# Patient Record
Sex: MALE | Race: BLACK OR AFRICAN AMERICAN | Employment: UNEMPLOYED | URBAN - METROPOLITAN AREA
[De-identification: names, ages, dates, MRNs, and addresses within clinical notes are randomized per-mention and may not be internally consistent; named-entity substitution may affect disease eponyms.]

---

## 2017-01-01 ENCOUNTER — OFFICE VISIT (OUTPATIENT)
Dept: NEUROLOGY | Age: 37
End: 2017-01-01

## 2017-01-01 ENCOUNTER — APPOINTMENT (OUTPATIENT)
Dept: CT IMAGING | Age: 37
DRG: 071 | End: 2017-01-01
Attending: NEUROLOGICAL SURGERY
Payer: MEDICARE

## 2017-01-01 ENCOUNTER — HOME CARE VISIT (OUTPATIENT)
Dept: HOME HEALTH SERVICES | Facility: HOME HEALTH | Age: 37
End: 2017-01-01

## 2017-01-01 ENCOUNTER — PATIENT OUTREACH (OUTPATIENT)
Dept: FAMILY MEDICINE CLINIC | Age: 37
End: 2017-01-01

## 2017-01-01 ENCOUNTER — APPOINTMENT (OUTPATIENT)
Dept: MRI IMAGING | Age: 37
DRG: 071 | End: 2017-01-01
Attending: NEUROLOGICAL SURGERY
Payer: MEDICARE

## 2017-01-01 ENCOUNTER — HOME CARE VISIT (OUTPATIENT)
Dept: SCHEDULING | Facility: HOME HEALTH | Age: 37
End: 2017-01-01
Payer: MEDICARE

## 2017-01-01 ENCOUNTER — APPOINTMENT (OUTPATIENT)
Dept: PHYSICAL THERAPY | Age: 37
End: 2017-01-01

## 2017-01-01 ENCOUNTER — APPOINTMENT (OUTPATIENT)
Dept: MRI IMAGING | Age: 37
DRG: 095 | End: 2017-01-01
Attending: EMERGENCY MEDICINE
Payer: MEDICARE

## 2017-01-01 ENCOUNTER — DOCUMENTATION ONLY (OUTPATIENT)
Dept: NEUROLOGY | Age: 37
End: 2017-01-01

## 2017-01-01 ENCOUNTER — HOSPITAL ENCOUNTER (INPATIENT)
Age: 37
LOS: 8 days | Discharge: NURSING, REHAB OR LONG-TERM CARE | DRG: 071 | End: 2017-06-23
Attending: EMERGENCY MEDICINE | Admitting: HOSPITALIST
Payer: MEDICARE

## 2017-01-01 ENCOUNTER — ANESTHESIA (OUTPATIENT)
Dept: SURGERY | Age: 37
DRG: 071 | End: 2017-01-01
Payer: MEDICARE

## 2017-01-01 ENCOUNTER — TELEPHONE (OUTPATIENT)
Dept: FAMILY MEDICINE CLINIC | Age: 37
End: 2017-01-01

## 2017-01-01 ENCOUNTER — TELEPHONE (OUTPATIENT)
Dept: NEUROLOGY | Age: 37
End: 2017-01-01

## 2017-01-01 ENCOUNTER — APPOINTMENT (OUTPATIENT)
Dept: GENERAL RADIOLOGY | Age: 37
DRG: 095 | End: 2017-01-01
Attending: EMERGENCY MEDICINE
Payer: MEDICARE

## 2017-01-01 ENCOUNTER — HOSPITAL ENCOUNTER (OUTPATIENT)
Dept: MRI IMAGING | Age: 37
Discharge: HOME | End: 2017-08-10
Attending: NEUROLOGICAL SURGERY
Payer: MEDICARE

## 2017-01-01 ENCOUNTER — HOSPITAL ENCOUNTER (OUTPATIENT)
Dept: CT IMAGING | Age: 37
Discharge: HOME | End: 2017-03-01
Attending: FAMILY MEDICINE
Payer: MEDICARE

## 2017-01-01 ENCOUNTER — APPOINTMENT (OUTPATIENT)
Dept: GENERAL RADIOLOGY | Age: 37
DRG: 071 | End: 2017-01-01
Attending: HOSPITALIST
Payer: MEDICARE

## 2017-01-01 ENCOUNTER — HOSPITAL ENCOUNTER (INPATIENT)
Age: 37
LOS: 7 days | Discharge: NURSING, REHAB OR LONG-TERM CARE | DRG: 095 | End: 2017-03-09
Attending: EMERGENCY MEDICINE | Admitting: FAMILY MEDICINE
Payer: MEDICARE

## 2017-01-01 ENCOUNTER — OFFICE VISIT (OUTPATIENT)
Dept: FAMILY MEDICINE CLINIC | Age: 37
End: 2017-01-01

## 2017-01-01 ENCOUNTER — HOME HEALTH ADMISSION (OUTPATIENT)
Dept: HOME HEALTH SERVICES | Facility: HOME HEALTH | Age: 37
End: 2017-01-01
Payer: MEDICARE

## 2017-01-01 ENCOUNTER — ANESTHESIA EVENT (OUTPATIENT)
Dept: SURGERY | Age: 37
DRG: 071 | End: 2017-01-01
Payer: MEDICARE

## 2017-01-01 ENCOUNTER — HOSPITAL ENCOUNTER (OUTPATIENT)
Dept: GENERAL RADIOLOGY | Age: 37
Discharge: HOME | End: 2017-03-02
Payer: MEDICARE

## 2017-01-01 ENCOUNTER — APPOINTMENT (OUTPATIENT)
Dept: MRI IMAGING | Age: 37
DRG: 095 | End: 2017-01-01
Attending: PSYCHIATRY & NEUROLOGY
Payer: MEDICARE

## 2017-01-01 ENCOUNTER — APPOINTMENT (OUTPATIENT)
Dept: GENERAL RADIOLOGY | Age: 37
DRG: 071 | End: 2017-01-01
Attending: EMERGENCY MEDICINE
Payer: MEDICARE

## 2017-01-01 ENCOUNTER — HOSPITAL ENCOUNTER (OUTPATIENT)
Dept: LAB | Age: 37
Discharge: HOME | DRG: 095 | End: 2017-03-02
Payer: MEDICARE

## 2017-01-01 ENCOUNTER — APPOINTMENT (OUTPATIENT)
Dept: CT IMAGING | Age: 37
DRG: 071 | End: 2017-01-01
Attending: EMERGENCY MEDICINE
Payer: MEDICARE

## 2017-01-01 ENCOUNTER — PATIENT OUTREACH (OUTPATIENT)
Dept: CASE MANAGEMENT | Age: 37
End: 2017-01-01

## 2017-01-01 ENCOUNTER — HOSPITAL ENCOUNTER (INPATIENT)
Age: 37
LOS: 10 days | Discharge: HOME HEALTH | End: 2017-03-19
Attending: INTERNAL MEDICINE | Admitting: INTERNAL MEDICINE

## 2017-01-01 ENCOUNTER — DOCUMENTATION ONLY (OUTPATIENT)
Dept: FAMILY MEDICINE CLINIC | Age: 37
End: 2017-01-01

## 2017-01-01 ENCOUNTER — APPOINTMENT (OUTPATIENT)
Dept: MRI IMAGING | Age: 37
DRG: 071 | End: 2017-01-01
Attending: EMERGENCY MEDICINE
Payer: MEDICARE

## 2017-01-01 VITALS
TEMPERATURE: 99.2 F | WEIGHT: 184.8 LBS | OXYGEN SATURATION: 98 % | BODY MASS INDEX: 23.72 KG/M2 | HEART RATE: 124 BPM | DIASTOLIC BLOOD PRESSURE: 62 MMHG | HEIGHT: 74 IN | SYSTOLIC BLOOD PRESSURE: 120 MMHG

## 2017-01-01 VITALS
DIASTOLIC BLOOD PRESSURE: 72 MMHG | OXYGEN SATURATION: 98 % | TEMPERATURE: 98.7 F | SYSTOLIC BLOOD PRESSURE: 130 MMHG | HEART RATE: 118 BPM

## 2017-01-01 VITALS
SYSTOLIC BLOOD PRESSURE: 103 MMHG | HEART RATE: 102 BPM | BODY MASS INDEX: 23.11 KG/M2 | WEIGHT: 174.4 LBS | TEMPERATURE: 98.5 F | DIASTOLIC BLOOD PRESSURE: 47 MMHG | OXYGEN SATURATION: 98 % | HEIGHT: 73 IN

## 2017-01-01 VITALS
OXYGEN SATURATION: 98 % | SYSTOLIC BLOOD PRESSURE: 108 MMHG | HEART RATE: 83 BPM | DIASTOLIC BLOOD PRESSURE: 66 MMHG | RESPIRATION RATE: 16 BRPM | HEIGHT: 74 IN | BODY MASS INDEX: 21.7 KG/M2 | WEIGHT: 169.09 LBS | TEMPERATURE: 98.1 F

## 2017-01-01 VITALS
BODY MASS INDEX: 22.77 KG/M2 | HEART RATE: 93 BPM | DIASTOLIC BLOOD PRESSURE: 63 MMHG | OXYGEN SATURATION: 97 % | SYSTOLIC BLOOD PRESSURE: 113 MMHG | HEIGHT: 74 IN | TEMPERATURE: 98 F | WEIGHT: 177.4 LBS | RESPIRATION RATE: 18 BRPM

## 2017-01-01 VITALS
DIASTOLIC BLOOD PRESSURE: 74 MMHG | HEART RATE: 105 BPM | OXYGEN SATURATION: 98 % | SYSTOLIC BLOOD PRESSURE: 124 MMHG | TEMPERATURE: 98.8 F

## 2017-01-01 VITALS — WEIGHT: 175 LBS | BODY MASS INDEX: 22.47 KG/M2

## 2017-01-01 VITALS
WEIGHT: 178.4 LBS | BODY MASS INDEX: 23.64 KG/M2 | DIASTOLIC BLOOD PRESSURE: 76 MMHG | TEMPERATURE: 98.6 F | OXYGEN SATURATION: 99 % | SYSTOLIC BLOOD PRESSURE: 110 MMHG | HEART RATE: 79 BPM | RESPIRATION RATE: 14 BRPM | HEIGHT: 73 IN

## 2017-01-01 VITALS
RESPIRATION RATE: 18 BRPM | TEMPERATURE: 98.6 F | HEIGHT: 74 IN | DIASTOLIC BLOOD PRESSURE: 79 MMHG | SYSTOLIC BLOOD PRESSURE: 132 MMHG | OXYGEN SATURATION: 98 % | HEART RATE: 110 BPM | BODY MASS INDEX: 23.18 KG/M2 | WEIGHT: 180.6 LBS

## 2017-01-01 VITALS
SYSTOLIC BLOOD PRESSURE: 120 MMHG | TEMPERATURE: 98.4 F | OXYGEN SATURATION: 99 % | DIASTOLIC BLOOD PRESSURE: 76 MMHG | HEART RATE: 103 BPM

## 2017-01-01 VITALS — DIASTOLIC BLOOD PRESSURE: 64 MMHG | OXYGEN SATURATION: 97 % | HEART RATE: 104 BPM | SYSTOLIC BLOOD PRESSURE: 112 MMHG

## 2017-01-01 VITALS
WEIGHT: 181.2 LBS | RESPIRATION RATE: 18 BRPM | BODY MASS INDEX: 23.25 KG/M2 | HEART RATE: 108 BPM | TEMPERATURE: 98.4 F | SYSTOLIC BLOOD PRESSURE: 108 MMHG | DIASTOLIC BLOOD PRESSURE: 67 MMHG | OXYGEN SATURATION: 94 % | HEIGHT: 74 IN

## 2017-01-01 VITALS — SYSTOLIC BLOOD PRESSURE: 110 MMHG | OXYGEN SATURATION: 96 % | HEART RATE: 94 BPM | DIASTOLIC BLOOD PRESSURE: 60 MMHG

## 2017-01-01 VITALS
TEMPERATURE: 99.3 F | DIASTOLIC BLOOD PRESSURE: 77 MMHG | HEART RATE: 116 BPM | OXYGEN SATURATION: 97 % | SYSTOLIC BLOOD PRESSURE: 113 MMHG | HEIGHT: 73 IN | BODY MASS INDEX: 23.19 KG/M2 | WEIGHT: 175 LBS | RESPIRATION RATE: 18 BRPM

## 2017-01-01 VITALS — DIASTOLIC BLOOD PRESSURE: 60 MMHG | SYSTOLIC BLOOD PRESSURE: 104 MMHG | HEART RATE: 100 BPM | OXYGEN SATURATION: 96 %

## 2017-01-01 VITALS
WEIGHT: 180 LBS | DIASTOLIC BLOOD PRESSURE: 68 MMHG | SYSTOLIC BLOOD PRESSURE: 120 MMHG | BODY MASS INDEX: 23.86 KG/M2 | OXYGEN SATURATION: 98 % | HEIGHT: 73 IN | HEART RATE: 62 BPM | TEMPERATURE: 98.5 F

## 2017-01-01 VITALS
OXYGEN SATURATION: 97 % | TEMPERATURE: 98 F | DIASTOLIC BLOOD PRESSURE: 82 MMHG | RESPIRATION RATE: 14 BRPM | HEIGHT: 74 IN | BODY MASS INDEX: 22.35 KG/M2 | WEIGHT: 174.16 LBS | HEART RATE: 78 BPM | SYSTOLIC BLOOD PRESSURE: 126 MMHG

## 2017-01-01 VITALS — OXYGEN SATURATION: 99 % | HEART RATE: 87 BPM | DIASTOLIC BLOOD PRESSURE: 60 MMHG | SYSTOLIC BLOOD PRESSURE: 108 MMHG

## 2017-01-01 VITALS
SYSTOLIC BLOOD PRESSURE: 134 MMHG | BODY MASS INDEX: 22.84 KG/M2 | WEIGHT: 178 LBS | HEIGHT: 74 IN | OXYGEN SATURATION: 98 % | HEART RATE: 112 BPM | DIASTOLIC BLOOD PRESSURE: 62 MMHG | RESPIRATION RATE: 14 BRPM | TEMPERATURE: 99.2 F

## 2017-01-01 VITALS
BODY MASS INDEX: 23.44 KG/M2 | RESPIRATION RATE: 14 BRPM | WEIGHT: 182.6 LBS | TEMPERATURE: 98.7 F | HEART RATE: 112 BPM | SYSTOLIC BLOOD PRESSURE: 102 MMHG | DIASTOLIC BLOOD PRESSURE: 68 MMHG | HEIGHT: 74 IN | OXYGEN SATURATION: 98 %

## 2017-01-01 VITALS
OXYGEN SATURATION: 100 % | HEIGHT: 73 IN | RESPIRATION RATE: 18 BRPM | DIASTOLIC BLOOD PRESSURE: 73 MMHG | HEART RATE: 107 BPM | TEMPERATURE: 100.4 F | BODY MASS INDEX: 23.19 KG/M2 | WEIGHT: 175 LBS | SYSTOLIC BLOOD PRESSURE: 116 MMHG

## 2017-01-01 VITALS
DIASTOLIC BLOOD PRESSURE: 78 MMHG | HEART RATE: 69 BPM | TEMPERATURE: 98.1 F | SYSTOLIC BLOOD PRESSURE: 118 MMHG | OXYGEN SATURATION: 93 %

## 2017-01-01 VITALS
RESPIRATION RATE: 14 BRPM | SYSTOLIC BLOOD PRESSURE: 108 MMHG | OXYGEN SATURATION: 96 % | BODY MASS INDEX: 22.97 KG/M2 | HEIGHT: 74 IN | WEIGHT: 179 LBS | HEART RATE: 103 BPM | TEMPERATURE: 98.6 F | DIASTOLIC BLOOD PRESSURE: 64 MMHG

## 2017-01-01 VITALS
TEMPERATURE: 97.9 F | DIASTOLIC BLOOD PRESSURE: 76 MMHG | OXYGEN SATURATION: 95 % | SYSTOLIC BLOOD PRESSURE: 122 MMHG | HEART RATE: 99 BPM

## 2017-01-01 VITALS — SYSTOLIC BLOOD PRESSURE: 100 MMHG | DIASTOLIC BLOOD PRESSURE: 66 MMHG

## 2017-01-01 VITALS — DIASTOLIC BLOOD PRESSURE: 56 MMHG | OXYGEN SATURATION: 98 % | HEART RATE: 99 BPM | SYSTOLIC BLOOD PRESSURE: 110 MMHG

## 2017-01-01 VITALS — OXYGEN SATURATION: 98 % | HEART RATE: 107 BPM | SYSTOLIC BLOOD PRESSURE: 128 MMHG | DIASTOLIC BLOOD PRESSURE: 59 MMHG

## 2017-01-01 DIAGNOSIS — D86.89 NEUROSARCOIDOSIS: ICD-10-CM

## 2017-01-01 DIAGNOSIS — H53.2 DIPLOPIA: ICD-10-CM

## 2017-01-01 DIAGNOSIS — D86.89 NEUROSARCOIDOSIS IN ADULT: ICD-10-CM

## 2017-01-01 DIAGNOSIS — D86.89 NEUROSARCOIDOSIS: Primary | ICD-10-CM

## 2017-01-01 DIAGNOSIS — M24.542 CONTRACTURE, LEFT HAND: ICD-10-CM

## 2017-01-01 DIAGNOSIS — R50.9 FEVER, UNSPECIFIED FEVER CAUSE: Primary | ICD-10-CM

## 2017-01-01 DIAGNOSIS — G81.94 LEFT HEMIPARESIS (HCC): Primary | ICD-10-CM

## 2017-01-01 DIAGNOSIS — G81.94 LEFT HEMIPARESIS (HCC): ICD-10-CM

## 2017-01-01 DIAGNOSIS — G81.10 SPASTIC HEMIPLEGIA (HCC): Primary | ICD-10-CM

## 2017-01-01 DIAGNOSIS — Z79.899 HIGH RISK MEDICATION USE: ICD-10-CM

## 2017-01-01 DIAGNOSIS — M25.572 ACUTE LEFT ANKLE PAIN: ICD-10-CM

## 2017-01-01 DIAGNOSIS — M62.838 MUSCLE SPASTICITY: ICD-10-CM

## 2017-01-01 DIAGNOSIS — G93.89 BRAIN MASS: ICD-10-CM

## 2017-01-01 DIAGNOSIS — H53.2 DIPLOPIA: Primary | ICD-10-CM

## 2017-01-01 DIAGNOSIS — K59.01 SLOW TRANSIT CONSTIPATION: ICD-10-CM

## 2017-01-01 DIAGNOSIS — R51.9 INTRACTABLE HEADACHE, UNSPECIFIED CHRONICITY PATTERN, UNSPECIFIED HEADACHE TYPE: ICD-10-CM

## 2017-01-01 DIAGNOSIS — H53.2 DOUBLE VISION: ICD-10-CM

## 2017-01-01 DIAGNOSIS — B37.0 THRUSH: Primary | ICD-10-CM

## 2017-01-01 DIAGNOSIS — G03.9 MENINGITIS: Primary | ICD-10-CM

## 2017-01-01 DIAGNOSIS — R32 ENURESIS: ICD-10-CM

## 2017-01-01 DIAGNOSIS — R05.9 COUGH: ICD-10-CM

## 2017-01-01 LAB
ACID FAST STN SPEC: NEGATIVE
ACID FAST STN SPEC: NORMAL
ALB CSF/SERPL: 6 {RATIO} (ref 0–8)
ALBUMIN CSF-MCNC: 20 MG/DL (ref 11–48)
ALBUMIN SERPL BCP-MCNC: 2.3 G/DL (ref 3.4–5)
ALBUMIN SERPL BCP-MCNC: 2.3 G/DL (ref 3.4–5)
ALBUMIN SERPL BCP-MCNC: 2.4 G/DL (ref 3.4–5)
ALBUMIN SERPL BCP-MCNC: 2.6 G/DL (ref 3.4–5)
ALBUMIN SERPL BCP-MCNC: 2.7 G/DL (ref 3.4–5)
ALBUMIN SERPL BCP-MCNC: 2.7 G/DL (ref 3.4–5)
ALBUMIN SERPL BCP-MCNC: 3 G/DL (ref 3.4–5)
ALBUMIN SERPL BCP-MCNC: 3.3 G/DL (ref 3.4–5)
ALBUMIN SERPL-MCNC: 3.3 G/DL (ref 3.5–5.5)
ALBUMIN/GLOB SERPL: 0.6 {RATIO} (ref 0.8–1.7)
ALBUMIN/GLOB SERPL: 0.6 {RATIO} (ref 0.8–1.7)
ALBUMIN/GLOB SERPL: 0.7 {RATIO} (ref 0.8–1.7)
ALBUMIN/GLOB SERPL: 0.7 {RATIO} (ref 0.8–1.7)
ALBUMIN/GLOB SERPL: 0.8 {RATIO} (ref 0.8–1.7)
ALP SERPL-CCNC: 64 U/L (ref 45–117)
ALP SERPL-CCNC: 66 U/L (ref 45–117)
ALP SERPL-CCNC: 66 U/L (ref 45–117)
ALP SERPL-CCNC: 76 U/L (ref 45–117)
ALP SERPL-CCNC: 78 U/L (ref 45–117)
ALP SERPL-CCNC: 85 U/L (ref 45–117)
ALP SERPL-CCNC: 88 U/L (ref 45–117)
ALP SERPL-CCNC: 92 U/L (ref 45–117)
ALT SERPL-CCNC: 13 U/L (ref 16–61)
ALT SERPL-CCNC: 15 U/L (ref 16–61)
ALT SERPL-CCNC: 18 U/L (ref 16–61)
ALT SERPL-CCNC: 20 U/L (ref 16–61)
ALT SERPL-CCNC: 22 U/L (ref 16–61)
ALT SERPL-CCNC: 23 U/L (ref 16–61)
ALT SERPL-CCNC: 28 U/L (ref 16–61)
ALT SERPL-CCNC: 31 U/L (ref 16–61)
ANA SER QL: NEGATIVE
ANGIO CONVERTING ENZ, CSF: 1 U/L (ref 0–2.5)
ANION GAP BLD CALC-SCNC: 10 MMOL/L (ref 3–18)
ANION GAP BLD CALC-SCNC: 11 MMOL/L (ref 3–18)
ANION GAP BLD CALC-SCNC: 5 MMOL/L (ref 3–18)
ANION GAP BLD CALC-SCNC: 7 MMOL/L (ref 3–18)
ANION GAP BLD CALC-SCNC: 9 MMOL/L (ref 3–18)
APPEARANCE UR: ABNORMAL
APPEARANCE UR: CLEAR
AST SERPL W P-5'-P-CCNC: 11 U/L (ref 15–37)
AST SERPL W P-5'-P-CCNC: 16 U/L (ref 15–37)
AST SERPL W P-5'-P-CCNC: 20 U/L (ref 15–37)
AST SERPL W P-5'-P-CCNC: 21 U/L (ref 15–37)
AST SERPL W P-5'-P-CCNC: 21 U/L (ref 15–37)
AST SERPL W P-5'-P-CCNC: 25 U/L (ref 15–37)
AST SERPL W P-5'-P-CCNC: 7 U/L (ref 15–37)
AST SERPL W P-5'-P-CCNC: 8 U/L (ref 15–37)
B BURGDOR IGG PATRN CSF IB-IMP: NEGATIVE
B BURGDOR IGM PATRN CSF IB-IMP: NEGATIVE
B BURGDOR18KD IGG CSF QL IB: ABNORMAL
B BURGDOR23KD IGG CSF QL IB: ABNORMAL
B BURGDOR23KD IGM CSF QL IB: ABNORMAL
B BURGDOR28KD IGG CSF QL IB: ABNORMAL
B BURGDOR30KD IGG CSF QL IB: ABNORMAL
B BURGDOR39KD IGG CSF QL IB: ABNORMAL
B BURGDOR39KD IGM CSF QL IB: ABNORMAL
B BURGDOR41KD IGG CSF QL IB: ABNORMAL
B BURGDOR41KD IGM CSF QL IB: ABNORMAL
B BURGDOR45KD IGG CSF QL IB: ABNORMAL
B BURGDOR58KD IGG CSF QL IB: PRESENT
B BURGDOR66KD IGG CSF QL IB: PRESENT
B BURGDOR93KD IGG CSF QL IB: ABNORMAL
BACTERIA SPEC CULT: NORMAL
BACTERIA URNS QL MICRO: NEGATIVE /HPF
BACTERIA URNS QL MICRO: NEGATIVE /HPF
BASOPHILS # BLD AUTO: 0 K/UL (ref 0–0.06)
BASOPHILS # BLD AUTO: 0 X10E3/UL (ref 0–0.2)
BASOPHILS # BLD: 0 % (ref 0–2)
BASOPHILS NFR BLD AUTO: 0 %
BDY SITE: NORMAL
BILIRUB SERPL-MCNC: 0.1 MG/DL (ref 0.2–1)
BILIRUB SERPL-MCNC: 0.1 MG/DL (ref 0.2–1)
BILIRUB SERPL-MCNC: 0.2 MG/DL (ref 0.2–1)
BILIRUB SERPL-MCNC: 0.6 MG/DL (ref 0.2–1)
BILIRUB SERPL-MCNC: 0.6 MG/DL (ref 0.2–1)
BILIRUB SERPL-MCNC: 0.9 MG/DL (ref 0.2–1)
BILIRUB UR QL STRIP: NORMAL
BILIRUB UR QL: NEGATIVE
BILIRUB UR QL: NEGATIVE
BUN SERPL-MCNC: 10 MG/DL (ref 7–18)
BUN SERPL-MCNC: 10 MG/DL (ref 7–18)
BUN SERPL-MCNC: 11 MG/DL (ref 7–18)
BUN SERPL-MCNC: 12 MG/DL (ref 7–18)
BUN SERPL-MCNC: 16 MG/DL (ref 7–18)
BUN SERPL-MCNC: 17 MG/DL (ref 7–18)
BUN SERPL-MCNC: 8 MG/DL (ref 7–18)
BUN SERPL-MCNC: 9 MG/DL (ref 7–18)
BUN/CREAT SERPL: 10 (ref 12–20)
BUN/CREAT SERPL: 11 (ref 12–20)
BUN/CREAT SERPL: 14 (ref 12–20)
BUN/CREAT SERPL: 14 (ref 12–20)
BUN/CREAT SERPL: 18 (ref 12–20)
BUN/CREAT SERPL: 19 (ref 12–20)
BUN/CREAT SERPL: 24 (ref 12–20)
BUN/CREAT SERPL: 27 (ref 12–20)
BUN/CREAT SERPL: 9 (ref 12–20)
CALCIUM SERPL-MCNC: 7.2 MG/DL (ref 8.5–10.1)
CALCIUM SERPL-MCNC: 7.5 MG/DL (ref 8.5–10.1)
CALCIUM SERPL-MCNC: 7.6 MG/DL (ref 8.5–10.1)
CALCIUM SERPL-MCNC: 7.7 MG/DL (ref 8.5–10.1)
CALCIUM SERPL-MCNC: 7.9 MG/DL (ref 8.5–10.1)
CALCIUM SERPL-MCNC: 7.9 MG/DL (ref 8.5–10.1)
CALCIUM SERPL-MCNC: 8.1 MG/DL (ref 8.5–10.1)
CALCIUM SERPL-MCNC: 8.5 MG/DL (ref 8.5–10.1)
CALCIUM SERPL-MCNC: 8.6 MG/DL (ref 8.5–10.1)
CHARACTER SMN: ABNORMAL
CHARACTER SMN: CLEAR
CHLORIDE SERPL-SCNC: 103 MMOL/L (ref 100–108)
CHLORIDE SERPL-SCNC: 106 MMOL/L (ref 100–108)
CHLORIDE SERPL-SCNC: 107 MMOL/L (ref 100–108)
CHLORIDE SERPL-SCNC: 107 MMOL/L (ref 100–108)
CHLORIDE SERPL-SCNC: 109 MMOL/L (ref 100–108)
CHLORIDE SERPL-SCNC: 110 MMOL/L (ref 100–108)
CHLORIDE SERPL-SCNC: 110 MMOL/L (ref 100–108)
CHLORIDE SERPL-SCNC: 115 MMOL/L (ref 100–108)
CHLORIDE SERPL-SCNC: 98 MMOL/L (ref 100–108)
CK MB CFR SERPL CALC: 0.1 % (ref 0–4)
CK MB SERPL-MCNC: 1.3 NG/ML (ref 5–25)
CK SERPL-CCNC: 896 U/L (ref 39–308)
CO2 SERPL-SCNC: 22 MMOL/L (ref 21–32)
CO2 SERPL-SCNC: 23 MMOL/L (ref 21–32)
CO2 SERPL-SCNC: 24 MMOL/L (ref 21–32)
CO2 SERPL-SCNC: 26 MMOL/L (ref 21–32)
CO2 SERPL-SCNC: 27 MMOL/L (ref 21–32)
CO2 SERPL-SCNC: 29 MMOL/L (ref 21–32)
COLOR SPUN CSF: COLORLESS
COLOR SPUN CSF: COLORLESS
COLOR UR: YELLOW
COLOR UR: YELLOW
CREAT SERPL-MCNC: 0.59 MG/DL (ref 0.6–1.3)
CREAT SERPL-MCNC: 0.64 MG/DL (ref 0.6–1.3)
CREAT SERPL-MCNC: 0.65 MG/DL (ref 0.6–1.3)
CREAT SERPL-MCNC: 0.65 MG/DL (ref 0.6–1.3)
CREAT SERPL-MCNC: 0.67 MG/DL (ref 0.6–1.3)
CREAT SERPL-MCNC: 0.7 MG/DL (ref 0.6–1.3)
CREAT SERPL-MCNC: 0.72 MG/DL (ref 0.6–1.3)
CREAT SERPL-MCNC: 0.88 MG/DL (ref 0.6–1.3)
CREAT SERPL-MCNC: 1 MG/DL (ref 0.6–1.3)
CRYPTOC AG CSF QL LA: NEGATIVE
DATE LAST DOSE: NORMAL
DIFFERENTIAL METHOD BLD: ABNORMAL
EOSINOPHIL # BLD AUTO: 0 X10E3/UL (ref 0–0.4)
EOSINOPHIL # BLD: 0 K/UL (ref 0–0.4)
EOSINOPHIL # BLD: 0.1 K/UL (ref 0–0.4)
EOSINOPHIL NFR BLD AUTO: 0 %
EOSINOPHIL NFR BLD: 0 % (ref 0–5)
EOSINOPHIL NFR BLD: 1 % (ref 0–5)
EPITH CASTS URNS QL MICRO: ABNORMAL /LPF (ref 0–5)
EPITH CASTS URNS QL MICRO: NEGATIVE /LPF (ref 0–5)
ERYTHROCYTE [DISTWIDTH] IN BLOOD BY AUTOMATED COUNT: 15.5 % (ref 11.6–14.5)
ERYTHROCYTE [DISTWIDTH] IN BLOOD BY AUTOMATED COUNT: 15.5 % (ref 11.6–14.5)
ERYTHROCYTE [DISTWIDTH] IN BLOOD BY AUTOMATED COUNT: 15.6 % (ref 11.6–14.5)
ERYTHROCYTE [DISTWIDTH] IN BLOOD BY AUTOMATED COUNT: 15.6 % (ref 11.6–14.5)
ERYTHROCYTE [DISTWIDTH] IN BLOOD BY AUTOMATED COUNT: 15.8 % (ref 11.6–14.5)
ERYTHROCYTE [DISTWIDTH] IN BLOOD BY AUTOMATED COUNT: 17 % (ref 11.6–14.5)
ERYTHROCYTE [DISTWIDTH] IN BLOOD BY AUTOMATED COUNT: 17 % (ref 11.6–14.5)
ERYTHROCYTE [DISTWIDTH] IN BLOOD BY AUTOMATED COUNT: 17.1 % (ref 11.6–14.5)
ERYTHROCYTE [DISTWIDTH] IN BLOOD BY AUTOMATED COUNT: 17.2 % (ref 11.6–14.5)
ERYTHROCYTE [DISTWIDTH] IN BLOOD BY AUTOMATED COUNT: 17.5 % (ref 11.6–14.5)
ERYTHROCYTE [DISTWIDTH] IN BLOOD BY AUTOMATED COUNT: 17.5 % (ref 12.3–15.4)
ERYTHROCYTE [SEDIMENTATION RATE] IN BLOOD: 80 MM/HR (ref 0–15)
EST. AVERAGE GLUCOSE BLD GHB EST-MCNC: 97 MG/DL
FLUAV AG NPH QL IA: NEGATIVE
FLUBV AG NOSE QL IA: NEGATIVE
FOLATE SERPL-MCNC: 16.1 NG/ML (ref 3.1–17.5)
FOLATE SERPL-MCNC: 17.8 NG/ML (ref 3.1–17.5)
FUNGUS SMEAR,FNGSMR: NORMAL
GLOBULIN SER CALC-MCNC: 2.9 G/DL (ref 2–4)
GLOBULIN SER CALC-MCNC: 3 G/DL (ref 2–4)
GLOBULIN SER CALC-MCNC: 3.2 G/DL (ref 2–4)
GLOBULIN SER CALC-MCNC: 3.6 G/DL (ref 2–4)
GLOBULIN SER CALC-MCNC: 3.7 G/DL (ref 2–4)
GLOBULIN SER CALC-MCNC: 4 G/DL (ref 2–4)
GLOBULIN SER CALC-MCNC: 4.2 G/DL (ref 2–4)
GLOBULIN SER CALC-MCNC: 4.8 G/DL (ref 2–4)
GLUCOSE BLD STRIP.AUTO-MCNC: 97 MG/DL (ref 70–110)
GLUCOSE BLD STRIP.AUTO-MCNC: 98 MG/DL (ref 70–110)
GLUCOSE CSF-MCNC: 46 MG/DL (ref 40–70)
GLUCOSE CSF-MCNC: 53 MG/DL (ref 40–70)
GLUCOSE SERPL-MCNC: 101 MG/DL (ref 74–99)
GLUCOSE SERPL-MCNC: 105 MG/DL (ref 74–99)
GLUCOSE SERPL-MCNC: 109 MG/DL (ref 74–99)
GLUCOSE SERPL-MCNC: 115 MG/DL (ref 74–99)
GLUCOSE SERPL-MCNC: 78 MG/DL (ref 74–99)
GLUCOSE SERPL-MCNC: 87 MG/DL (ref 74–99)
GLUCOSE SERPL-MCNC: 87 MG/DL (ref 74–99)
GLUCOSE SERPL-MCNC: 90 MG/DL (ref 74–99)
GLUCOSE SERPL-MCNC: 91 MG/DL (ref 74–99)
GLUCOSE SERPL-MCNC: 98 MG/DL (ref 74–99)
GLUCOSE SERPL-MCNC: 99 MG/DL (ref 74–99)
GLUCOSE UR STRIP.AUTO-MCNC: NEGATIVE MG/DL
GLUCOSE UR STRIP.AUTO-MCNC: NEGATIVE MG/DL
GLUCOSE UR-MCNC: NEGATIVE MG/DL
GP B STREP AG FLD QL: NEGATIVE
GRAM STN SPEC: NORMAL
HAEM INFLU B AG FLD QL: NEGATIVE
HBA1C MFR BLD: 5 % (ref 4.2–5.6)
HCT VFR BLD AUTO: 31.3 % (ref 36–48)
HCT VFR BLD AUTO: 31.9 % (ref 36–48)
HCT VFR BLD AUTO: 32.3 % (ref 36–48)
HCT VFR BLD AUTO: 33 % (ref 36–48)
HCT VFR BLD AUTO: 33.2 % (ref 36–48)
HCT VFR BLD AUTO: 33.7 % (ref 36–48)
HCT VFR BLD AUTO: 33.7 % (ref 37.5–51)
HCT VFR BLD AUTO: 34 % (ref 36–48)
HCT VFR BLD AUTO: 34.6 % (ref 36–48)
HCT VFR BLD AUTO: 34.6 % (ref 36–48)
HCT VFR BLD AUTO: 36 % (ref 36–48)
HGB BLD-MCNC: 10.2 G/DL (ref 13–16)
HGB BLD-MCNC: 10.2 G/DL (ref 13–16)
HGB BLD-MCNC: 10.5 G/DL (ref 13–16)
HGB BLD-MCNC: 10.5 G/DL (ref 13–16)
HGB BLD-MCNC: 10.6 G/DL (ref 12.6–17.7)
HGB BLD-MCNC: 10.7 G/DL (ref 13–16)
HGB BLD-MCNC: 10.9 G/DL (ref 13–16)
HGB BLD-MCNC: 10.9 G/DL (ref 13–16)
HGB BLD-MCNC: 11.1 G/DL (ref 13–16)
HGB BLD-MCNC: 11.1 G/DL (ref 13–16)
HGB BLD-MCNC: 11.5 G/DL (ref 13–16)
HGB UR QL STRIP: ABNORMAL
HGB UR QL STRIP: ABNORMAL
HIV 1 & 2 AB SER-IMP: NONREACTIVE
HIV12 RESULT COMMENT, HHIVC: NORMAL
HSV1 DNA SPEC QL NAA+PROBE: NEGATIVE
HSV2 DNA SPEC QL NAA+PROBE: NEGATIVE
IGG CSF-MCNC: 3.7 MG/DL (ref 0–8.6)
IGG SERPL-MCNC: 1020 MG/DL (ref 700–1600)
IGG SYNTH RATE SER+CSF CALC-MRATE: 0.9 MG/DAY
IGG/ALB CLEAR SER+CSF-RTO: 0.6 (ref 0–0.7)
IGG/ALB CSF: 0.19 {RATIO} (ref 0–0.25)
IMM GRANULOCYTES # BLD: 0 X10E3/UL (ref 0–0.1)
IMM GRANULOCYTES NFR BLD: 0 %
INR PPP: 1.1 (ref 0.8–1.2)
INR PPP: 1.2 (ref 0.8–1.2)
INR PPP: 1.4 (ref 0.8–1.2)
INR PPP: 1.5 (ref 0.8–1.2)
INTERPRETATION:, 510752: NORMAL
KETONES P FAST UR STRIP-MCNC: NORMAL MG/DL
KETONES UR QL STRIP.AUTO: NEGATIVE MG/DL
KETONES UR QL STRIP.AUTO: NEGATIVE MG/DL
LACTATE BLD-SCNC: 0.5 MMOL/L (ref 0.4–2)
LEUKOCYTE ESTERASE UR QL STRIP.AUTO: NEGATIVE
LEUKOCYTE ESTERASE UR QL STRIP.AUTO: NEGATIVE
LYMPHOCYTES # BLD AUTO: 1.5 X10E3/UL (ref 0.7–3.1)
LYMPHOCYTES # BLD AUTO: 10 % (ref 21–52)
LYMPHOCYTES # BLD AUTO: 10 % (ref 21–52)
LYMPHOCYTES # BLD AUTO: 11 % (ref 21–52)
LYMPHOCYTES # BLD AUTO: 17 % (ref 21–52)
LYMPHOCYTES # BLD AUTO: 5 % (ref 21–52)
LYMPHOCYTES # BLD AUTO: 7 % (ref 21–52)
LYMPHOCYTES # BLD AUTO: 8 % (ref 21–52)
LYMPHOCYTES # BLD AUTO: 8 % (ref 21–52)
LYMPHOCYTES # BLD AUTO: 9 % (ref 21–52)
LYMPHOCYTES # BLD: 0.5 K/UL (ref 0.9–3.6)
LYMPHOCYTES # BLD: 0.8 K/UL (ref 0.9–3.6)
LYMPHOCYTES # BLD: 0.9 K/UL (ref 0.9–3.6)
LYMPHOCYTES # BLD: 1 K/UL (ref 0.9–3.6)
LYMPHOCYTES # BLD: 1.1 K/UL (ref 0.9–3.6)
LYMPHOCYTES # BLD: 1.2 K/UL (ref 0.9–3.6)
LYMPHOCYTES # BLD: 2.1 K/UL (ref 0.9–3.6)
LYMPHOCYTES NFR BLD AUTO: 11 %
LYMPHOCYTES NFR CSF MANUAL: 5 %
MAGNESIUM SERPL-MCNC: 2 MG/DL (ref 1.6–2.6)
MBP CSF-MCNC: 6.4 NG/ML (ref 0–1.2)
MCH RBC QN AUTO: 26.2 PG (ref 26.6–33)
MCH RBC QN AUTO: 26.3 PG (ref 24–34)
MCH RBC QN AUTO: 26.4 PG (ref 24–34)
MCH RBC QN AUTO: 26.6 PG (ref 24–34)
MCH RBC QN AUTO: 26.7 PG (ref 24–34)
MCH RBC QN AUTO: 26.8 PG (ref 24–34)
MCH RBC QN AUTO: 27.1 PG (ref 24–34)
MCH RBC QN AUTO: 27.3 PG (ref 24–34)
MCH RBC QN AUTO: 27.4 PG (ref 24–34)
MCH RBC QN AUTO: 27.6 PG (ref 24–34)
MCH RBC QN AUTO: 27.7 PG (ref 24–34)
MCHC RBC AUTO-ENTMCNC: 31.5 G/DL (ref 31.5–35.7)
MCHC RBC AUTO-ENTMCNC: 31.8 G/DL (ref 31–37)
MCHC RBC AUTO-ENTMCNC: 31.9 G/DL (ref 31–37)
MCHC RBC AUTO-ENTMCNC: 32 G/DL (ref 31–37)
MCHC RBC AUTO-ENTMCNC: 32.1 G/DL (ref 31–37)
MCHC RBC AUTO-ENTMCNC: 32.2 G/DL (ref 31–37)
MCHC RBC AUTO-ENTMCNC: 32.3 G/DL (ref 31–37)
MCHC RBC AUTO-ENTMCNC: 32.5 G/DL (ref 31–37)
MCHC RBC AUTO-ENTMCNC: 32.6 G/DL (ref 31–37)
MCV RBC AUTO: 82.1 FL (ref 74–97)
MCV RBC AUTO: 82.4 FL (ref 74–97)
MCV RBC AUTO: 82.6 FL (ref 74–97)
MCV RBC AUTO: 82.6 FL (ref 74–97)
MCV RBC AUTO: 83 FL (ref 79–97)
MCV RBC AUTO: 83.6 FL (ref 74–97)
MCV RBC AUTO: 84.8 FL (ref 74–97)
MCV RBC AUTO: 85 FL (ref 74–97)
MCV RBC AUTO: 85.1 FL (ref 74–97)
MCV RBC AUTO: 85.3 FL (ref 74–97)
MCV RBC AUTO: 85.8 FL (ref 74–97)
MONOCYTES # BLD AUTO: 1.3 X10E3/UL (ref 0.1–0.9)
MONOCYTES # BLD: 0.1 K/UL (ref 0.05–1.2)
MONOCYTES # BLD: 0.4 K/UL (ref 0.05–1.2)
MONOCYTES # BLD: 0.4 K/UL (ref 0.05–1.2)
MONOCYTES # BLD: 0.5 K/UL (ref 0.05–1.2)
MONOCYTES # BLD: 0.6 K/UL (ref 0.05–1.2)
MONOCYTES # BLD: 0.7 K/UL (ref 0.05–1.2)
MONOCYTES # BLD: 0.8 K/UL (ref 0.05–1.2)
MONOCYTES # BLD: 1 K/UL (ref 0.05–1.2)
MONOCYTES # BLD: 1.1 K/UL (ref 0.05–1.2)
MONOCYTES NFR BLD AUTO: 1 % (ref 3–10)
MONOCYTES NFR BLD AUTO: 3 % (ref 3–10)
MONOCYTES NFR BLD AUTO: 4 % (ref 3–10)
MONOCYTES NFR BLD AUTO: 5 % (ref 3–10)
MONOCYTES NFR BLD AUTO: 6 % (ref 3–10)
MONOCYTES NFR BLD AUTO: 7 % (ref 3–10)
MONOCYTES NFR BLD AUTO: 7 % (ref 3–10)
MONOCYTES NFR BLD AUTO: 8 % (ref 3–10)
MONOCYTES NFR BLD AUTO: 9 %
MONOCYTES NFR BLD AUTO: 9 % (ref 3–10)
MONOCYTES NFR CSF MANUAL: 12 %
MUCOUS THREADS URNS QL MICRO: ABNORMAL /LPF
MYCOBACTERIUM SPEC QL CULT: NEGATIVE
MYCOBACTERIUM SPEC QL CULT: NORMAL
N MEN AG SPEC QL: NEGATIVE
N MEN SG A+W135 AG FLD QL: NEGATIVE
N MEN SG B+E COLI K1 AG SPEC QL LA: NEGATIVE
NEUTROPHILS # BLD AUTO: 11.5 X10E3/UL (ref 1.4–7)
NEUTROPHILS NFR BLD AUTO: 80 %
NEUTS SEG # BLD: 10.3 K/UL (ref 1.8–8)
NEUTS SEG # BLD: 11.3 K/UL (ref 1.8–8)
NEUTS SEG # BLD: 8.3 K/UL (ref 1.8–8)
NEUTS SEG # BLD: 8.5 K/UL (ref 1.8–8)
NEUTS SEG # BLD: 8.7 K/UL (ref 1.8–8)
NEUTS SEG # BLD: 8.9 K/UL (ref 1.8–8)
NEUTS SEG # BLD: 9.2 K/UL (ref 1.8–8)
NEUTS SEG # BLD: 9.4 K/UL (ref 1.8–8)
NEUTS SEG # BLD: 9.8 K/UL (ref 1.8–8)
NEUTS SEG NFR BLD AUTO: 73 % (ref 40–73)
NEUTS SEG NFR BLD AUTO: 82 % (ref 40–73)
NEUTS SEG NFR BLD AUTO: 84 % (ref 40–73)
NEUTS SEG NFR BLD AUTO: 84 % (ref 40–73)
NEUTS SEG NFR BLD AUTO: 85 % (ref 40–73)
NEUTS SEG NFR BLD AUTO: 85 % (ref 40–73)
NEUTS SEG NFR BLD AUTO: 87 % (ref 40–73)
NEUTS SEG NFR BLD AUTO: 90 % (ref 40–73)
NEUTS SEG NFR BLD AUTO: 94 % (ref 40–73)
NEUTS SEG NFR CSF MANUAL: 83 % (ref 0–6)
NITRITE UR QL STRIP.AUTO: NEGATIVE
NITRITE UR QL STRIP.AUTO: NEGATIVE
OLIGOCLONAL BANDS.IT SER+CSF QL: ABNORMAL
OLIGOCLONAL BANDS.IT SER+CSF QL: NORMAL
OLIGOCLONAL BANDS.IT SER+CSF QL: NORMAL
PH UR STRIP: 5.5 [PH] (ref 4.6–8)
PH UR STRIP: 6.5 [PH] (ref 5–8)
PH UR STRIP: 6.5 [PH] (ref 5–8)
PLATELET # BLD AUTO: 239 K/UL (ref 135–420)
PLATELET # BLD AUTO: 249 K/UL (ref 135–420)
PLATELET # BLD AUTO: 251 K/UL (ref 135–420)
PLATELET # BLD AUTO: 254 K/UL (ref 135–420)
PLATELET # BLD AUTO: 261 K/UL (ref 135–420)
PLATELET # BLD AUTO: 277 X10E3/UL (ref 150–379)
PLATELET # BLD AUTO: 296 K/UL (ref 135–420)
PLATELET # BLD AUTO: 329 K/UL (ref 135–420)
PLATELET # BLD AUTO: 340 K/UL (ref 135–420)
PLATELET # BLD AUTO: 343 K/UL (ref 135–420)
PLATELET # BLD AUTO: 372 K/UL (ref 135–420)
PMV BLD AUTO: 10.1 FL (ref 9.2–11.8)
PMV BLD AUTO: 10.1 FL (ref 9.2–11.8)
PMV BLD AUTO: 10.3 FL (ref 9.2–11.8)
PMV BLD AUTO: 10.3 FL (ref 9.2–11.8)
PMV BLD AUTO: 10.7 FL (ref 9.2–11.8)
PMV BLD AUTO: 10.7 FL (ref 9.2–11.8)
PMV BLD AUTO: 11 FL (ref 9.2–11.8)
PMV BLD AUTO: 8.9 FL (ref 9.2–11.8)
PMV BLD AUTO: 9.7 FL (ref 9.2–11.8)
PMV BLD AUTO: 9.7 FL (ref 9.2–11.8)
POTASSIUM SERPL-SCNC: 3.5 MMOL/L (ref 3.5–5.5)
POTASSIUM SERPL-SCNC: 3.7 MMOL/L (ref 3.5–5.5)
POTASSIUM SERPL-SCNC: 3.8 MMOL/L (ref 3.5–5.5)
POTASSIUM SERPL-SCNC: 4.1 MMOL/L (ref 3.5–5.5)
POTASSIUM SERPL-SCNC: 4.2 MMOL/L (ref 3.5–5.5)
POTASSIUM SERPL-SCNC: 4.3 MMOL/L (ref 3.5–5.5)
POTASSIUM SERPL-SCNC: 4.3 MMOL/L (ref 3.5–5.5)
POTASSIUM SERPL-SCNC: 4.5 MMOL/L (ref 3.5–5.5)
POTASSIUM SERPL-SCNC: 4.5 MMOL/L (ref 3.5–5.5)
PROT CSF-MCNC: 103 MG/DL (ref 15–45)
PROT CSF-MCNC: 46 MG/DL (ref 15–45)
PROT SERPL-MCNC: 5.3 G/DL (ref 6.4–8.2)
PROT SERPL-MCNC: 5.3 G/DL (ref 6.4–8.2)
PROT SERPL-MCNC: 5.5 G/DL (ref 6.4–8.2)
PROT SERPL-MCNC: 6.3 G/DL (ref 6.4–8.2)
PROT SERPL-MCNC: 6.3 G/DL (ref 6.4–8.2)
PROT SERPL-MCNC: 6.9 G/DL (ref 6.4–8.2)
PROT SERPL-MCNC: 7.3 G/DL (ref 6.4–8.2)
PROT SERPL-MCNC: 7.8 G/DL (ref 6.4–8.2)
PROT UR QL STRIP: NORMAL MG/DL
PROT UR STRIP-MCNC: NEGATIVE MG/DL
PROT UR STRIP-MCNC: NEGATIVE MG/DL
PROTHROMBIN TIME: 13.7 SEC (ref 11.5–15.2)
PROTHROMBIN TIME: 14.3 SEC (ref 11.5–15.2)
PROTHROMBIN TIME: 14.8 SEC (ref 11.5–15.2)
PROTHROMBIN TIME: 14.8 SEC (ref 11.5–15.2)
PROTHROMBIN TIME: 15 SEC (ref 11.5–15.2)
PROTHROMBIN TIME: 15.2 SEC (ref 11.5–15.2)
PROTHROMBIN TIME: 15.2 SEC (ref 11.5–15.2)
PROTHROMBIN TIME: 16.7 SEC (ref 11.5–15.2)
PROTHROMBIN TIME: 17.1 SEC (ref 11.5–15.2)
QUICKVUE INFLUENZA TEST: NEGATIVE
RBC # BLD AUTO: 3.68 M/UL (ref 4.7–5.5)
RBC # BLD AUTO: 3.72 M/UL (ref 4.7–5.5)
RBC # BLD AUTO: 3.81 M/UL (ref 4.7–5.5)
RBC # BLD AUTO: 3.87 M/UL (ref 4.7–5.5)
RBC # BLD AUTO: 4.02 M/UL (ref 4.7–5.5)
RBC # BLD AUTO: 4.05 X10E6/UL (ref 4.14–5.8)
RBC # BLD AUTO: 4.07 M/UL (ref 4.7–5.5)
RBC # BLD AUTO: 4.09 M/UL (ref 4.7–5.5)
RBC # BLD AUTO: 4.14 M/UL (ref 4.7–5.5)
RBC # BLD AUTO: 4.14 M/UL (ref 4.7–5.5)
RBC # BLD AUTO: 4.36 M/UL (ref 4.7–5.5)
RBC # CSF: 2 /CU MM
RBC # CSF: 8 /CU MM
RBC #/AREA URNS HPF: ABNORMAL /HPF (ref 0–5)
RBC #/AREA URNS HPF: NORMAL /HPF (ref 0–5)
REAGIN AB CSF QL: NON REACTIVE
REAGIN AB CSF QL: NON REACTIVE
REF LAB TEST METHOD: NORMAL
REPORTED DOSE,DOSE: NORMAL UNITS
REPORTED DOSE/TIME,TMG: NORMAL
RPR SER QL: NONREACTIVE
RPR SER QL: NONREACTIVE
S PNEUM AG SPEC QL: NEGATIVE
SEE BELOW:, 164879: NORMAL
SERVICE CMNT-IMP: NORMAL
SODIUM SERPL-SCNC: 136 MMOL/L (ref 136–145)
SODIUM SERPL-SCNC: 137 MMOL/L (ref 136–145)
SODIUM SERPL-SCNC: 138 MMOL/L (ref 136–145)
SODIUM SERPL-SCNC: 140 MMOL/L (ref 136–145)
SODIUM SERPL-SCNC: 141 MMOL/L (ref 136–145)
SODIUM SERPL-SCNC: 141 MMOL/L (ref 136–145)
SODIUM SERPL-SCNC: 142 MMOL/L (ref 136–145)
SODIUM SERPL-SCNC: 143 MMOL/L (ref 136–145)
SODIUM SERPL-SCNC: 143 MMOL/L (ref 136–145)
SODIUM SERPL-SCNC: 145 MMOL/L (ref 136–145)
SODIUM SERPL-SCNC: 148 MMOL/L (ref 136–145)
SP GR UR REFRACTOMETRY: 1.01 (ref 1–1.03)
SP GR UR REFRACTOMETRY: <1.005 (ref 1–1.03)
SP GR UR STRIP: 1.03 (ref 1–1.03)
SPECIMEN PREPARATION: NORMAL
SPECIMEN PREPARATION: NORMAL
SPECIMEN SOURCE: NORMAL
T4 FREE SERPL-MCNC: 0.8 NG/DL (ref 0.7–1.5)
TPMT RBC-CCNC: 22.3 UNITS/ML RBC
TROPONIN I SERPL-MCNC: <0.02 NG/ML (ref 0–0.04)
TSH SERPL DL<=0.05 MIU/L-ACNC: 0.28 UIU/ML (ref 0.36–3.74)
TSH SERPL DL<=0.05 MIU/L-ACNC: 0.33 UIU/ML (ref 0.36–3.74)
TUBE # CSF: 1
TUBE # CSF: 3
TUBE # CSF: 3
UA UROBILINOGEN AMB POC: NORMAL (ref 0.2–1)
URINALYSIS CLARITY POC: CLEAR
URINALYSIS COLOR POC: YELLOW
URINE BLOOD POC: NORMAL
URINE LEUKOCYTES POC: NEGATIVE
URINE NITRITES POC: NEGATIVE
UROBILINOGEN UR QL STRIP.AUTO: 0.2 EU/DL (ref 0.2–1)
UROBILINOGEN UR QL STRIP.AUTO: 0.2 EU/DL (ref 0.2–1)
VALID INTERNAL CONTROL?: YES
VANCOMYCIN TROUGH SERPL-MCNC: 17.7 UG/ML (ref 10–20)
VIRUS SPEC CULT: NORMAL
VIT B12 SERPL-MCNC: 229 PG/ML (ref 211–911)
VIT B12 SERPL-MCNC: 232 PG/ML (ref 211–911)
WBC # BLD AUTO: 10 K/UL (ref 4.6–13.2)
WBC # BLD AUTO: 10.3 K/UL (ref 4.6–13.2)
WBC # BLD AUTO: 10.8 K/UL (ref 4.6–13.2)
WBC # BLD AUTO: 11.5 K/UL (ref 4.6–13.2)
WBC # BLD AUTO: 12.4 K/UL (ref 4.6–13.2)
WBC # BLD AUTO: 12.5 K/UL (ref 4.6–13.2)
WBC # BLD AUTO: 12.6 K/UL (ref 4.6–13.2)
WBC # BLD AUTO: 12.9 K/UL (ref 4.6–13.2)
WBC # BLD AUTO: 14.4 X10E3/UL (ref 3.4–10.8)
WBC # BLD AUTO: 9.6 K/UL (ref 4.6–13.2)
WBC # BLD AUTO: 9.9 K/UL (ref 4.6–13.2)
WBC # CSF: 0 /CU MM
WBC # CSF: 678 /CU MM
WBC URNS QL MICRO: ABNORMAL /HPF (ref 0–4)
WBC URNS QL MICRO: NEGATIVE /HPF (ref 0–4)

## 2017-01-01 PROCEDURE — 3331090001 HH PPS REVENUE CREDIT

## 2017-01-01 PROCEDURE — 86403 PARTICLE AGGLUT ANTBDY SCRN: CPT | Performed by: EMERGENCY MEDICINE

## 2017-01-01 PROCEDURE — 85610 PROTHROMBIN TIME: CPT | Performed by: HOSPITALIST

## 2017-01-01 PROCEDURE — 85025 COMPLETE CBC W/AUTO DIFF WBC: CPT | Performed by: PSYCHIATRY & NEUROLOGY

## 2017-01-01 PROCEDURE — 77030020263 HC SOL INJ SOD CL0.9% LFCR 1000ML

## 2017-01-01 PROCEDURE — 51798 US URINE CAPACITY MEASURE: CPT

## 2017-01-01 PROCEDURE — 36415 COLL VENOUS BLD VENIPUNCTURE: CPT | Performed by: HOSPITALIST

## 2017-01-01 PROCEDURE — 74011250636 HC RX REV CODE- 250/636: Performed by: NEUROLOGICAL SURGERY

## 2017-01-01 PROCEDURE — 70553 MRI BRAIN STEM W/O & W/DYE: CPT

## 2017-01-01 PROCEDURE — 96361 HYDRATE IV INFUSION ADD-ON: CPT

## 2017-01-01 PROCEDURE — 74011000258 HC RX REV CODE- 258: Performed by: EMERGENCY MEDICINE

## 2017-01-01 PROCEDURE — 74011250636 HC RX REV CODE- 250/636: Performed by: PSYCHIATRY & NEUROLOGY

## 2017-01-01 PROCEDURE — 80053 COMPREHEN METABOLIC PANEL: CPT | Performed by: HOSPITALIST

## 2017-01-01 PROCEDURE — 77030033263 HC DRSG MEPILEX 16-48IN BORD MOLN -B

## 2017-01-01 PROCEDURE — 74011000258 HC RX REV CODE- 258: Performed by: INTERNAL MEDICINE

## 2017-01-01 PROCEDURE — A9585 GADOBUTROL INJECTION: HCPCS | Performed by: HOSPITALIST

## 2017-01-01 PROCEDURE — 74011250637 HC RX REV CODE- 250/637: Performed by: INTERNAL MEDICINE

## 2017-01-01 PROCEDURE — 74011250636 HC RX REV CODE- 250/636: Performed by: FAMILY MEDICINE

## 2017-01-01 PROCEDURE — 3331090002 HH PPS REVENUE DEBIT

## 2017-01-01 PROCEDURE — 05JY3ZZ INSPECTION OF UPPER VEIN, PERCUTANEOUS APPROACH: ICD-10-PCS | Performed by: NEUROLOGICAL SURGERY

## 2017-01-01 PROCEDURE — 75810000133 HC LUMBAR PUNCTURE

## 2017-01-01 PROCEDURE — 89050 BODY FLUID CELL COUNT: CPT | Performed by: HOSPITALIST

## 2017-01-01 PROCEDURE — 74011250636 HC RX REV CODE- 250/636: Performed by: HOSPITALIST

## 2017-01-01 PROCEDURE — C1729 CATH, DRAINAGE: HCPCS | Performed by: NEUROLOGICAL SURGERY

## 2017-01-01 PROCEDURE — 009U3ZX DRAINAGE OF SPINAL CANAL, PERCUTANEOUS APPROACH, DIAGNOSTIC: ICD-10-PCS | Performed by: INTERNAL MEDICINE

## 2017-01-01 PROCEDURE — 86617 LYME DISEASE ANTIBODY: CPT | Performed by: EMERGENCY MEDICINE

## 2017-01-01 PROCEDURE — 74011000255 HC RX REV CODE- 255: Performed by: HOSPITALIST

## 2017-01-01 PROCEDURE — 400013 HH SOC

## 2017-01-01 PROCEDURE — 70450 CT HEAD/BRAIN W/O DYE: CPT

## 2017-01-01 PROCEDURE — G0157 HHC PT ASSISTANT EA 15: HCPCS

## 2017-01-01 PROCEDURE — A9585 GADOBUTROL INJECTION: HCPCS | Performed by: EMERGENCY MEDICINE

## 2017-01-01 PROCEDURE — 74011636637 HC RX REV CODE- 636/637: Performed by: EMERGENCY MEDICINE

## 2017-01-01 PROCEDURE — 77030029372 HC ADH SKN CLSR PRINEO J&J -C: Performed by: NEUROLOGICAL SURGERY

## 2017-01-01 PROCEDURE — 77030003666 HC NDL SPINAL BD -A

## 2017-01-01 PROCEDURE — 77030027138 HC INCENT SPIROMETER -A

## 2017-01-01 PROCEDURE — 85610 PROTHROMBIN TIME: CPT | Performed by: NEUROLOGICAL SURGERY

## 2017-01-01 PROCEDURE — 74011250636 HC RX REV CODE- 250/636

## 2017-01-01 PROCEDURE — 74011250636 HC RX REV CODE- 250/636: Performed by: EMERGENCY MEDICINE

## 2017-01-01 PROCEDURE — 82962 GLUCOSE BLOOD TEST: CPT

## 2017-01-01 PROCEDURE — 87327 CRYPTOCOCCUS NEOFORM AG IA: CPT | Performed by: EMERGENCY MEDICINE

## 2017-01-01 PROCEDURE — 36415 COLL VENOUS BLD VENIPUNCTURE: CPT | Performed by: PSYCHIATRY & NEUROLOGY

## 2017-01-01 PROCEDURE — 77010033678 HC OXYGEN DAILY

## 2017-01-01 PROCEDURE — 88108 CYTOPATH CONCENTRATE TECH: CPT | Performed by: HOSPITALIST

## 2017-01-01 PROCEDURE — 74011250636 HC RX REV CODE- 250/636: Performed by: INTERNAL MEDICINE

## 2017-01-01 PROCEDURE — 84157 ASSAY OF PROTEIN OTHER: CPT | Performed by: HOSPITALIST

## 2017-01-01 PROCEDURE — 94760 N-INVAS EAR/PLS OXIMETRY 1: CPT

## 2017-01-01 PROCEDURE — 74011250637 HC RX REV CODE- 250/637: Performed by: HOSPITALIST

## 2017-01-01 PROCEDURE — 77030010545

## 2017-01-01 PROCEDURE — 77030003666 HC NDL SPINAL BD -A: Performed by: NEUROLOGICAL SURGERY

## 2017-01-01 PROCEDURE — 65270000029 HC RM PRIVATE

## 2017-01-01 PROCEDURE — 87116 MYCOBACTERIA CULTURE: CPT | Performed by: INTERNAL MEDICINE

## 2017-01-01 PROCEDURE — C9113 INJ PANTOPRAZOLE SODIUM, VIA: HCPCS | Performed by: FAMILY MEDICINE

## 2017-01-01 PROCEDURE — 84157 ASSAY OF PROTEIN OTHER: CPT | Performed by: EMERGENCY MEDICINE

## 2017-01-01 PROCEDURE — 87102 FUNGUS ISOLATION CULTURE: CPT | Performed by: HOSPITALIST

## 2017-01-01 PROCEDURE — 77030014647 HC SEAL FBRN TISSL BAXT -D: Performed by: NEUROLOGICAL SURGERY

## 2017-01-01 PROCEDURE — 82746 ASSAY OF FOLIC ACID SERUM: CPT | Performed by: EMERGENCY MEDICINE

## 2017-01-01 PROCEDURE — 81001 URINALYSIS AUTO W/SCOPE: CPT | Performed by: EMERGENCY MEDICINE

## 2017-01-01 PROCEDURE — C1751 CATH, INF, PER/CENT/MIDLINE: HCPCS

## 2017-01-01 PROCEDURE — 65610000006 HC RM INTENSIVE CARE

## 2017-01-01 PROCEDURE — 74011250636 HC RX REV CODE- 250/636: Performed by: ANESTHESIOLOGY

## 2017-01-01 PROCEDURE — 85025 COMPLETE CBC W/AUTO DIFF WBC: CPT | Performed by: NEUROLOGICAL SURGERY

## 2017-01-01 PROCEDURE — 86592 SYPHILIS TEST NON-TREP QUAL: CPT | Performed by: EMERGENCY MEDICINE

## 2017-01-01 PROCEDURE — 76010000171 HC OR TIME 2 TO 2.5 HR INTENSV-TIER 1: Performed by: NEUROLOGICAL SURGERY

## 2017-01-01 PROCEDURE — G0151 HHCP-SERV OF PT,EA 15 MIN: HCPCS

## 2017-01-01 PROCEDURE — 65660000000 HC RM CCU STEPDOWN

## 2017-01-01 PROCEDURE — 74230 X-RAY XM SWLNG FUNCJ C+: CPT

## 2017-01-01 PROCEDURE — 80053 COMPREHEN METABOLIC PANEL: CPT | Performed by: EMERGENCY MEDICINE

## 2017-01-01 PROCEDURE — 74011000250 HC RX REV CODE- 250: Performed by: FAMILY MEDICINE

## 2017-01-01 PROCEDURE — 97535 SELF CARE MNGMENT TRAINING: CPT

## 2017-01-01 PROCEDURE — 77030034849

## 2017-01-01 PROCEDURE — G0152 HHCP-SERV OF OT,EA 15 MIN: HCPCS

## 2017-01-01 PROCEDURE — 87070 CULTURE OTHR SPECIMN AEROBIC: CPT | Performed by: HOSPITALIST

## 2017-01-01 PROCEDURE — 86592 SYPHILIS TEST NON-TREP QUAL: CPT | Performed by: HOSPITALIST

## 2017-01-01 PROCEDURE — 74011636637 HC RX REV CODE- 636/637: Performed by: FAMILY MEDICINE

## 2017-01-01 PROCEDURE — 73610 X-RAY EXAM OF ANKLE: CPT

## 2017-01-01 PROCEDURE — 74011250637 HC RX REV CODE- 250/637: Performed by: FAMILY MEDICINE

## 2017-01-01 PROCEDURE — 77030011943

## 2017-01-01 PROCEDURE — 85025 COMPLETE CBC W/AUTO DIFF WBC: CPT | Performed by: EMERGENCY MEDICINE

## 2017-01-01 PROCEDURE — 96365 THER/PROPH/DIAG IV INF INIT: CPT

## 2017-01-01 PROCEDURE — 88331 PATH CONSLTJ SURG 1 BLK 1SPC: CPT | Performed by: NEUROLOGICAL SURGERY

## 2017-01-01 PROCEDURE — 36415 COLL VENOUS BLD VENIPUNCTURE: CPT | Performed by: NEUROLOGICAL SURGERY

## 2017-01-01 PROCEDURE — 77030011256 HC DRSG MEPILEX <16IN NO BORD MOLN -A

## 2017-01-01 PROCEDURE — 76937 US GUIDE VASCULAR ACCESS: CPT | Performed by: INTERNAL MEDICINE

## 2017-01-01 PROCEDURE — 77030034850

## 2017-01-01 PROCEDURE — 86703 HIV-1/HIV-2 1 RESULT ANTBDY: CPT | Performed by: FAMILY MEDICINE

## 2017-01-01 PROCEDURE — 02HV33Z INSERTION OF INFUSION DEVICE INTO SUPERIOR VENA CAVA, PERCUTANEOUS APPROACH: ICD-10-PCS | Performed by: INTERNAL MEDICINE

## 2017-01-01 PROCEDURE — 74011250637 HC RX REV CODE- 250/637: Performed by: EMERGENCY MEDICINE

## 2017-01-01 PROCEDURE — A9585 GADOBUTROL INJECTION: HCPCS | Performed by: INTERNAL MEDICINE

## 2017-01-01 PROCEDURE — 70546 MR ANGIOGRAPH HEAD W/O&W/DYE: CPT

## 2017-01-01 PROCEDURE — C1894 INTRO/SHEATH, NON-LASER: HCPCS

## 2017-01-01 PROCEDURE — 8E09XBG COMPUTER ASSISTED PROCEDURE OF HEAD AND NECK REGION, WITH COMPUTERIZED TOMOGRAPHY: ICD-10-PCS | Performed by: NEUROLOGICAL SURGERY

## 2017-01-01 PROCEDURE — 74011000250 HC RX REV CODE- 250

## 2017-01-01 PROCEDURE — 87102 FUNGUS ISOLATION CULTURE: CPT | Performed by: NEUROLOGICAL SURGERY

## 2017-01-01 PROCEDURE — 87804 INFLUENZA ASSAY W/OPTIC: CPT | Performed by: EMERGENCY MEDICINE

## 2017-01-01 PROCEDURE — 77030018719 HC DRSG PTCH ANTIMIC J&J -A

## 2017-01-01 PROCEDURE — 76210000016 HC OR PH I REC 1 TO 1.5 HR: Performed by: NEUROLOGICAL SURGERY

## 2017-01-01 PROCEDURE — 77030018846 HC SOL IRR STRL H20 ICUM -A: Performed by: NEUROLOGICAL SURGERY

## 2017-01-01 PROCEDURE — 84443 ASSAY THYROID STIM HORMONE: CPT | Performed by: EMERGENCY MEDICINE

## 2017-01-01 PROCEDURE — 77030020847 HC STATLOK BARD -A

## 2017-01-01 PROCEDURE — 77030030163 HC BN WAX J&J -A: Performed by: NEUROLOGICAL SURGERY

## 2017-01-01 PROCEDURE — 36415 COLL VENOUS BLD VENIPUNCTURE: CPT | Performed by: INTERNAL MEDICINE

## 2017-01-01 PROCEDURE — 77030014143 HC TY PUNC LUMBR BD -A

## 2017-01-01 PROCEDURE — 009U3ZX DRAINAGE OF SPINAL CANAL, PERCUTANEOUS APPROACH, DIAGNOSTIC: ICD-10-PCS | Performed by: RADIOLOGY

## 2017-01-01 PROCEDURE — 87070 CULTURE OTHR SPECIMN AEROBIC: CPT | Performed by: EMERGENCY MEDICINE

## 2017-01-01 PROCEDURE — 84439 ASSAY OF FREE THYROXINE: CPT | Performed by: EMERGENCY MEDICINE

## 2017-01-01 PROCEDURE — 82550 ASSAY OF CK (CPK): CPT | Performed by: EMERGENCY MEDICINE

## 2017-01-01 PROCEDURE — 85025 COMPLETE CBC W/AUTO DIFF WBC: CPT | Performed by: HOSPITALIST

## 2017-01-01 PROCEDURE — 84443 ASSAY THYROID STIM HORMONE: CPT | Performed by: HOSPITALIST

## 2017-01-01 PROCEDURE — 96375 TX/PRO/DX INJ NEW DRUG ADDON: CPT

## 2017-01-01 PROCEDURE — 36569 INSJ PICC 5 YR+ W/O IMAGING: CPT | Performed by: INTERNAL MEDICINE

## 2017-01-01 PROCEDURE — 74011250636 HC RX REV CODE- 250/636: Performed by: NURSE PRACTITIONER

## 2017-01-01 PROCEDURE — 88307 TISSUE EXAM BY PATHOLOGIST: CPT | Performed by: NEUROLOGICAL SURGERY

## 2017-01-01 PROCEDURE — 99285 EMERGENCY DEPT VISIT HI MDM: CPT

## 2017-01-01 PROCEDURE — 77030032490 HC SLV COMPR SCD KNE COVD -B

## 2017-01-01 PROCEDURE — 85652 RBC SED RATE AUTOMATED: CPT | Performed by: EMERGENCY MEDICINE

## 2017-01-01 PROCEDURE — A9585 GADOBUTROL INJECTION: HCPCS | Performed by: NEUROLOGICAL SURGERY

## 2017-01-01 PROCEDURE — 72080 X-RAY EXAM THORACOLMB 2/> VW: CPT

## 2017-01-01 PROCEDURE — 76060000035 HC ANESTHESIA 2 TO 2.5 HR: Performed by: NEUROLOGICAL SURGERY

## 2017-01-01 PROCEDURE — 82945 GLUCOSE OTHER FLUID: CPT | Performed by: EMERGENCY MEDICINE

## 2017-01-01 PROCEDURE — 82607 VITAMIN B-12: CPT | Performed by: EMERGENCY MEDICINE

## 2017-01-01 PROCEDURE — 80048 BASIC METABOLIC PNL TOTAL CA: CPT | Performed by: INTERNAL MEDICINE

## 2017-01-01 PROCEDURE — 97530 THERAPEUTIC ACTIVITIES: CPT

## 2017-01-01 PROCEDURE — 86038 ANTINUCLEAR ANTIBODIES: CPT | Performed by: HOSPITALIST

## 2017-01-01 PROCEDURE — 82945 GLUCOSE OTHER FLUID: CPT | Performed by: HOSPITALIST

## 2017-01-01 PROCEDURE — 77030018786 HC NDL GD F/USND BARD -B

## 2017-01-01 PROCEDURE — 83916 OLIGOCLONAL BANDS: CPT | Performed by: HOSPITALIST

## 2017-01-01 PROCEDURE — 77030010547 HC BG URIN W/UMETER -A

## 2017-01-01 PROCEDURE — 83036 HEMOGLOBIN GLYCOSYLATED A1C: CPT | Performed by: EMERGENCY MEDICINE

## 2017-01-01 PROCEDURE — 73564 X-RAY EXAM KNEE 4 OR MORE: CPT

## 2017-01-01 PROCEDURE — 97162 PT EVAL MOD COMPLEX 30 MIN: CPT

## 2017-01-01 PROCEDURE — 77030018836 HC SOL IRR NACL ICUM -A: Performed by: NEUROLOGICAL SURGERY

## 2017-01-01 PROCEDURE — 77030019551 HC KT TRAJ BIOP GD MEDT -G: Performed by: NEUROLOGICAL SURGERY

## 2017-01-01 PROCEDURE — 77003 FLUOROGUIDE FOR SPINE INJECT: CPT

## 2017-01-01 PROCEDURE — 77030013473 HC CRD BPLR AESC -A: Performed by: NEUROLOGICAL SURGERY

## 2017-01-01 PROCEDURE — 92526 ORAL FUNCTION THERAPY: CPT

## 2017-01-01 PROCEDURE — 97166 OT EVAL MOD COMPLEX 45 MIN: CPT

## 2017-01-01 PROCEDURE — 97116 GAIT TRAINING THERAPY: CPT

## 2017-01-01 PROCEDURE — 99284 EMERGENCY DEPT VISIT MOD MDM: CPT

## 2017-01-01 PROCEDURE — 83916 OLIGOCLONAL BANDS: CPT | Performed by: EMERGENCY MEDICINE

## 2017-01-01 PROCEDURE — 77030032490 HC SLV COMPR SCD KNE COVD -B: Performed by: NEUROLOGICAL SURGERY

## 2017-01-01 PROCEDURE — 77030003669 XR SPINAL PUNC LUMB DX

## 2017-01-01 PROCEDURE — 89050 BODY FLUID CELL COUNT: CPT | Performed by: EMERGENCY MEDICINE

## 2017-01-01 PROCEDURE — 77030002946 HC SUT NRLN J&J -B: Performed by: NEUROLOGICAL SURGERY

## 2017-01-01 PROCEDURE — 77030014007 HC SPNG HEMSTAT J&J -B: Performed by: NEUROLOGICAL SURGERY

## 2017-01-01 PROCEDURE — 77030018390 HC SPNG HEMSTAT2 J&J -B: Performed by: NEUROLOGICAL SURGERY

## 2017-01-01 PROCEDURE — 86592 SYPHILIS TEST NON-TREP QUAL: CPT | Performed by: FAMILY MEDICINE

## 2017-01-01 PROCEDURE — 82542 COL CHROMOTOGRAPHY QUAL/QUAN: CPT | Performed by: PSYCHIATRY & NEUROLOGY

## 2017-01-01 PROCEDURE — 87116 MYCOBACTERIA CULTURE: CPT | Performed by: NEUROLOGICAL SURGERY

## 2017-01-01 PROCEDURE — 74011000250 HC RX REV CODE- 250: Performed by: EMERGENCY MEDICINE

## 2017-01-01 PROCEDURE — 87102 FUNGUS ISOLATION CULTURE: CPT | Performed by: EMERGENCY MEDICINE

## 2017-01-01 PROCEDURE — 83605 ASSAY OF LACTIC ACID: CPT

## 2017-01-01 PROCEDURE — 93306 TTE W/DOPPLER COMPLETE: CPT

## 2017-01-01 PROCEDURE — 99001 SPECIMEN HANDLING PT-LAB: CPT | Performed by: FAMILY MEDICINE

## 2017-01-01 PROCEDURE — 77030012602 HC SPNG PTTY NEUR J&J -B: Performed by: NEUROLOGICAL SURGERY

## 2017-01-01 PROCEDURE — 82164 ANGIOTENSIN I ENZYME TEST: CPT | Performed by: HOSPITALIST

## 2017-01-01 PROCEDURE — 87252 VIRUS INOCULATION TISSUE: CPT | Performed by: HOSPITALIST

## 2017-01-01 PROCEDURE — 71010 XR CHEST PORT: CPT

## 2017-01-01 PROCEDURE — 80053 COMPREHEN METABOLIC PANEL: CPT | Performed by: PSYCHIATRY & NEUROLOGY

## 2017-01-01 PROCEDURE — 82042 OTHER SOURCE ALBUMIN QUAN EA: CPT | Performed by: HOSPITALIST

## 2017-01-01 PROCEDURE — 82607 VITAMIN B-12: CPT | Performed by: FAMILY MEDICINE

## 2017-01-01 PROCEDURE — 74011000250 HC RX REV CODE- 250: Performed by: NEUROLOGICAL SURGERY

## 2017-01-01 PROCEDURE — 77030013079 HC BLNKT BAIR HGGR 3M -A: Performed by: NURSE ANESTHETIST, CERTIFIED REGISTERED

## 2017-01-01 PROCEDURE — 77030030722 HC PIN SKULL MAYFLD INLC -B: Performed by: NEUROLOGICAL SURGERY

## 2017-01-01 PROCEDURE — 87040 BLOOD CULTURE FOR BACTERIA: CPT | Performed by: EMERGENCY MEDICINE

## 2017-01-01 PROCEDURE — 74011000258 HC RX REV CODE- 258

## 2017-01-01 PROCEDURE — 74011000250 HC RX REV CODE- 250: Performed by: INTERNAL MEDICINE

## 2017-01-01 PROCEDURE — 96360 HYDRATION IV INFUSION INIT: CPT

## 2017-01-01 PROCEDURE — 74011000250 HC RX REV CODE- 250: Performed by: HOSPITALIST

## 2017-01-01 PROCEDURE — 87529 HSV DNA AMP PROBE: CPT | Performed by: INTERNAL MEDICINE

## 2017-01-01 PROCEDURE — 80048 BASIC METABOLIC PNL TOTAL CA: CPT | Performed by: NEUROLOGICAL SURGERY

## 2017-01-01 PROCEDURE — 85027 COMPLETE CBC AUTOMATED: CPT | Performed by: INTERNAL MEDICINE

## 2017-01-01 PROCEDURE — 00B93ZX EXCISION OF THALAMUS, PERCUTANEOUS APPROACH, DIAGNOSTIC: ICD-10-PCS | Performed by: NEUROLOGICAL SURGERY

## 2017-01-01 PROCEDURE — 74011000272 HC RX REV CODE- 272: Performed by: NEUROLOGICAL SURGERY

## 2017-01-01 PROCEDURE — 87086 URINE CULTURE/COLONY COUNT: CPT | Performed by: PSYCHIATRY & NEUROLOGY

## 2017-01-01 PROCEDURE — 77030020245 HC SOL INJ 5% D/0.9%NACL

## 2017-01-01 PROCEDURE — 77030003028 HC SUT VCRL J&J -A: Performed by: NEUROLOGICAL SURGERY

## 2017-01-01 PROCEDURE — 81001 URINALYSIS AUTO W/SCOPE: CPT | Performed by: PSYCHIATRY & NEUROLOGY

## 2017-01-01 PROCEDURE — 83735 ASSAY OF MAGNESIUM: CPT | Performed by: EMERGENCY MEDICINE

## 2017-01-01 PROCEDURE — 80202 ASSAY OF VANCOMYCIN: CPT | Performed by: INTERNAL MEDICINE

## 2017-01-01 PROCEDURE — 77030019552 HC NDL BIOP PASS MEDT -F: Performed by: NEUROLOGICAL SURGERY

## 2017-01-01 PROCEDURE — 77030028270 HC SRGFL HEMSTAT MTRX J&J -C: Performed by: NEUROLOGICAL SURGERY

## 2017-01-01 PROCEDURE — 92611 MOTION FLUOROSCOPY/SWALLOW: CPT

## 2017-01-01 PROCEDURE — 83873 ASSAY OF CSF PROTEIN: CPT | Performed by: EMERGENCY MEDICINE

## 2017-01-01 PROCEDURE — 77030008683 HC TU ET CUF COVD -A: Performed by: NURSE ANESTHETIST, CERTIFIED REGISTERED

## 2017-01-01 PROCEDURE — L4396 STATIC OR DYNAMI AFO PRE CST: HCPCS

## 2017-01-01 PROCEDURE — 80048 BASIC METABOLIC PNL TOTAL CA: CPT | Performed by: HOSPITALIST

## 2017-01-01 PROCEDURE — 92610 EVALUATE SWALLOWING FUNCTION: CPT

## 2017-01-01 RX ORDER — ADHESIVE BANDAGE
30 BANDAGE TOPICAL DAILY PRN
Status: DISCONTINUED | OUTPATIENT
Start: 2017-01-01 | End: 2017-01-01 | Stop reason: HOSPADM

## 2017-01-01 RX ORDER — ACETAMINOPHEN AND CODEINE PHOSPHATE 300; 30 MG/1; MG/1
1 TABLET ORAL
Qty: 20 TAB | Refills: 0 | Status: SHIPPED | OUTPATIENT
Start: 2017-01-01 | End: 2017-01-01

## 2017-01-01 RX ORDER — POLYETHYLENE GLYCOL 3350 17 G/17G
17 POWDER, FOR SOLUTION ORAL DAILY
Qty: 30 EACH | Refills: 1 | Status: SHIPPED | OUTPATIENT
Start: 2017-01-01

## 2017-01-01 RX ORDER — CEFAZOLIN SODIUM 2 G/50ML
2 SOLUTION INTRAVENOUS ONCE
Status: COMPLETED | OUTPATIENT
Start: 2017-01-01 | End: 2017-01-01

## 2017-01-01 RX ORDER — HYDROMORPHONE HYDROCHLORIDE 2 MG/ML
0.2 INJECTION, SOLUTION INTRAMUSCULAR; INTRAVENOUS; SUBCUTANEOUS AS NEEDED
Status: DISCONTINUED | OUTPATIENT
Start: 2017-01-01 | End: 2017-01-01 | Stop reason: HOSPADM

## 2017-01-01 RX ORDER — LIDOCAINE HYDROCHLORIDE AND EPINEPHRINE 10; 10 MG/ML; UG/ML
INJECTION, SOLUTION INFILTRATION; PERINEURAL AS NEEDED
Status: DISCONTINUED | OUTPATIENT
Start: 2017-01-01 | End: 2017-01-01 | Stop reason: HOSPADM

## 2017-01-01 RX ORDER — SODIUM CHLORIDE 9 MG/ML
50 INJECTION, SOLUTION INTRAVENOUS CONTINUOUS
Status: DISCONTINUED | OUTPATIENT
Start: 2017-01-01 | End: 2017-01-01 | Stop reason: HOSPADM

## 2017-01-01 RX ORDER — DEXAMETHASONE 2 MG/1
4 TABLET ORAL 4 TIMES DAILY
Status: DISCONTINUED | OUTPATIENT
Start: 2017-01-01 | End: 2017-01-01

## 2017-01-01 RX ORDER — DIPHENHYDRAMINE HYDROCHLORIDE 50 MG/ML
12.5 INJECTION, SOLUTION INTRAMUSCULAR; INTRAVENOUS
Status: DISCONTINUED | OUTPATIENT
Start: 2017-01-01 | End: 2017-01-01 | Stop reason: HOSPADM

## 2017-01-01 RX ORDER — AZATHIOPRINE 50 MG/1
50 TABLET ORAL DAILY
Qty: 30 TAB | Refills: 0 | Status: SHIPPED | OUTPATIENT
Start: 2017-01-01 | End: 2017-01-01

## 2017-01-01 RX ORDER — LIDOCAINE HYDROCHLORIDE 10 MG/ML
1-5 INJECTION INFILTRATION; PERINEURAL
Status: DISCONTINUED | OUTPATIENT
Start: 2017-01-01 | End: 2017-01-01

## 2017-01-01 RX ORDER — ACETAMINOPHEN AND CODEINE PHOSPHATE 300; 30 MG/1; MG/1
1 TABLET ORAL
COMMUNITY

## 2017-01-01 RX ORDER — DEXAMETHASONE 4 MG/1
TABLET ORAL
Qty: 8 TAB | Refills: 0 | Status: SHIPPED | OUTPATIENT
Start: 2017-01-01 | End: 2017-01-01

## 2017-01-01 RX ORDER — SODIUM CHLORIDE 0.9 % (FLUSH) 0.9 %
5-10 SYRINGE (ML) INJECTION AS NEEDED
Status: DISCONTINUED | OUTPATIENT
Start: 2017-01-01 | End: 2017-01-01 | Stop reason: HOSPADM

## 2017-01-01 RX ORDER — SODIUM CHLORIDE 0.9 % (FLUSH) 0.9 %
10-30 SYRINGE (ML) INJECTION AS NEEDED
Status: DISCONTINUED | OUTPATIENT
Start: 2017-01-01 | End: 2017-01-01 | Stop reason: HOSPADM

## 2017-01-01 RX ORDER — ACETAMINOPHEN 500 MG
TABLET ORAL
COMMUNITY

## 2017-01-01 RX ORDER — HYDROMORPHONE HYDROCHLORIDE 1 MG/ML
INJECTION, SOLUTION INTRAMUSCULAR; INTRAVENOUS; SUBCUTANEOUS AS NEEDED
Status: DISCONTINUED | OUTPATIENT
Start: 2017-01-01 | End: 2017-01-01 | Stop reason: HOSPADM

## 2017-01-01 RX ORDER — VANCOMYCIN HYDROCHLORIDE 1 G/20ML
INJECTION, POWDER, LYOPHILIZED, FOR SOLUTION INTRAVENOUS AS NEEDED
Status: DISCONTINUED | OUTPATIENT
Start: 2017-01-01 | End: 2017-01-01 | Stop reason: HOSPADM

## 2017-01-01 RX ORDER — TAMSULOSIN HYDROCHLORIDE 0.4 MG/1
0.4 CAPSULE ORAL DAILY
Status: DISCONTINUED | OUTPATIENT
Start: 2017-01-01 | End: 2017-01-01 | Stop reason: HOSPADM

## 2017-01-01 RX ORDER — DEXAMETHASONE SODIUM PHOSPHATE 4 MG/ML
INJECTION, SOLUTION INTRA-ARTICULAR; INTRALESIONAL; INTRAMUSCULAR; INTRAVENOUS; SOFT TISSUE AS NEEDED
Status: DISCONTINUED | OUTPATIENT
Start: 2017-01-01 | End: 2017-01-01 | Stop reason: HOSPADM

## 2017-01-01 RX ORDER — ACETAMINOPHEN AND CODEINE PHOSPHATE 300; 30 MG/1; MG/1
1 TABLET ORAL
Qty: 60 TAB | Refills: 0 | Status: SHIPPED | OUTPATIENT
Start: 2017-01-01 | End: 2017-01-01 | Stop reason: ALTCHOICE

## 2017-01-01 RX ORDER — ONDANSETRON 2 MG/ML
4 INJECTION INTRAMUSCULAR; INTRAVENOUS ONCE
Status: DISCONTINUED | OUTPATIENT
Start: 2017-01-01 | End: 2017-01-01 | Stop reason: HOSPADM

## 2017-01-01 RX ORDER — AZATHIOPRINE 50 MG/1
50 TABLET ORAL DAILY
Qty: 30 TAB | Refills: 0 | Status: SHIPPED
Start: 2017-01-01 | End: 2017-01-01

## 2017-01-01 RX ORDER — CEFTRIAXONE 1 G/1
1 INJECTION, POWDER, FOR SOLUTION INTRAMUSCULAR; INTRAVENOUS ONCE
Qty: 1 VIAL | Refills: 0
Start: 2017-01-01 | End: 2017-01-01

## 2017-01-01 RX ORDER — PROPOFOL 10 MG/ML
INJECTION, EMULSION INTRAVENOUS AS NEEDED
Status: DISCONTINUED | OUTPATIENT
Start: 2017-01-01 | End: 2017-01-01 | Stop reason: HOSPADM

## 2017-01-01 RX ORDER — SODIUM CHLORIDE 0.9 % (FLUSH) 0.9 %
10 SYRINGE (ML) INJECTION EVERY 24 HOURS
Status: DISCONTINUED | OUTPATIENT
Start: 2017-01-01 | End: 2017-01-01 | Stop reason: HOSPADM

## 2017-01-01 RX ORDER — DEXAMETHASONE SODIUM PHOSPHATE 4 MG/ML
4 INJECTION, SOLUTION INTRA-ARTICULAR; INTRALESIONAL; INTRAMUSCULAR; INTRAVENOUS; SOFT TISSUE EVERY 6 HOURS
Status: DISCONTINUED | OUTPATIENT
Start: 2017-01-01 | End: 2017-01-01

## 2017-01-01 RX ORDER — PREDNISONE 20 MG/1
60 TABLET ORAL
Status: DISCONTINUED | OUTPATIENT
Start: 2017-01-01 | End: 2017-01-01

## 2017-01-01 RX ORDER — BUPROPION HYDROCHLORIDE 300 MG/1
300 TABLET ORAL
Qty: 60 TAB | Refills: 0 | Status: SHIPPED | OUTPATIENT
Start: 2017-01-01

## 2017-01-01 RX ORDER — NICARDIPINE HYDROCHLORIDE 0.1 MG/ML
5-15 INJECTION INTRAVENOUS
Status: DISCONTINUED | OUTPATIENT
Start: 2017-01-01 | End: 2017-01-01 | Stop reason: CLARIF

## 2017-01-01 RX ORDER — AZATHIOPRINE 50 MG/1
50 TABLET ORAL DAILY
Status: DISCONTINUED | OUTPATIENT
Start: 2017-01-01 | End: 2017-01-01 | Stop reason: HOSPADM

## 2017-01-01 RX ORDER — BACITRACIN 500 [USP'U]/G
OINTMENT TOPICAL AS NEEDED
Status: DISCONTINUED | OUTPATIENT
Start: 2017-01-01 | End: 2017-01-01 | Stop reason: HOSPADM

## 2017-01-01 RX ORDER — BACITRACIN 500 UNIT/G
1 PACKET (EA) TOPICAL AS NEEDED
Qty: 1 PACKET | Refills: 0 | Status: SHIPPED
Start: 2017-01-01 | End: 2017-01-01 | Stop reason: ALTCHOICE

## 2017-01-01 RX ORDER — DEXAMETHASONE 4 MG/1
TABLET ORAL
Qty: 35 TAB | Refills: 0 | Status: SHIPPED | OUTPATIENT
Start: 2017-01-01 | End: 2017-01-01

## 2017-01-01 RX ORDER — NICARDIPINE HYDROCHLORIDE 0.1 MG/ML
5-15 INJECTION INTRAVENOUS
Status: DISCONTINUED | OUTPATIENT
Start: 2017-01-01 | End: 2017-01-01

## 2017-01-01 RX ORDER — ACETAMINOPHEN AND CODEINE PHOSPHATE 300; 30 MG/1; MG/1
1 TABLET ORAL
Status: DISCONTINUED | OUTPATIENT
Start: 2017-01-01 | End: 2017-01-01 | Stop reason: HOSPADM

## 2017-01-01 RX ORDER — DEXAMETHASONE SODIUM PHOSPHATE 4 MG/ML
6 INJECTION, SOLUTION INTRA-ARTICULAR; INTRALESIONAL; INTRAMUSCULAR; INTRAVENOUS; SOFT TISSUE EVERY 6 HOURS
Status: DISCONTINUED | OUTPATIENT
Start: 2017-01-01 | End: 2017-01-01 | Stop reason: HOSPADM

## 2017-01-01 RX ORDER — SODIUM CHLORIDE 0.9 % (FLUSH) 0.9 %
10 SYRINGE (ML) INJECTION AS NEEDED
Status: DISCONTINUED | OUTPATIENT
Start: 2017-01-01 | End: 2017-01-01 | Stop reason: HOSPADM

## 2017-01-01 RX ORDER — ACETAMINOPHEN 500 MG
1000 TABLET ORAL
Status: COMPLETED | OUTPATIENT
Start: 2017-01-01 | End: 2017-01-01

## 2017-01-01 RX ORDER — FENTANYL CITRATE 50 UG/ML
INJECTION, SOLUTION INTRAMUSCULAR; INTRAVENOUS AS NEEDED
Status: DISCONTINUED | OUTPATIENT
Start: 2017-01-01 | End: 2017-01-01 | Stop reason: HOSPADM

## 2017-01-01 RX ORDER — OXYCODONE AND ACETAMINOPHEN 5; 325 MG/1; MG/1
1 TABLET ORAL
Status: DISCONTINUED | OUTPATIENT
Start: 2017-01-01 | End: 2017-01-01 | Stop reason: HOSPADM

## 2017-01-01 RX ORDER — SODIUM CHLORIDE 0.9 % (FLUSH) 0.9 %
10-40 SYRINGE (ML) INJECTION EVERY 8 HOURS
Status: DISCONTINUED | OUTPATIENT
Start: 2017-01-01 | End: 2017-01-01 | Stop reason: HOSPADM

## 2017-01-01 RX ORDER — HYDROMORPHONE HYDROCHLORIDE 2 MG/ML
0.5 INJECTION, SOLUTION INTRAMUSCULAR; INTRAVENOUS; SUBCUTANEOUS
Status: DISCONTINUED | OUTPATIENT
Start: 2017-01-01 | End: 2017-01-01 | Stop reason: HOSPADM

## 2017-01-01 RX ORDER — NYSTATIN 100000 [USP'U]/ML
1 SUSPENSION ORAL 4 TIMES DAILY
Qty: 140 ML | Refills: 0 | Status: SHIPPED | OUTPATIENT
Start: 2017-01-01

## 2017-01-01 RX ORDER — NYSTATIN 100000 [USP'U]/ML
500000 SUSPENSION ORAL 4 TIMES DAILY
Status: DISCONTINUED | OUTPATIENT
Start: 2017-01-01 | End: 2017-01-01 | Stop reason: HOSPADM

## 2017-01-01 RX ORDER — CEFAZOLIN SODIUM 2 G/50ML
2 SOLUTION INTRAVENOUS EVERY 8 HOURS
Status: DISPENSED | OUTPATIENT
Start: 2017-01-01 | End: 2017-01-01

## 2017-01-01 RX ORDER — GLYCOPYRROLATE 0.2 MG/ML
INJECTION INTRAMUSCULAR; INTRAVENOUS AS NEEDED
Status: DISCONTINUED | OUTPATIENT
Start: 2017-01-01 | End: 2017-01-01 | Stop reason: HOSPADM

## 2017-01-01 RX ORDER — LIDOCAINE HYDROCHLORIDE 20 MG/ML
INJECTION, SOLUTION EPIDURAL; INFILTRATION; INTRACAUDAL; PERINEURAL AS NEEDED
Status: DISCONTINUED | OUTPATIENT
Start: 2017-01-01 | End: 2017-01-01 | Stop reason: HOSPADM

## 2017-01-01 RX ORDER — HYDROMORPHONE HYDROCHLORIDE 1 MG/ML
1 INJECTION, SOLUTION INTRAMUSCULAR; INTRAVENOUS; SUBCUTANEOUS
Status: DISCONTINUED | OUTPATIENT
Start: 2017-01-01 | End: 2017-01-01

## 2017-01-01 RX ORDER — FACIAL-BODY WIPES
10 EACH TOPICAL DAILY PRN
Status: DISCONTINUED | OUTPATIENT
Start: 2017-01-01 | End: 2017-01-01 | Stop reason: HOSPADM

## 2017-01-01 RX ORDER — ROCURONIUM BROMIDE 10 MG/ML
INJECTION, SOLUTION INTRAVENOUS AS NEEDED
Status: DISCONTINUED | OUTPATIENT
Start: 2017-01-01 | End: 2017-01-01 | Stop reason: HOSPADM

## 2017-01-01 RX ORDER — NEOSTIGMINE METHYLSULFATE 5 MG/5 ML
SYRINGE (ML) INTRAVENOUS AS NEEDED
Status: DISCONTINUED | OUTPATIENT
Start: 2017-01-01 | End: 2017-01-01 | Stop reason: HOSPADM

## 2017-01-01 RX ORDER — ACETAMINOPHEN 325 MG/1
650 TABLET ORAL
Status: DISCONTINUED | OUTPATIENT
Start: 2017-01-01 | End: 2017-01-01 | Stop reason: HOSPADM

## 2017-01-01 RX ORDER — ONDANSETRON 2 MG/ML
4 INJECTION INTRAMUSCULAR; INTRAVENOUS
Status: DISCONTINUED | OUTPATIENT
Start: 2017-01-01 | End: 2017-01-01 | Stop reason: HOSPADM

## 2017-01-01 RX ORDER — LIDOCAINE HYDROCHLORIDE 10 MG/ML
1-5 INJECTION, SOLUTION EPIDURAL; INFILTRATION; INTRACAUDAL; PERINEURAL
Status: COMPLETED | OUTPATIENT
Start: 2017-01-01 | End: 2017-01-01

## 2017-01-01 RX ORDER — DEXAMETHASONE SODIUM PHOSPHATE 4 MG/ML
4 INJECTION, SOLUTION INTRA-ARTICULAR; INTRALESIONAL; INTRAMUSCULAR; INTRAVENOUS; SOFT TISSUE EVERY 6 HOURS
Status: DISCONTINUED | OUTPATIENT
Start: 2017-01-01 | End: 2017-01-01 | Stop reason: CLARIF

## 2017-01-01 RX ORDER — AMOXICILLIN 250 MG
2 CAPSULE ORAL
Status: COMPLETED | OUTPATIENT
Start: 2017-01-01 | End: 2017-01-01

## 2017-01-01 RX ORDER — CYANOCOBALAMIN 1000 UG/ML
1000 INJECTION, SOLUTION INTRAMUSCULAR; SUBCUTANEOUS EVERY OTHER DAY
Status: DISCONTINUED | OUTPATIENT
Start: 2017-01-01 | End: 2017-01-01 | Stop reason: HOSPADM

## 2017-01-01 RX ORDER — PANTOPRAZOLE SODIUM 40 MG/1
40 TABLET, DELAYED RELEASE ORAL DAILY
Status: DISCONTINUED | OUTPATIENT
Start: 2017-01-01 | End: 2017-01-01 | Stop reason: HOSPADM

## 2017-01-01 RX ORDER — DEXAMETHASONE 4 MG/1
4 TABLET ORAL EVERY 12 HOURS
Status: DISCONTINUED | OUTPATIENT
Start: 2017-01-01 | End: 2017-01-01 | Stop reason: HOSPADM

## 2017-01-01 RX ORDER — PREDNISONE 10 MG/1
10 TABLET ORAL
Qty: 100 TAB | Refills: 0 | Status: SHIPPED | OUTPATIENT
Start: 2017-01-01 | End: 2017-01-01

## 2017-01-01 RX ORDER — BUPROPION HYDROCHLORIDE 300 MG/1
300 TABLET ORAL
Status: DISCONTINUED | OUTPATIENT
Start: 2017-01-01 | End: 2017-01-01 | Stop reason: HOSPADM

## 2017-01-01 RX ORDER — FAMOTIDINE 10 MG/ML
20 INJECTION INTRAVENOUS
Status: COMPLETED | OUTPATIENT
Start: 2017-01-01 | End: 2017-01-01

## 2017-01-01 RX ORDER — PREDNISONE 20 MG/1
60 TABLET ORAL
Qty: 21 TAB | Refills: 0 | Status: SHIPPED | OUTPATIENT
Start: 2017-01-01 | End: 2017-01-01

## 2017-01-01 RX ORDER — SODIUM CHLORIDE 9 MG/ML
100 INJECTION, SOLUTION INTRAVENOUS CONTINUOUS
Status: DISCONTINUED | OUTPATIENT
Start: 2017-01-01 | End: 2017-01-01

## 2017-01-01 RX ORDER — PREDNISONE 20 MG/1
60 TABLET ORAL
Status: COMPLETED | OUTPATIENT
Start: 2017-01-01 | End: 2017-01-01

## 2017-01-01 RX ORDER — AZATHIOPRINE 50 MG/1
50 TABLET ORAL DAILY
Qty: 60 TAB | Refills: 0 | Status: SHIPPED | OUTPATIENT
Start: 2017-01-01 | End: 2017-01-01 | Stop reason: SDUPTHER

## 2017-01-01 RX ORDER — ESMOLOL HYDROCHLORIDE 10 MG/ML
INJECTION INTRAVENOUS AS NEEDED
Status: DISCONTINUED | OUTPATIENT
Start: 2017-01-01 | End: 2017-01-01 | Stop reason: HOSPADM

## 2017-01-01 RX ORDER — PANTOPRAZOLE SODIUM 40 MG/1
40 TABLET, DELAYED RELEASE ORAL
Status: COMPLETED | OUTPATIENT
Start: 2017-01-01 | End: 2017-01-01

## 2017-01-01 RX ORDER — BACITRACIN 500 UNIT/G
1 PACKET (EA) TOPICAL AS NEEDED
Status: DISCONTINUED | OUTPATIENT
Start: 2017-01-01 | End: 2017-01-01 | Stop reason: HOSPADM

## 2017-01-01 RX ORDER — LIDOCAINE HYDROCHLORIDE 10 MG/ML
1-5 INJECTION INFILTRATION; PERINEURAL
Status: COMPLETED | OUTPATIENT
Start: 2017-01-01 | End: 2017-01-01

## 2017-01-01 RX ORDER — POLYETHYLENE GLYCOL 3350 17 G/17G
17 POWDER, FOR SOLUTION ORAL DAILY
Status: DISCONTINUED | OUTPATIENT
Start: 2017-01-01 | End: 2017-01-01 | Stop reason: HOSPADM

## 2017-01-01 RX ORDER — DEXTROSE MONOHYDRATE 25 G/50ML
25-50 INJECTION, SOLUTION INTRAVENOUS AS NEEDED
Status: DISCONTINUED | OUTPATIENT
Start: 2017-01-01 | End: 2017-01-01 | Stop reason: HOSPADM

## 2017-01-01 RX ORDER — TAMSULOSIN HYDROCHLORIDE 0.4 MG/1
0.4 CAPSULE ORAL DAILY
Qty: 60 CAP | Refills: 0 | Status: SHIPPED | OUTPATIENT
Start: 2017-01-01 | End: 2017-01-01

## 2017-01-01 RX ORDER — DEXAMETHASONE 4 MG/1
4 TABLET ORAL EVERY 6 HOURS
Status: DISCONTINUED | OUTPATIENT
Start: 2017-01-01 | End: 2017-01-01 | Stop reason: HOSPADM

## 2017-01-01 RX ORDER — ENOXAPARIN SODIUM 100 MG/ML
40 INJECTION SUBCUTANEOUS EVERY 24 HOURS
Status: DISCONTINUED | OUTPATIENT
Start: 2017-01-01 | End: 2017-01-01 | Stop reason: HOSPADM

## 2017-01-01 RX ORDER — MAGNESIUM SULFATE 100 %
4 CRYSTALS MISCELLANEOUS AS NEEDED
Status: DISCONTINUED | OUTPATIENT
Start: 2017-01-01 | End: 2017-01-01 | Stop reason: HOSPADM

## 2017-01-01 RX ORDER — BUPROPION HYDROCHLORIDE 150 MG/1
300 TABLET ORAL
Status: DISCONTINUED | OUTPATIENT
Start: 2017-01-01 | End: 2017-01-01 | Stop reason: HOSPADM

## 2017-01-01 RX ORDER — CEFAZOLIN SODIUM 2 G/50ML
2 SOLUTION INTRAVENOUS
Status: COMPLETED | OUTPATIENT
Start: 2017-01-01 | End: 2017-01-01

## 2017-01-01 RX ORDER — DEXAMETHASONE 4 MG/1
4 TABLET ORAL EVERY 8 HOURS
Status: COMPLETED | OUTPATIENT
Start: 2017-01-01 | End: 2017-01-01

## 2017-01-01 RX ORDER — OMEPRAZOLE 10 MG/1
10 CAPSULE, DELAYED RELEASE ORAL DAILY
COMMUNITY

## 2017-01-01 RX ORDER — SODIUM CHLORIDE, SODIUM LACTATE, POTASSIUM CHLORIDE, CALCIUM CHLORIDE 600; 310; 30; 20 MG/100ML; MG/100ML; MG/100ML; MG/100ML
100 INJECTION, SOLUTION INTRAVENOUS CONTINUOUS
Status: DISCONTINUED | OUTPATIENT
Start: 2017-01-01 | End: 2017-01-01 | Stop reason: HOSPADM

## 2017-01-01 RX ORDER — AZATHIOPRINE 50 MG/1
50 TABLET ORAL
Status: COMPLETED | OUTPATIENT
Start: 2017-01-01 | End: 2017-01-01

## 2017-01-01 RX ORDER — OXYCODONE AND ACETAMINOPHEN 5; 325 MG/1; MG/1
1 TABLET ORAL
Qty: 20 TAB | Refills: 0 | Status: SHIPPED | OUTPATIENT
Start: 2017-01-01

## 2017-01-01 RX ORDER — PHYTONADIONE 5 MG/1
10 TABLET ORAL
Status: COMPLETED | OUTPATIENT
Start: 2017-01-01 | End: 2017-01-01

## 2017-01-01 RX ORDER — PREDNISONE 10 MG/1
10 TABLET ORAL
Status: DISCONTINUED | OUTPATIENT
Start: 2017-01-01 | End: 2017-01-01 | Stop reason: HOSPADM

## 2017-01-01 RX ORDER — DEXAMETHASONE 4 MG/1
TABLET ORAL
Qty: 60 TAB | Refills: 0 | Status: SHIPPED
Start: 2017-01-01

## 2017-01-01 RX ORDER — AZATHIOPRINE 50 MG/1
50 TABLET ORAL DAILY
Qty: 30 TAB | Refills: 0 | Status: CANCELLED | OUTPATIENT
Start: 2017-01-01

## 2017-01-01 RX ORDER — KETOROLAC TROMETHAMINE 30 MG/ML
60 INJECTION, SOLUTION INTRAMUSCULAR; INTRAVENOUS ONCE
Qty: 1 VIAL | Refills: 0
Start: 2017-01-01 | End: 2017-01-01

## 2017-01-01 RX ORDER — SODIUM CHLORIDE 0.9 % (FLUSH) 0.9 %
20 SYRINGE (ML) INJECTION EVERY 24 HOURS
Status: DISCONTINUED | OUTPATIENT
Start: 2017-01-01 | End: 2017-01-01 | Stop reason: HOSPADM

## 2017-01-01 RX ORDER — AZATHIOPRINE 50 MG/1
100 TABLET ORAL DAILY
Qty: 60 TAB | Refills: 0 | Status: SHIPPED
Start: 2017-01-01

## 2017-01-01 RX ORDER — MIDAZOLAM HYDROCHLORIDE 1 MG/ML
INJECTION, SOLUTION INTRAMUSCULAR; INTRAVENOUS AS NEEDED
Status: DISCONTINUED | OUTPATIENT
Start: 2017-01-01 | End: 2017-01-01 | Stop reason: HOSPADM

## 2017-01-01 RX ORDER — SODIUM CHLORIDE 0.9 % (FLUSH) 0.9 %
5-10 SYRINGE (ML) INJECTION EVERY 8 HOURS
Status: DISCONTINUED | OUTPATIENT
Start: 2017-01-01 | End: 2017-01-01

## 2017-01-01 RX ORDER — DIPHENHYDRAMINE HYDROCHLORIDE 50 MG/ML
25 INJECTION, SOLUTION INTRAMUSCULAR; INTRAVENOUS
Status: COMPLETED | OUTPATIENT
Start: 2017-01-01 | End: 2017-01-01

## 2017-01-01 RX ORDER — OXYCODONE AND ACETAMINOPHEN 5; 325 MG/1; MG/1
1 TABLET ORAL AS NEEDED
Status: DISCONTINUED | OUTPATIENT
Start: 2017-01-01 | End: 2017-01-01 | Stop reason: HOSPADM

## 2017-01-01 RX ORDER — ONDANSETRON 2 MG/ML
INJECTION INTRAMUSCULAR; INTRAVENOUS AS NEEDED
Status: DISCONTINUED | OUTPATIENT
Start: 2017-01-01 | End: 2017-01-01 | Stop reason: HOSPADM

## 2017-01-01 RX ORDER — INSULIN LISPRO 100 [IU]/ML
INJECTION, SOLUTION INTRAVENOUS; SUBCUTANEOUS ONCE
Status: DISCONTINUED | OUTPATIENT
Start: 2017-01-01 | End: 2017-01-01 | Stop reason: HOSPADM

## 2017-01-01 RX ADMIN — NYSTATIN 500000 UNITS: 100000 SUSPENSION ORAL at 13:56

## 2017-01-01 RX ADMIN — NYSTATIN 500000 UNITS: 100000 SUSPENSION ORAL at 08:28

## 2017-01-01 RX ADMIN — SODIUM CHLORIDE 50 ML/HR: 900 INJECTION, SOLUTION INTRAVENOUS at 23:41

## 2017-01-01 RX ADMIN — DEXAMETHASONE SODIUM PHOSPHATE 4 MG: 4 INJECTION, SOLUTION INTRAMUSCULAR; INTRAVENOUS at 23:45

## 2017-01-01 RX ADMIN — CEFTRIAXONE 2 G: 2 INJECTION, POWDER, FOR SOLUTION INTRAMUSCULAR; INTRAVENOUS at 09:47

## 2017-01-01 RX ADMIN — SODIUM CHLORIDE 150 ML/HR: 900 INJECTION, SOLUTION INTRAVENOUS at 01:59

## 2017-01-01 RX ADMIN — DEXAMETHASONE 4 MG: 4 TABLET ORAL at 06:07

## 2017-01-01 RX ADMIN — PREDNISONE 60 MG: 20 TABLET ORAL at 21:05

## 2017-01-01 RX ADMIN — DEXAMETHASONE 4 MG: 4 TABLET ORAL at 05:18

## 2017-01-01 RX ADMIN — TAMSULOSIN HYDROCHLORIDE 0.4 MG: 0.4 CAPSULE ORAL at 08:52

## 2017-01-01 RX ADMIN — SODIUM CHLORIDE 40 MG: 9 INJECTION INTRAMUSCULAR; INTRAVENOUS; SUBCUTANEOUS at 10:31

## 2017-01-01 RX ADMIN — SODIUM CHLORIDE 50 ML/HR: 900 INJECTION, SOLUTION INTRAVENOUS at 18:40

## 2017-01-01 RX ADMIN — NYSTATIN 500000 UNITS: 100000 SUSPENSION ORAL at 09:42

## 2017-01-01 RX ADMIN — CEFTRIAXONE 2 G: 2 INJECTION, POWDER, FOR SOLUTION INTRAMUSCULAR; INTRAVENOUS at 10:00

## 2017-01-01 RX ADMIN — NYSTATIN 500000 UNITS: 100000 SUSPENSION ORAL at 22:19

## 2017-01-01 RX ADMIN — Medication 10 ML: at 08:11

## 2017-01-01 RX ADMIN — TAMSULOSIN HYDROCHLORIDE 0.4 MG: 0.4 CAPSULE ORAL at 09:38

## 2017-01-01 RX ADMIN — ESMOLOL HYDROCHLORIDE 40 MG: 10 INJECTION INTRAVENOUS at 13:10

## 2017-01-01 RX ADMIN — ACETAMINOPHEN 1000 MG: 500 TABLET ORAL at 20:39

## 2017-01-01 RX ADMIN — TAMSULOSIN HYDROCHLORIDE 0.4 MG: 0.4 CAPSULE ORAL at 08:55

## 2017-01-01 RX ADMIN — HYDROMORPHONE HYDROCHLORIDE 0.5 MG: 1 INJECTION, SOLUTION INTRAMUSCULAR; INTRAVENOUS; SUBCUTANEOUS at 13:27

## 2017-01-01 RX ADMIN — ENOXAPARIN SODIUM 40 MG: 40 INJECTION SUBCUTANEOUS at 02:36

## 2017-01-01 RX ADMIN — CEFTRIAXONE 2 G: 2 INJECTION, POWDER, FOR SOLUTION INTRAMUSCULAR; INTRAVENOUS at 10:18

## 2017-01-01 RX ADMIN — SODIUM CHLORIDE 2250 MG: 900 INJECTION, SOLUTION INTRAVENOUS at 22:45

## 2017-01-01 RX ADMIN — DEXAMETHASONE SODIUM PHOSPHATE 4 MG: 4 INJECTION, SOLUTION INTRAMUSCULAR; INTRAVENOUS at 12:13

## 2017-01-01 RX ADMIN — GADOBUTROL 7.5 ML: 604.72 INJECTION INTRAVENOUS at 19:19

## 2017-01-01 RX ADMIN — BUPROPION HYDROCHLORIDE 300 MG: 300 TABLET, EXTENDED RELEASE ORAL at 07:00

## 2017-01-01 RX ADMIN — CEFTRIAXONE 2 G: 2 INJECTION, POWDER, FOR SOLUTION INTRAMUSCULAR; INTRAVENOUS at 21:56

## 2017-01-01 RX ADMIN — SODIUM CHLORIDE 1000 MG: 900 INJECTION, SOLUTION INTRAVENOUS at 02:36

## 2017-01-01 RX ADMIN — BUPROPION HYDROCHLORIDE 300 MG: 300 TABLET, EXTENDED RELEASE ORAL at 06:01

## 2017-01-01 RX ADMIN — NYSTATIN 500000 UNITS: 100000 SUSPENSION ORAL at 12:12

## 2017-01-01 RX ADMIN — AZATHIOPRINE 50 MG: 50 TABLET ORAL at 09:41

## 2017-01-01 RX ADMIN — BUPROPION HYDROCHLORIDE 300 MG: 300 TABLET, EXTENDED RELEASE ORAL at 06:11

## 2017-01-01 RX ADMIN — DEXAMETHASONE SODIUM PHOSPHATE 4 MG: 4 INJECTION, SOLUTION INTRAMUSCULAR; INTRAVENOUS at 23:44

## 2017-01-01 RX ADMIN — PREDNISONE 60 MG: 20 TABLET ORAL at 10:26

## 2017-01-01 RX ADMIN — CEFTRIAXONE 2 G: 2 INJECTION, POWDER, FOR SOLUTION INTRAMUSCULAR; INTRAVENOUS at 09:34

## 2017-01-01 RX ADMIN — SODIUM CHLORIDE 40 MG: 9 INJECTION INTRAMUSCULAR; INTRAVENOUS; SUBCUTANEOUS at 08:35

## 2017-01-01 RX ADMIN — NYSTATIN 500000 UNITS: 100000 SUSPENSION ORAL at 17:32

## 2017-01-01 RX ADMIN — Medication 10 ML: at 17:49

## 2017-01-01 RX ADMIN — BUPROPION HYDROCHLORIDE 300 MG: 300 TABLET, EXTENDED RELEASE ORAL at 08:56

## 2017-01-01 RX ADMIN — HYDROMORPHONE HYDROCHLORIDE 0.5 MG: 1 INJECTION, SOLUTION INTRAMUSCULAR; INTRAVENOUS; SUBCUTANEOUS at 13:33

## 2017-01-01 RX ADMIN — DEXAMETHASONE SODIUM PHOSPHATE 6 MG: 4 INJECTION, SOLUTION INTRAMUSCULAR; INTRAVENOUS at 08:19

## 2017-01-01 RX ADMIN — TAMSULOSIN HYDROCHLORIDE 0.4 MG: 0.4 CAPSULE ORAL at 09:34

## 2017-01-01 RX ADMIN — LIDOCAINE HYDROCHLORIDE 100 MG: 20 INJECTION, SOLUTION EPIDURAL; INFILTRATION; INTRACAUDAL; PERINEURAL at 12:58

## 2017-01-01 RX ADMIN — DEXAMETHASONE SODIUM PHOSPHATE 6 MG: 4 INJECTION, SOLUTION INTRAMUSCULAR; INTRAVENOUS at 18:44

## 2017-01-01 RX ADMIN — SODIUM CHLORIDE 150 ML/HR: 900 INJECTION, SOLUTION INTRAVENOUS at 08:33

## 2017-01-01 RX ADMIN — DEXAMETHASONE SODIUM PHOSPHATE 4 MG: 4 INJECTION, SOLUTION INTRAMUSCULAR; INTRAVENOUS at 00:30

## 2017-01-01 RX ADMIN — SODIUM CHLORIDE 40 MG: 9 INJECTION INTRAMUSCULAR; INTRAVENOUS; SUBCUTANEOUS at 09:34

## 2017-01-01 RX ADMIN — SODIUM CHLORIDE 50 ML/HR: 900 INJECTION, SOLUTION INTRAVENOUS at 17:21

## 2017-01-01 RX ADMIN — BUPROPION HYDROCHLORIDE 300 MG: 300 TABLET, EXTENDED RELEASE ORAL at 08:28

## 2017-01-01 RX ADMIN — GADOBUTROL 7.5 ML: 604.72 INJECTION INTRAVENOUS at 19:15

## 2017-01-01 RX ADMIN — CEFAZOLIN SODIUM 2 G: 2 SOLUTION INTRAVENOUS at 21:04

## 2017-01-01 RX ADMIN — DEXAMETHASONE SODIUM PHOSPHATE 6 MG: 4 INJECTION, SOLUTION INTRAMUSCULAR; INTRAVENOUS at 02:00

## 2017-01-01 RX ADMIN — SODIUM CHLORIDE 50 ML/HR: 900 INJECTION, SOLUTION INTRAVENOUS at 06:22

## 2017-01-01 RX ADMIN — AZATHIOPRINE 50 MG: 50 TABLET ORAL at 09:05

## 2017-01-01 RX ADMIN — NYSTATIN 500000 UNITS: 100000 SUSPENSION ORAL at 10:20

## 2017-01-01 RX ADMIN — AZATHIOPRINE 50 MG: 50 TABLET ORAL at 10:23

## 2017-01-01 RX ADMIN — NYSTATIN 500000 UNITS: 100000 SUSPENSION ORAL at 13:00

## 2017-01-01 RX ADMIN — BUPROPION HYDROCHLORIDE 300 MG: 300 TABLET, EXTENDED RELEASE ORAL at 10:26

## 2017-01-01 RX ADMIN — ENOXAPARIN SODIUM 40 MG: 40 INJECTION SUBCUTANEOUS at 01:29

## 2017-01-01 RX ADMIN — DEXAMETHASONE 4 MG: 4 TABLET ORAL at 14:00

## 2017-01-01 RX ADMIN — TAMSULOSIN HYDROCHLORIDE 0.4 MG: 0.4 CAPSULE ORAL at 09:00

## 2017-01-01 RX ADMIN — Medication 10 ML: at 13:46

## 2017-01-01 RX ADMIN — DEXAMETHASONE 4 MG: 4 TABLET ORAL at 06:00

## 2017-01-01 RX ADMIN — CEFTRIAXONE 2 G: 2 INJECTION, POWDER, FOR SOLUTION INTRAMUSCULAR; INTRAVENOUS at 20:32

## 2017-01-01 RX ADMIN — Medication 10 ML: at 17:43

## 2017-01-01 RX ADMIN — NYSTATIN 500000 UNITS: 100000 SUSPENSION ORAL at 21:57

## 2017-01-01 RX ADMIN — DEXAMETHASONE SODIUM PHOSPHATE 6 MG: 4 INJECTION, SOLUTION INTRAMUSCULAR; INTRAVENOUS at 13:34

## 2017-01-01 RX ADMIN — DEXAMETHASONE 4 MG: 4 TABLET ORAL at 01:29

## 2017-01-01 RX ADMIN — DEXAMETHASONE SODIUM PHOSPHATE 4 MG: 4 INJECTION, SOLUTION INTRAMUSCULAR; INTRAVENOUS at 17:13

## 2017-01-01 RX ADMIN — SODIUM CHLORIDE 150 ML/HR: 900 INJECTION, SOLUTION INTRAVENOUS at 02:39

## 2017-01-01 RX ADMIN — DEXAMETHASONE SODIUM PHOSPHATE 4 MG: 4 INJECTION, SOLUTION INTRAMUSCULAR; INTRAVENOUS at 18:11

## 2017-01-01 RX ADMIN — AZATHIOPRINE 50 MG: 50 TABLET ORAL at 11:52

## 2017-01-01 RX ADMIN — Medication 10 ML: at 17:04

## 2017-01-01 RX ADMIN — DEXAMETHASONE 4 MG: 4 TABLET ORAL at 21:37

## 2017-01-01 RX ADMIN — AZATHIOPRINE 50 MG: 50 TABLET ORAL at 09:00

## 2017-01-01 RX ADMIN — GADOBUTROL 7.5 ML: 604.72 INJECTION INTRAVENOUS at 15:39

## 2017-01-01 RX ADMIN — DEXAMETHASONE SODIUM PHOSPHATE 6 MG: 4 INJECTION, SOLUTION INTRAMUSCULAR; INTRAVENOUS at 22:46

## 2017-01-01 RX ADMIN — DEXAMETHASONE SODIUM PHOSPHATE 4 MG: 4 INJECTION, SOLUTION INTRAMUSCULAR; INTRAVENOUS at 17:04

## 2017-01-01 RX ADMIN — SODIUM CHLORIDE 150 ML/HR: 900 INJECTION, SOLUTION INTRAVENOUS at 05:48

## 2017-01-01 RX ADMIN — BUPROPION HYDROCHLORIDE 300 MG: 300 TABLET, EXTENDED RELEASE ORAL at 06:34

## 2017-01-01 RX ADMIN — ENOXAPARIN SODIUM 40 MG: 40 INJECTION SUBCUTANEOUS at 02:09

## 2017-01-01 RX ADMIN — DEXAMETHASONE SODIUM PHOSPHATE 4 MG: 4 INJECTION, SOLUTION INTRAMUSCULAR; INTRAVENOUS at 23:15

## 2017-01-01 RX ADMIN — DEXAMETHASONE 4 MG: 4 TABLET ORAL at 17:49

## 2017-01-01 RX ADMIN — DEXAMETHASONE 4 MG: 4 TABLET ORAL at 22:05

## 2017-01-01 RX ADMIN — ROCURONIUM BROMIDE 10 MG: 10 INJECTION, SOLUTION INTRAVENOUS at 13:43

## 2017-01-01 RX ADMIN — HYDROMORPHONE HYDROCHLORIDE 1 MG: 1 INJECTION, SOLUTION INTRAMUSCULAR; INTRAVENOUS; SUBCUTANEOUS at 19:17

## 2017-01-01 RX ADMIN — DEXAMETHASONE SODIUM PHOSPHATE 6 MG: 4 INJECTION, SOLUTION INTRAMUSCULAR; INTRAVENOUS at 01:31

## 2017-01-01 RX ADMIN — DEXAMETHASONE 4 MG: 4 TABLET ORAL at 06:38

## 2017-01-01 RX ADMIN — DEXAMETHASONE SODIUM PHOSPHATE 4 MG: 4 INJECTION, SOLUTION INTRAMUSCULAR; INTRAVENOUS at 06:22

## 2017-01-01 RX ADMIN — DEXAMETHASONE 4 MG: 4 TABLET ORAL at 06:34

## 2017-01-01 RX ADMIN — DEXAMETHASONE 4 MG: 4 TABLET ORAL at 22:13

## 2017-01-01 RX ADMIN — SODIUM CHLORIDE 1500 MG: 900 INJECTION, SOLUTION INTRAVENOUS at 02:46

## 2017-01-01 RX ADMIN — DEXAMETHASONE SODIUM PHOSPHATE 4 MG: 4 INJECTION, SOLUTION INTRAMUSCULAR; INTRAVENOUS at 17:31

## 2017-01-01 RX ADMIN — TAMSULOSIN HYDROCHLORIDE 0.4 MG: 0.4 CAPSULE ORAL at 08:21

## 2017-01-01 RX ADMIN — AZATHIOPRINE 50 MG: 50 TABLET ORAL at 11:12

## 2017-01-01 RX ADMIN — GADOBUTROL 2.5 ML: 604.72 INJECTION INTRAVENOUS at 21:12

## 2017-01-01 RX ADMIN — POLYETHYLENE GLYCOL 3350 17 G: 17 POWDER, FOR SOLUTION ORAL at 09:05

## 2017-01-01 RX ADMIN — DEXAMETHASONE 4 MG: 4 TABLET ORAL at 22:19

## 2017-01-01 RX ADMIN — DEXAMETHASONE 4 MG: 4 TABLET ORAL at 13:26

## 2017-01-01 RX ADMIN — CEFTRIAXONE 2 G: 2 INJECTION, POWDER, FOR SOLUTION INTRAMUSCULAR; INTRAVENOUS at 21:37

## 2017-01-01 RX ADMIN — BUPROPION HYDROCHLORIDE 300 MG: 150 TABLET, FILM COATED, EXTENDED RELEASE ORAL at 06:22

## 2017-01-01 RX ADMIN — DEXAMETHASONE 4 MG: 4 TABLET ORAL at 15:21

## 2017-01-01 RX ADMIN — CEFTRIAXONE 2 G: 2 INJECTION, POWDER, FOR SOLUTION INTRAMUSCULAR; INTRAVENOUS at 21:12

## 2017-01-01 RX ADMIN — GLYCOPYRROLATE 0.4 MG: 0.2 INJECTION INTRAMUSCULAR; INTRAVENOUS at 14:39

## 2017-01-01 RX ADMIN — FENTANYL CITRATE 50 MCG: 50 INJECTION, SOLUTION INTRAMUSCULAR; INTRAVENOUS at 13:07

## 2017-01-01 RX ADMIN — BUPROPION HYDROCHLORIDE 300 MG: 300 TABLET, EXTENDED RELEASE ORAL at 06:15

## 2017-01-01 RX ADMIN — CEFTRIAXONE 2 G: 2 INJECTION, POWDER, FOR SOLUTION INTRAMUSCULAR; INTRAVENOUS at 22:47

## 2017-01-01 RX ADMIN — CEFTRIAXONE 2 G: 2 INJECTION, POWDER, FOR SOLUTION INTRAMUSCULAR; INTRAVENOUS at 21:36

## 2017-01-01 RX ADMIN — DEXAMETHASONE SODIUM PHOSPHATE 6 MG: 4 INJECTION, SOLUTION INTRAMUSCULAR; INTRAVENOUS at 03:10

## 2017-01-01 RX ADMIN — DEXAMETHASONE SODIUM PHOSPHATE 4 MG: 4 INJECTION, SOLUTION INTRAMUSCULAR; INTRAVENOUS at 11:30

## 2017-01-01 RX ADMIN — SODIUM CHLORIDE 50 ML/HR: 900 INJECTION, SOLUTION INTRAVENOUS at 14:06

## 2017-01-01 RX ADMIN — CEFTRIAXONE 2 G: 2 INJECTION, POWDER, FOR SOLUTION INTRAMUSCULAR; INTRAVENOUS at 08:53

## 2017-01-01 RX ADMIN — PANTOPRAZOLE SODIUM 40 MG: 40 TABLET, DELAYED RELEASE ORAL at 10:31

## 2017-01-01 RX ADMIN — ONDANSETRON 4 MG: 2 INJECTION INTRAMUSCULAR; INTRAVENOUS at 14:35

## 2017-01-01 RX ADMIN — CEFTRIAXONE 2 G: 2 INJECTION, POWDER, FOR SOLUTION INTRAMUSCULAR; INTRAVENOUS at 11:10

## 2017-01-01 RX ADMIN — CEFTRIAXONE 2 G: 2 INJECTION, POWDER, FOR SOLUTION INTRAMUSCULAR; INTRAVENOUS at 22:13

## 2017-01-01 RX ADMIN — CEFTRIAXONE 2 G: 2 INJECTION, POWDER, FOR SOLUTION INTRAMUSCULAR; INTRAVENOUS at 10:23

## 2017-01-01 RX ADMIN — DEXAMETHASONE SODIUM PHOSPHATE 4 MG: 4 INJECTION, SOLUTION INTRAMUSCULAR; INTRAVENOUS at 01:37

## 2017-01-01 RX ADMIN — POLYETHYLENE GLYCOL 3350 17 G: 17 POWDER, FOR SOLUTION ORAL at 10:20

## 2017-01-01 RX ADMIN — BARIUM SULFATE 30 ML: 0.81 POWDER, FOR SUSPENSION ORAL at 11:08

## 2017-01-01 RX ADMIN — ACYCLOVIR SODIUM 798 MG: 50 INJECTION, SOLUTION INTRAVENOUS at 21:06

## 2017-01-01 RX ADMIN — CYANOCOBALAMIN 1000 MCG: 1000 INJECTION, SOLUTION INTRAMUSCULAR at 16:28

## 2017-01-01 RX ADMIN — ACETAMINOPHEN AND CODEINE PHOSPHATE 1 TABLET: 300; 30 TABLET ORAL at 09:45

## 2017-01-01 RX ADMIN — SODIUM CHLORIDE 150 ML/HR: 900 INJECTION, SOLUTION INTRAVENOUS at 02:27

## 2017-01-01 RX ADMIN — DEXAMETHASONE SODIUM PHOSPHATE 4 MG: 4 INJECTION, SOLUTION INTRAMUSCULAR; INTRAVENOUS at 11:07

## 2017-01-01 RX ADMIN — CEFTRIAXONE 2 G: 2 INJECTION, POWDER, FOR SOLUTION INTRAMUSCULAR; INTRAVENOUS at 22:03

## 2017-01-01 RX ADMIN — AZATHIOPRINE 50 MG: 50 TABLET ORAL at 08:57

## 2017-01-01 RX ADMIN — DEXAMETHASONE SODIUM PHOSPHATE 4 MG: 4 INJECTION, SOLUTION INTRAMUSCULAR; INTRAVENOUS at 18:44

## 2017-01-01 RX ADMIN — DEXAMETHASONE SODIUM PHOSPHATE 6 MG: 4 INJECTION, SOLUTION INTRAMUSCULAR; INTRAVENOUS at 19:26

## 2017-01-01 RX ADMIN — NYSTATIN 500000 UNITS: 100000 SUSPENSION ORAL at 18:44

## 2017-01-01 RX ADMIN — TAMSULOSIN HYDROCHLORIDE 0.4 MG: 0.4 CAPSULE ORAL at 10:22

## 2017-01-01 RX ADMIN — DEXAMETHASONE 4 MG: 4 TABLET ORAL at 22:18

## 2017-01-01 RX ADMIN — CEFAZOLIN SODIUM 2 G: 2 SOLUTION INTRAVENOUS at 11:21

## 2017-01-01 RX ADMIN — DEXAMETHASONE 4 MG: 4 TABLET ORAL at 13:09

## 2017-01-01 RX ADMIN — TAMSULOSIN HYDROCHLORIDE 0.4 MG: 0.4 CAPSULE ORAL at 10:04

## 2017-01-01 RX ADMIN — DEXAMETHASONE SODIUM PHOSPHATE 6 MG: 4 INJECTION, SOLUTION INTRAMUSCULAR; INTRAVENOUS at 08:28

## 2017-01-01 RX ADMIN — NYSTATIN 500000 UNITS: 100000 SUSPENSION ORAL at 21:35

## 2017-01-01 RX ADMIN — CEFTRIAXONE 2 G: 2 INJECTION, POWDER, FOR SOLUTION INTRAMUSCULAR; INTRAVENOUS at 15:21

## 2017-01-01 RX ADMIN — DEXAMETHASONE SODIUM PHOSPHATE 4 MG: 4 INJECTION, SOLUTION INTRAMUSCULAR; INTRAVENOUS at 06:15

## 2017-01-01 RX ADMIN — SODIUM CHLORIDE 1500 MG: 900 INJECTION, SOLUTION INTRAVENOUS at 18:03

## 2017-01-01 RX ADMIN — FAMOTIDINE 20 MG: 10 INJECTION, SOLUTION INTRAVENOUS at 23:05

## 2017-01-01 RX ADMIN — ENOXAPARIN SODIUM 40 MG: 40 INJECTION SUBCUTANEOUS at 01:37

## 2017-01-01 RX ADMIN — CEFTRIAXONE 2 G: 2 INJECTION, POWDER, FOR SOLUTION INTRAMUSCULAR; INTRAVENOUS at 09:07

## 2017-01-01 RX ADMIN — DEXAMETHASONE SODIUM PHOSPHATE 4 MG: 4 INJECTION, SOLUTION INTRAMUSCULAR; INTRAVENOUS at 06:06

## 2017-01-01 RX ADMIN — SODIUM CHLORIDE 50 ML/HR: 900 INJECTION, SOLUTION INTRAVENOUS at 21:13

## 2017-01-01 RX ADMIN — TAMSULOSIN HYDROCHLORIDE 0.4 MG: 0.4 CAPSULE ORAL at 10:18

## 2017-01-01 RX ADMIN — TAMSULOSIN HYDROCHLORIDE 0.4 MG: 0.4 CAPSULE ORAL at 09:05

## 2017-01-01 RX ADMIN — DEXAMETHASONE SODIUM PHOSPHATE 10 MG: 4 INJECTION, SOLUTION INTRA-ARTICULAR; INTRALESIONAL; INTRAMUSCULAR; INTRAVENOUS; SOFT TISSUE at 13:07

## 2017-01-01 RX ADMIN — AZATHIOPRINE 50 MG: 50 TABLET ORAL at 09:33

## 2017-01-01 RX ADMIN — FENTANYL CITRATE 150 MCG: 50 INJECTION, SOLUTION INTRAMUSCULAR; INTRAVENOUS at 12:58

## 2017-01-01 RX ADMIN — TAMSULOSIN HYDROCHLORIDE 0.4 MG: 0.4 CAPSULE ORAL at 08:58

## 2017-01-01 RX ADMIN — DEXAMETHASONE 4 MG: 4 TABLET ORAL at 21:35

## 2017-01-01 RX ADMIN — ACETAMINOPHEN AND CODEINE PHOSPHATE 1 TABLET: 300; 30 TABLET ORAL at 13:32

## 2017-01-01 RX ADMIN — NYSTATIN 500000 UNITS: 100000 SUSPENSION ORAL at 21:29

## 2017-01-01 RX ADMIN — SODIUM CHLORIDE 1500 MG: 900 INJECTION, SOLUTION INTRAVENOUS at 13:37

## 2017-01-01 RX ADMIN — DEXAMETHASONE SODIUM PHOSPHATE 4 MG: 4 INJECTION, SOLUTION INTRAMUSCULAR; INTRAVENOUS at 21:29

## 2017-01-01 RX ADMIN — NYSTATIN 500000 UNITS: 100000 SUSPENSION ORAL at 22:50

## 2017-01-01 RX ADMIN — SODIUM CHLORIDE 100 ML/HR: 900 INJECTION, SOLUTION INTRAVENOUS at 16:27

## 2017-01-01 RX ADMIN — DEXAMETHASONE SODIUM PHOSPHATE 6 MG: 4 INJECTION, SOLUTION INTRAMUSCULAR; INTRAVENOUS at 22:18

## 2017-01-01 RX ADMIN — ACYCLOVIR SODIUM 798 MG: 50 INJECTION, SOLUTION INTRAVENOUS at 12:01

## 2017-01-01 RX ADMIN — SODIUM CHLORIDE 1000 ML: 900 INJECTION, SOLUTION INTRAVENOUS at 17:20

## 2017-01-01 RX ADMIN — NYSTATIN 500000 UNITS: 100000 SUSPENSION ORAL at 14:28

## 2017-01-01 RX ADMIN — SODIUM CHLORIDE 100 ML/HR: 900 INJECTION, SOLUTION INTRAVENOUS at 06:07

## 2017-01-01 RX ADMIN — DEXAMETHASONE SODIUM PHOSPHATE 6 MG: 4 INJECTION, SOLUTION INTRAMUSCULAR; INTRAVENOUS at 08:57

## 2017-01-01 RX ADMIN — DEXAMETHASONE SODIUM PHOSPHATE 6 MG: 4 INJECTION, SOLUTION INTRAMUSCULAR; INTRAVENOUS at 10:52

## 2017-01-01 RX ADMIN — Medication 20 ML: at 17:49

## 2017-01-01 RX ADMIN — Medication 10 ML: at 06:35

## 2017-01-01 RX ADMIN — AZATHIOPRINE 50 MG: 50 TABLET ORAL at 10:31

## 2017-01-01 RX ADMIN — DEXAMETHASONE 4 MG: 4 TABLET ORAL at 17:41

## 2017-01-01 RX ADMIN — CEFTRIAXONE 2 G: 2 INJECTION, POWDER, FOR SOLUTION INTRAMUSCULAR; INTRAVENOUS at 10:25

## 2017-01-01 RX ADMIN — DEXAMETHASONE 4 MG: 4 TABLET ORAL at 01:28

## 2017-01-01 RX ADMIN — Medication 10 ML: at 05:20

## 2017-01-01 RX ADMIN — NYSTATIN 500000 UNITS: 100000 SUSPENSION ORAL at 21:47

## 2017-01-01 RX ADMIN — TAMSULOSIN HYDROCHLORIDE 0.4 MG: 0.4 CAPSULE ORAL at 11:10

## 2017-01-01 RX ADMIN — DOCUSATE SODIUM AND SENNOSIDES 2 TABLET: 8.6; 5 TABLET, FILM COATED ORAL at 10:17

## 2017-01-01 RX ADMIN — CEFTRIAXONE 2 G: 2 INJECTION, POWDER, FOR SOLUTION INTRAMUSCULAR; INTRAVENOUS at 21:52

## 2017-01-01 RX ADMIN — ACETAMINOPHEN 650 MG: 325 TABLET, FILM COATED ORAL at 16:32

## 2017-01-01 RX ADMIN — NYSTATIN 500000 UNITS: 100000 SUSPENSION ORAL at 17:11

## 2017-01-01 RX ADMIN — CEFTRIAXONE 2 G: 2 INJECTION, POWDER, FOR SOLUTION INTRAMUSCULAR; INTRAVENOUS at 09:27

## 2017-01-01 RX ADMIN — CEFTRIAXONE 2 G: 2 INJECTION, POWDER, FOR SOLUTION INTRAMUSCULAR; INTRAVENOUS at 22:36

## 2017-01-01 RX ADMIN — ACYCLOVIR SODIUM 798 MG: 50 INJECTION, SOLUTION INTRAVENOUS at 06:05

## 2017-01-01 RX ADMIN — NYSTATIN 500000 UNITS: 100000 SUSPENSION ORAL at 14:05

## 2017-01-01 RX ADMIN — CEFTRIAXONE 2 G: 2 INJECTION, POWDER, FOR SOLUTION INTRAMUSCULAR; INTRAVENOUS at 11:07

## 2017-01-01 RX ADMIN — DEXAMETHASONE SODIUM PHOSPHATE 6 MG: 4 INJECTION, SOLUTION INTRAMUSCULAR; INTRAVENOUS at 13:56

## 2017-01-01 RX ADMIN — SODIUM CHLORIDE 1000 ML: 900 INJECTION, SOLUTION INTRAVENOUS at 13:30

## 2017-01-01 RX ADMIN — NYSTATIN 500000 UNITS: 100000 SUSPENSION ORAL at 18:54

## 2017-01-01 RX ADMIN — DEXAMETHASONE 4 MG: 4 TABLET ORAL at 22:00

## 2017-01-01 RX ADMIN — SODIUM CHLORIDE 40 MG: 9 INJECTION INTRAMUSCULAR; INTRAVENOUS; SUBCUTANEOUS at 08:20

## 2017-01-01 RX ADMIN — Medication 20 ML: at 17:04

## 2017-01-01 RX ADMIN — DEXAMETHASONE SODIUM PHOSPHATE 4 MG: 4 INJECTION, SOLUTION INTRAMUSCULAR; INTRAVENOUS at 12:42

## 2017-01-01 RX ADMIN — CEFAZOLIN SODIUM 2 G: 2 SOLUTION INTRAVENOUS at 13:05

## 2017-01-01 RX ADMIN — DEXAMETHASONE SODIUM PHOSPHATE 4 MG: 4 INJECTION, SOLUTION INTRAMUSCULAR; INTRAVENOUS at 06:38

## 2017-01-01 RX ADMIN — NYSTATIN 500000 UNITS: 100000 SUSPENSION ORAL at 22:21

## 2017-01-01 RX ADMIN — DEXAMETHASONE 4 MG: 4 TABLET ORAL at 17:46

## 2017-01-01 RX ADMIN — AZATHIOPRINE 50 MG: 50 TABLET ORAL at 09:47

## 2017-01-01 RX ADMIN — DEXAMETHASONE SODIUM PHOSPHATE 4 MG: 4 INJECTION, SOLUTION INTRAMUSCULAR; INTRAVENOUS at 06:01

## 2017-01-01 RX ADMIN — ENOXAPARIN SODIUM 40 MG: 40 INJECTION SUBCUTANEOUS at 02:30

## 2017-01-01 RX ADMIN — SODIUM CHLORIDE 1500 MG: 900 INJECTION, SOLUTION INTRAVENOUS at 11:30

## 2017-01-01 RX ADMIN — BUPROPION HYDROCHLORIDE 300 MG: 150 TABLET, FILM COATED, EXTENDED RELEASE ORAL at 06:16

## 2017-01-01 RX ADMIN — SODIUM CHLORIDE 1500 MG: 900 INJECTION, SOLUTION INTRAVENOUS at 20:00

## 2017-01-01 RX ADMIN — NYSTATIN 500000 UNITS: 100000 SUSPENSION ORAL at 18:35

## 2017-01-01 RX ADMIN — SODIUM CHLORIDE 150 ML/HR: 900 INJECTION, SOLUTION INTRAVENOUS at 12:00

## 2017-01-01 RX ADMIN — SODIUM CHLORIDE 1500 MG: 900 INJECTION, SOLUTION INTRAVENOUS at 02:09

## 2017-01-01 RX ADMIN — CEFTRIAXONE 2 G: 2 INJECTION, POWDER, FOR SOLUTION INTRAMUSCULAR; INTRAVENOUS at 10:31

## 2017-01-01 RX ADMIN — LIDOCAINE HYDROCHLORIDE 2 ML: 10 INJECTION, SOLUTION INFILTRATION; PERINEURAL at 14:20

## 2017-01-01 RX ADMIN — TAMSULOSIN HYDROCHLORIDE 0.4 MG: 0.4 CAPSULE ORAL at 14:16

## 2017-01-01 RX ADMIN — Medication 3 MG: at 14:39

## 2017-01-01 RX ADMIN — BARIUM SULFATE 15 ML: 400 PASTE ORAL at 11:07

## 2017-01-01 RX ADMIN — NYSTATIN 500000 UNITS: 100000 SUSPENSION ORAL at 09:04

## 2017-01-01 RX ADMIN — SODIUM CHLORIDE 50 ML/HR: 900 INJECTION, SOLUTION INTRAVENOUS at 10:53

## 2017-01-01 RX ADMIN — PROPOFOL 50 MG: 10 INJECTION, EMULSION INTRAVENOUS at 13:07

## 2017-01-01 RX ADMIN — BUPROPION HYDROCHLORIDE 300 MG: 300 TABLET, EXTENDED RELEASE ORAL at 08:34

## 2017-01-01 RX ADMIN — DEXAMETHASONE 4 MG: 4 TABLET ORAL at 13:45

## 2017-01-01 RX ADMIN — Medication 10 ML: at 13:58

## 2017-01-01 RX ADMIN — Medication 10 ML: at 21:56

## 2017-01-01 RX ADMIN — CEFTRIAXONE 2 G: 2 INJECTION, POWDER, FOR SOLUTION INTRAMUSCULAR; INTRAVENOUS at 21:53

## 2017-01-01 RX ADMIN — ACETAMINOPHEN AND CODEINE PHOSPHATE 1 TABLET: 300; 30 TABLET ORAL at 18:35

## 2017-01-01 RX ADMIN — DEXAMETHASONE SODIUM PHOSPHATE 4 MG: 4 INJECTION, SOLUTION INTRAMUSCULAR; INTRAVENOUS at 11:51

## 2017-01-01 RX ADMIN — SODIUM CHLORIDE 1500 MG: 900 INJECTION, SOLUTION INTRAVENOUS at 12:36

## 2017-01-01 RX ADMIN — DEXAMETHASONE SODIUM PHOSPHATE 4 MG: 4 INJECTION, SOLUTION INTRAMUSCULAR; INTRAVENOUS at 11:43

## 2017-01-01 RX ADMIN — GADOBUTROL 7.5 ML: 604.72 INJECTION INTRAVENOUS at 19:25

## 2017-01-01 RX ADMIN — BUPROPION HYDROCHLORIDE 300 MG: 300 TABLET, EXTENDED RELEASE ORAL at 06:07

## 2017-01-01 RX ADMIN — CEFTRIAXONE 2 G: 2 INJECTION, POWDER, FOR SOLUTION INTRAMUSCULAR; INTRAVENOUS at 09:39

## 2017-01-01 RX ADMIN — CYANOCOBALAMIN 1000 MCG: 1000 INJECTION, SOLUTION INTRAMUSCULAR at 17:02

## 2017-01-01 RX ADMIN — GADOBUTROL 7.5 ML: 604.72 INJECTION INTRAVENOUS at 15:24

## 2017-01-01 RX ADMIN — DEXAMETHASONE 4 MG: 4 TABLET ORAL at 08:58

## 2017-01-01 RX ADMIN — PANTOPRAZOLE SODIUM 40 MG: 40 TABLET, DELAYED RELEASE ORAL at 08:52

## 2017-01-01 RX ADMIN — CEFTRIAXONE 2 G: 2 INJECTION, POWDER, FOR SOLUTION INTRAMUSCULAR; INTRAVENOUS at 21:25

## 2017-01-01 RX ADMIN — PROPOFOL 150 MG: 10 INJECTION, EMULSION INTRAVENOUS at 13:00

## 2017-01-01 RX ADMIN — BUPROPION HYDROCHLORIDE 300 MG: 300 TABLET, EXTENDED RELEASE ORAL at 06:58

## 2017-01-01 RX ADMIN — DEXAMETHASONE 4 MG: 4 TABLET ORAL at 10:22

## 2017-01-01 RX ADMIN — DEXAMETHASONE SODIUM PHOSPHATE 4 MG: 4 INJECTION, SOLUTION INTRAMUSCULAR; INTRAVENOUS at 05:44

## 2017-01-01 RX ADMIN — MIDAZOLAM HYDROCHLORIDE 2 MG: 1 INJECTION, SOLUTION INTRAMUSCULAR; INTRAVENOUS at 12:52

## 2017-01-01 RX ADMIN — TAMSULOSIN HYDROCHLORIDE 0.4 MG: 0.4 CAPSULE ORAL at 10:31

## 2017-01-01 RX ADMIN — DEXAMETHASONE 4 MG: 4 TABLET ORAL at 20:40

## 2017-01-01 RX ADMIN — NYSTATIN 500000 UNITS: 100000 SUSPENSION ORAL at 22:33

## 2017-01-01 RX ADMIN — CYANOCOBALAMIN 1000 MCG: 1000 INJECTION, SOLUTION INTRAMUSCULAR at 23:45

## 2017-01-01 RX ADMIN — ENOXAPARIN SODIUM 40 MG: 40 INJECTION SUBCUTANEOUS at 02:26

## 2017-01-01 RX ADMIN — LIDOCAINE HYDROCHLORIDE 1 ML: 10 INJECTION, SOLUTION EPIDURAL; INFILTRATION; INTRACAUDAL; PERINEURAL at 12:14

## 2017-01-01 RX ADMIN — ACETAMINOPHEN AND CODEINE PHOSPHATE 1 TABLET: 300; 30 TABLET ORAL at 20:03

## 2017-01-01 RX ADMIN — NYSTATIN 500000 UNITS: 100000 SUSPENSION ORAL at 14:53

## 2017-01-01 RX ADMIN — DEXAMETHASONE 4 MG: 4 TABLET ORAL at 09:00

## 2017-01-01 RX ADMIN — ROCURONIUM BROMIDE 10 MG: 10 INJECTION, SOLUTION INTRAVENOUS at 14:02

## 2017-01-01 RX ADMIN — NYSTATIN 500000 UNITS: 100000 SUSPENSION ORAL at 08:57

## 2017-01-01 RX ADMIN — DIPHENHYDRAMINE HYDROCHLORIDE 25 MG: 50 INJECTION, SOLUTION INTRAMUSCULAR; INTRAVENOUS at 23:05

## 2017-01-01 RX ADMIN — Medication 10 ML: at 21:05

## 2017-01-01 RX ADMIN — SODIUM CHLORIDE 150 ML/HR: 900 INJECTION, SOLUTION INTRAVENOUS at 16:08

## 2017-01-01 RX ADMIN — CEFTRIAXONE 2 G: 2 INJECTION, POWDER, FOR SOLUTION INTRAMUSCULAR; INTRAVENOUS at 22:04

## 2017-01-01 RX ADMIN — CEFTRIAXONE 2 G: 2 INJECTION, POWDER, FOR SOLUTION INTRAMUSCULAR; INTRAVENOUS at 22:49

## 2017-01-01 RX ADMIN — DEXAMETHASONE 4 MG: 4 TABLET ORAL at 13:43

## 2017-01-01 RX ADMIN — DEXAMETHASONE SODIUM PHOSPHATE 4 MG: 4 INJECTION, SOLUTION INTRAMUSCULAR; INTRAVENOUS at 00:21

## 2017-01-01 RX ADMIN — BUPROPION HYDROCHLORIDE 300 MG: 150 TABLET, FILM COATED, EXTENDED RELEASE ORAL at 06:39

## 2017-01-01 RX ADMIN — NYSTATIN 500000 UNITS: 100000 SUSPENSION ORAL at 12:43

## 2017-01-01 RX ADMIN — BUPROPION HYDROCHLORIDE 300 MG: 150 TABLET, FILM COATED, EXTENDED RELEASE ORAL at 06:25

## 2017-01-01 RX ADMIN — SODIUM CHLORIDE 50 ML/HR: 900 INJECTION, SOLUTION INTRAVENOUS at 11:15

## 2017-01-01 RX ADMIN — DEXAMETHASONE 4 MG: 4 TABLET ORAL at 21:26

## 2017-01-01 RX ADMIN — PHYTONADIONE 10 MG: 5 TABLET ORAL at 20:39

## 2017-01-01 RX ADMIN — TAMSULOSIN HYDROCHLORIDE 0.4 MG: 0.4 CAPSULE ORAL at 09:47

## 2017-01-01 RX ADMIN — SODIUM CHLORIDE 50 ML/HR: 900 INJECTION, SOLUTION INTRAVENOUS at 09:45

## 2017-01-01 RX ADMIN — PANTOPRAZOLE SODIUM 40 MG: 40 TABLET, DELAYED RELEASE ORAL at 21:04

## 2017-01-01 RX ADMIN — FENTANYL CITRATE 50 MCG: 50 INJECTION, SOLUTION INTRAMUSCULAR; INTRAVENOUS at 14:08

## 2017-01-01 RX ADMIN — SODIUM CHLORIDE 50 ML/HR: 900 INJECTION, SOLUTION INTRAVENOUS at 06:40

## 2017-01-01 RX ADMIN — ROCURONIUM BROMIDE 50 MG: 10 INJECTION, SOLUTION INTRAVENOUS at 13:01

## 2017-01-01 RX ADMIN — DEXAMETHASONE SODIUM PHOSPHATE 6 MG: 4 INJECTION, SOLUTION INTRAMUSCULAR; INTRAVENOUS at 20:00

## 2017-01-01 RX ADMIN — DEXAMETHASONE 4 MG: 4 TABLET ORAL at 18:55

## 2017-01-01 RX ADMIN — CEFTRIAXONE 2 G: 2 INJECTION, POWDER, FOR SOLUTION INTRAMUSCULAR; INTRAVENOUS at 10:04

## 2017-01-01 RX ADMIN — DEXAMETHASONE SODIUM PHOSPHATE 4 MG: 4 INJECTION, SOLUTION INTRAMUSCULAR; INTRAVENOUS at 23:41

## 2017-01-01 RX ADMIN — CEFTRIAXONE 2 G: 2 INJECTION, POWDER, FOR SOLUTION INTRAMUSCULAR; INTRAVENOUS at 22:02

## 2017-01-01 RX ADMIN — DEXAMETHASONE SODIUM PHOSPHATE 4 MG: 4 INJECTION, SOLUTION INTRAMUSCULAR; INTRAVENOUS at 05:41

## 2017-01-01 RX ADMIN — AZATHIOPRINE 50 MG: 50 TABLET ORAL at 14:05

## 2017-01-01 RX ADMIN — NYSTATIN 500000 UNITS: 100000 SUSPENSION ORAL at 18:15

## 2017-01-01 RX ADMIN — AZATHIOPRINE 50 MG: 50 TABLET ORAL at 09:03

## 2017-01-01 RX ADMIN — SODIUM CHLORIDE 100 ML/HR: 900 INJECTION, SOLUTION INTRAVENOUS at 05:44

## 2017-01-01 RX ADMIN — TAMSULOSIN HYDROCHLORIDE 0.4 MG: 0.4 CAPSULE ORAL at 10:23

## 2017-01-16 NOTE — MR AVS SNAPSHOT
Visit Information Date & Time Provider Department Dept. Phone Encounter #  
 1/16/2017  9:00 AM Harshad Chris  [facility] [address] [address] [id number] Follow-up Instructions Return in about 6 weeks (around 2/27/2017). Your Appointments 2/2/2017 10:40 AM  
Follow Up with Megha Yang MD  
[facility] [address]) Appt Note: 5wk f/u from Botox injections [address] [facility] [address]  
[phone]  
  
   
 [place] [address]  
  
    
 2/27/2017  9:00 AM  
Follow Up with Harshad Chris MD  
[facility] [address]) Appt Note: 6wk f/u  
 [address] [facility] [place] [address] [facility] [address]  
[phone]  
  
   
 [place] [address]  
  
    
 11/20/2017  2:30 PM  
Office Visit with Alethea Moreno MD  
[address] [place] [address]) Appt Note: Return in about 1 year (around 11/17/2017) for medicare wellness exam.  
 [address] [facility] [address] [facility] 83 222 [facility]  
  
   
 [address] [facility] [address] 951 Upcoming Health Maintenance Date Due DTaP/Tdap/Td series (1 - Tdap) 9/13/2000 Allergies as of 1/16/2017  Review Complete On: 1/16/2017 By: Harshad Chris MD  
 No Known Allergies Current Immunizations  Reviewed on 11/17/2016 Name Date Influenza Vaccine 10/12/2016 Pneumococcal Polysaccharide (PPSV-23) 11/17/2016  3:27 PM  
  
 Not reviewed this visit You Were Diagnosed With   
  
 Codes Comments Spastic hemiplegia ([facility]    -  Primary ICD-10-CM: G81.10 ICD-9-CM: 342.10 Left hemiparesis ([facility]     ICD-10-CM: G81.94 
ICD-9-CM: 342.90 Neurosarcoidosis ([facility]     ICD-10-CM: E83.73 
ICD-9-CM: 146 Contracture, left hand     ICD-10-CM: M24.542 ICD-9-CM: 718.44 Vitals BP Pulse Temp Height(growth percentile) Weight(growth percentile) SpO2  
 120/68 62 98.5 °F (36.9 °C) (Oral) 6' 1\" (1.854 m) 180 lb (81.6 kg) 98% BMI Smoking Status 23.75 kg/m2 Current Some Day Smoker BMI and BSA Data Body Mass Index Body Surface Area  
 23.75 kg/m 2 2.05 m 2 Preferred Pharmacy Pharmacy Name Phone [facility] [address] [phone] Your Updated Medication List  
  
   
This list is accurate as of: 1/16/17 10:47 AM.  Always use your most recent med list.  
  
  
  
  
 azaTHIOprine 50 mg tablet Commonly known as:  The Pepsi Take 50 mg by mouth three (3) times daily. buPROPion  mg tablet Commonly known as:  Mar Bannister Take 150 mg by mouth every morning. Follow-up Instructions Return in about 6 weeks (around 2/27/2017). Introducing [facility] & HEALTH SERVICES! Dear Ronna Garcia: Thank you for requesting a [employer] account. Our records indicate that you already have an active [employer] account. You can access your account anytime at [email or web address] Did you know that you can access your hospital and ER discharge instructions at any time in [employer]? You can also review all of your test results from your hospital stay or ER visit. Additional Information If you have questions, please visit the Frequently Asked Questions section of the [employer] website at [email or web address] Remember, [employer] is NOT to be used for urgent needs. For medical emergencies, dial 911. Now available from your iPhone and Android! Please provide this summary of care documentation to your next provider. Your primary care clinician is listed as Rosi Bonner. If you have any questions after today's visit, please call [phone].

## 2017-01-16 NOTE — PROGRESS NOTES
[facility]             [address] [place] [address] Dr Thomson, [address]    1/16/2017    HPI:  Harley Hanson is a 39 y.o., right handed,   male, who presents with left hand pain. Patient is accompanied by mother who provides much of the history. Old records did not accompany the patient according to mother he had bacterial meningitis in 2011 he had a hip replacement 814 due to a fall and fracture which is attributed to use of steroids with the meningitis. Subsequently he developed stroke with left-sided weakness he fell again and broke left arm following casting he developed contractures of the left hand he describes the hand pain as achy. Mother reports that the Neurontin seem to help the pain but made him walk drunken is somewhat drowsy. She has not continued the medicine. She states that the Dr. Paige Antonio and Akash Kwon diagnosed neurosarcoidosis. She he has been followed by neurology at [place] [address] is not taking his medications including antidepressants he denies depression but his mother feels that he is but he does not like to take medicine he does smoke a pack half pack per day  Patient denies any pain or significant improvement following the first Botox injection he is due for reassessment in a month by Dr. Alessandro Wen he has undergone neuropsychological testing but this was only a week ago. Results are not available for review records from [place] [address] are also not available. Mother wants a repeat MRI brain Patient only tried one dose neurontin but denies pain  Current Outpatient Prescriptions   Medication Sig Dispense Refill    buPROPion XL (WELLBUTRIN XL) 150 mg tablet Take 150 mg by mouth every morning.  azaTHIOprine (IMURAN) 50 mg tablet Take 50 mg by mouth three (3) times daily.          Past Medical History   Diagnosis Date    Arm pain     CVA (cerebrovascular accident) ([facility] 2012    Meningitis     Meningitis     [facility]       Past Surgical History   Procedure Laterality Date    Hx hip replacement  2010    Hx wisdom teeth extraction       No family history on file. No Known Allergies    Review of Systems:   Review of Systems - History obtained from mother and the patient  General ROS: negative  Psychological ROS: positive for - depression  ENT ROS: negative  Hematological and Lymphatic ROS: negative  Endocrine ROS: negative  Respiratory ROS: no cough, shortness of breath, or wheezing  Cardiovascular ROS: no chest pain or dyspnea on exertion  Gastrointestinal ROS: no abdominal pain, change in bowel habits, or black or bloody stools  Genito-Urinary ROS: no dysuria, trouble voiding, or hematuria  Musculoskeletal ROS: positive for - gait disturbance, muscular weakness and pain in arm - left and hand - left  Neurological ROS: positive for - impaired coordination/balance and weakness  Dermatological ROS: negative    PHYSICAL EXAMINATION:    Visit Vitals    /68    Pulse 62    Temp 98.5 °F (36.9 °C) (Oral)    Ht 6' 1\" (1.854 m)    Wt 81.6 kg (180 lb)    SpO2 98%    BMI 23.75 kg/m2     General:  Well defined, nourished, and groomed individual in no acute distress. Neck: Supple, nontender, thyroid within normal limits, no JVD, no bruits, no pain with resistance to active range of motion. Heart: Regular rate and rhythm, no murmurs, rub, or gallop. Normal S1S2. Lungs:  Clear to auscultation bilaterally with equal chest expansion, no cough, no wheeze  Musculoskeletal:  Extremities revealed no edema and had full range of motion of joints except left hand. NEUROLOGICAL EXAMINATION:     Mental Status:   Alert and oriented to person, place,not time unable to assess memory  Attention span and concentration are normal. Speech is sparse with a fair fund of knowledge. Patient appears abulic     Cranial Nerves:    II, III, IV, VI:  Visual acuity grossly intact.  Visual fields are normal.    Pupils are equal, round, and reactive to light and accommodation. Extra-ocular movements are full and fluid. , no ptosis or nystagmus. V-XII: Hearing is grossly intact. Facial features are symmetric,  Motor Examination: Normal tone, bulk, and strength, 5/5 muscle strength throughout right side  Consistent with effort  On right and lle decreased lue . No cogwheel rigidity or clonus present. Contracture left hand unable to extend fingers left hand    Coordination:  Heel-to-shin slow bilaterally. Finger to nose and rapid arm movement testing was better done on right . No resting or intention tremor    Gait and Station:left hemiparetic gait    Left pronator drift. Left hand atrophy      Assessment and Plan:   Cesilia Flores. is a 39 y.o. right handed male whose history and physical are consistent with left hand pain secondary to spasticity from cva . Cesilia Flores. who has risk factors including  Left hemiparesis secondary to cva/ neuroscarcoidosis. Rl Celaya was seen today for neurologic problem. Diagnoses and all orders for this visit:    Spastic hemiplegia ([address]    Left hemiparesis ([address]    Neurosarcoidosis ([address]    Contracture, left hand      Follow-up Disposition:  Return in about 6 weeks (around 2/27/2017). Reviewed with patient and mother recommendationsReviewed notes in chart ,need for records from [facility]  Will repeat mri brain when previous one available for review I spent 40  minutes with the patient in face-to-face consultation, of which greater than 50% was spent in counseling and coordination of care as described above.

## 2017-02-02 NOTE — PROGRESS NOTES
Re:  Marquise Ross JrMary,Follow up visit     2/2/2017 11:16 AM    SSN: [id number]    Subjective:   Govind Ramirez returns with his mother for follow up after I injected the left arm for spasticity after a stroke which occurred back in 2014. There doesn't seem to be much change after the injection. He still has significant spasticity and flexion of the left hand. Medications:    Current Outpatient Prescriptions   Medication Sig Dispense Refill    azaTHIOprine (IMURAN) 50 mg tablet Take 50 mg by mouth three (3) times daily.  buPROPion XL (WELLBUTRIN XL) 150 mg tablet Take 150 mg by mouth every morning. Vital signs:    Visit Vitals    /76 (BP 1 Location: Left arm, BP Patient Position: Sitting)    Pulse 79    Temp 98.6 °F (37 °C) (Oral)    Resp 14    Ht 6' 1\" (1.854 m)    Wt 80.9 kg (178 lb 6.4 oz)    SpO2 99%    BMI 23.54 kg/m2       Review of Systems:   As above otherwise 11 point review of systems negative including;   Constitutional no fever or chills  Skin denies rash or itching  HEENT  Denies tinnitus, hearing lose  Eyes denies diplopia vision lose  Respiratory denies sortness of breath  Cardiovascular denies chest pain, dyspnea on exertion  Gastrointestinal denies nausea, vomiting, diarrhea, constipation  Genitourinary denies incontinence  Musculoskeletal denies joint pain or swelling  Endocrine denies weight change  Hematology denies easy bruising or bleeding   Neurological as above in HPI      Patient Active Problem List   Diagnosis Code    Neurosarcoidosis ([facility]) D86.89    [facility] G81.94    Advance care planning Z71.89         Objective: The patient is awake, alert, and oriented x 4. Fund of knowledge is adequate. Speech is fluent and memory is intact. Cranial Nerves: II - Visual fields are full to confrontation. III, IV, VI - Extraocular movements are intact. There is no nystagmus. V - Facial sensation is intact to pinprick.   VII - Face is symmetrical.  VIII - Hearing is present. IX, X, XII - Palate is symmetrical.   XI - Shoulder shrugging and head turning intact  Motor: The patient has significant spasticity of the left hand. He can't open the hand passively, especially the middle finger stays significantly flexed. Overall strength is no better than 2/5 distally, 4/5 more proximally. The leg is also weak at about 4/5 throughout. Reflexes are 2+ and symmetrical. Plantars are down going. Gait is left hemiparetic. CBC: No results found for: WBC, RBC, HGB, HCT, PLT, HGBEXT, HCTEXT, PLTEXT  BMP: No results found for: GLU, NA, K, CL, CO2, BUN, CREA, CA  CMP: No results found for: GLU, NA, K, CL, CO2, BUN, CREA, CA, AGAP, BUCR, TBIL, GPT, AP, TP, ALB, GLOB, AGRAT  Coagulation: No results found for: PTP, INR, APTT, PTTT  Cardiac markers: No results found for: CPK, CKND1, BRAYDON    Assessment:  Severe spasticity after stroke leaving him with significant flexion deformities of the left hand. Plan: Will increase the amount of Botox at the nest injection and use EMG guidance. return here after the next injection. Sincerely,        Yimi Thrasher.  Hortencia Patel M.D.

## 2017-02-02 NOTE — COMMUNICATION BODY
Re:  Gary Dvual Jr.,Follow up visit     2/2/2017 11:16 AM    SSN: [id number]    Subjective:   Ibrahima Murray. returns with his mother for follow up after I injected the left arm for spasticity after a stroke which occurred back in 2014. There doesn't seem to be much change after the injection. He still has significant spasticity and flexion of the left hand. Medications:    Current Outpatient Prescriptions   Medication Sig Dispense Refill    azaTHIOprine (IMURAN) 50 mg tablet Take 50 mg by mouth three (3) times daily.  buPROPion XL (WELLBUTRIN XL) 150 mg tablet Take 150 mg by mouth every morning. Vital signs:    Visit Vitals    /76 (BP 1 Location: Left arm, BP Patient Position: Sitting)    Pulse 79    Temp 98.6 °F (37 °C) (Oral)    Resp 14    Ht 6' 1\" (1.854 m)    Wt 80.9 kg (178 lb 6.4 oz)    SpO2 99%    BMI 23.54 kg/m2       Review of Systems:   As above otherwise 11 point review of systems negative including;   Constitutional no fever or chills  Skin denies rash or itching  HEENT  Denies tinnitus, hearing lose  Eyes denies diplopia vision lose  Respiratory denies sortness of breath  Cardiovascular denies chest pain, dyspnea on exertion  Gastrointestinal denies nausea, vomiting, diarrhea, constipation  Genitourinary denies incontinence  Musculoskeletal denies joint pain or swelling  Endocrine denies weight change  Hematology denies easy bruising or bleeding   Neurological as above in HPI      Patient Active Problem List   Diagnosis Code    Neurosarcoidosis ([facility]) D86.89    [facility] G81.94    Advance care planning Z71.89         Objective: The patient is awake, alert, and oriented x 4. Fund of knowledge is adequate. Speech is fluent and memory is intact. Cranial Nerves: II - Visual fields are full to confrontation. III, IV, VI - Extraocular movements are intact. There is no nystagmus. V - Facial sensation is intact to pinprick.   VII - Face is symmetrical.  VIII - Hearing is present. IX, X, XII - Palate is symmetrical.   XI - Shoulder shrugging and head turning intact  Motor: The patient has significant spasticity of the left hand. He can't open the hand passively, especially the middle finger stays significantly flexed. Overall strength is no better than 2/5 distally, 4/5 more proximally. The leg is also weak at about 4/5 throughout. Reflexes are 2+ and symmetrical. Plantars are down going. Gait is left hemiparetic. CBC: No results found for: WBC, RBC, HGB, HCT, PLT, HGBEXT, HCTEXT, PLTEXT  BMP: No results found for: GLU, NA, K, CL, CO2, BUN, CREA, CA  CMP: No results found for: GLU, NA, K, CL, CO2, BUN, CREA, CA, AGAP, BUCR, TBIL, GPT, AP, TP, ALB, GLOB, AGRAT  Coagulation: No results found for: PTP, INR, APTT, PTTT  Cardiac markers: No results found for: CPK, CKND1, BRAYDON    Assessment:  Severe spasticity after stroke leaving him with significant flexion deformities of the left hand. Plan: Will increase the amount of Botox at the nest injection and use EMG guidance. return here after the next injection. Sincerely,        Jesica Salinas.  Betty Arciniega M.D.

## 2017-02-02 NOTE — LETTER
2/2/2017 11:26 AM 
 
Patient:  Tana Dotson. YOB: 1980 Date of Visit: 2/2/2017 Dear Jerardo Alexandra MD 
[address] [facility] [address] [address] VIA In Basket Juan Carlos Jolley MD 
[address] [facility] [address] [facility] [address] VIA In Basket 
 : Thank you for referring Mr. Yudy Stewart to me for evaluation/treatment. Below are the relevant portions of my assessment and plan of care. Re:  Yudy Stewart ,Follow up visit     2/2/2017 11:16 AM 
 
SSN: [id number] Subjective:   Tana Dotson. returns with his mother for follow up after I injected the left arm for spasticity after a stroke which occurred back in 2014. There doesn't seem to be much change after the injection. He still has significant spasticity and flexion of the left hand. Medications:   
Current Outpatient Prescriptions Medication Sig Dispense Refill  azaTHIOprine (IMURAN) 50 mg tablet Take 50 mg by mouth three (3) times daily.  buPROPion XL (WELLBUTRIN XL) 150 mg tablet Take 150 mg by mouth every morning. Vital signs:   
Visit Vitals  /76 (BP 1 Location: Left arm, BP Patient Position: Sitting)  Pulse 79  Temp 98.6 °F (37 °C) (Oral)  Resp 14  
 Ht 6' 1\" (1.854 m)  Wt 80.9 kg (178 lb 6.4 oz)  SpO2 99%  BMI 23.54 kg/m2 Review of Systems: As above otherwise 11 point review of systems negative including;  
Constitutional no fever or chills Skin denies rash or itching HEENT  Denies tinnitus, hearing lose Eyes denies diplopia vision lose Respiratory denies sortness of breath Cardiovascular denies chest pain, dyspnea on exertion Gastrointestinal denies nausea, vomiting, diarrhea, constipation Genitourinary denies incontinence Musculoskeletal denies joint pain or swelling Endocrine denies weight change Hematology denies easy bruising or bleeding Neurological as above in HPI Patient Active Problem List  
Diagnosis Code  [facility]arcoidosis ([facility] D86.89  Left hemiparesis ([facility] G81.94  
 Advance care planning Z71.89 Objective: The patient is awake, alert, and oriented x 4. Fund of knowledge is adequate. Speech is fluent and memory is intact. Cranial Nerves: II  Visual fields are full to confrontation. III, IV, VI  Extraocular movements are intact. There is no nystagmus. V  Facial sensation is intact to pinprick. VII  Face is symmetrical.  VIII - Hearing is present. IX, X, XII  Palate is symmetrical.   XI - Shoulder shrugging and head turning intact Motor: The patient has significant spasticity of the left hand. He can't open the hand passively, especially the middle finger stays significantly flexed. Overall strength is no better than 2/5 distally, 4/5 more proximally. The leg is also weak at about 4/5 throughout. Reflexes are 2+ and symmetrical. Plantars are down going. Gait is left hemiparetic. CBC: No results found for: WBC, RBC, HGB, HCT, PLT, HGBEXT, HCTEXT, PLTEXT 
BMP: No results found for: GLU, NA, K, CL, CO2, BUN, CREA, CA 
CMP: No results found for: GLU, NA, K, CL, CO2, BUN, CREA, CA, AGAP, BUCR, TBIL, GPT, AP, TP, ALB, GLOB, AGRAT Coagulation: No results found for: PTP, INR, APTT, PTTT Cardiac markers: No results found for: CPK, CKND1, BRAYDON Assessment:  Severe spasticity after stroke leaving him with significant flexion deformities of the left hand. Plan: Will increase the amount of Botox at the nest injection and use EMG guidance. return here after the next injection. Sincerely, 
 
 
 
Kacey Romo. Tony Hampton M.D. If you have questions, please do not hesitate to call me. I look forward to following Mr. Brown Sessions along with you.  
 
 
 
Sincerely, 
 
 
Jessica Holden MD

## 2017-02-02 NOTE — MR AVS SNAPSHOT
Visit Information Date & Time Provider Department Dept. Phone Encounter #  
 2/2/2017 10:40 AM Zarina Mccray MD [facility] [phone] [id number] Follow-up Instructions Return in about 3 months (around 5/2/2017). Follow-up and Disposition History Your Appointments 2/27/2017  9:00 AM  
Follow Up with Yessenia Poole MD  
[facility] [address]) Appt Note: 6wk f/u  
 [address] [facility] [address] [facility] [address]  
[phone]  
  
   
 [facility] [address]  
  
    
 4/3/2017  8:40 AM  
Follow Up with Zarina Mccray MD  
[facility] [address] [place] [address] Road) Appt Note: BOTOX INJ; 200 units and under EMG guidance. [address] [facility] [address]  
[phone]  
  
   
 [facility] [address]  
  
    
 11/20/2017  2:30 PM  
Office Visit with Tello Llanes MD  
[address] [place] [address]) Appt Note: Return in about 1 year (around 11/17/2017) for medicare wellness exam.  
 [address] [facility] [address] [facility] 83 222 [facility]  
  
   
 [address] [facility] [address] Upcoming Health Maintenance Date Due DTaP/Tdap/Td series (1 - Tdap) 9/13/2000 Allergies as of 2/2/2017  Review Complete On: 2/2/2017 By: Thom Roland LPN No Known Allergies Current Immunizations  Reviewed on 11/17/2016 Name Date Influenza Vaccine 10/12/2016 Pneumococcal Polysaccharide (PPSV-23) 11/17/2016  3:27 PM  
  
 Not reviewed this visit You Were Diagnosed With   
  
 Codes Comments Spastic hemiplegia ([facility]    -  Primary ICD-10-CM: G81.10 ICD-9-CM: 342.10 Muscle spasticity     ICD-10-CM: F14.294 ICD-9-CM: 728.85 Vitals BP Pulse Temp Resp Height(growth percentile) Weight(growth percentile) 110/76 (BP 1 Location: Left arm, BP Patient Position: Sitting) 79 98.6 °F (37 °C) (Oral) 14 6' 1\" (1.854 m) 178 lb 6.4 oz (80.9 kg) SpO2 BMI Smoking Status 99% 23.54 kg/m2 Current Some Day Smoker Vitals History BMI and BSA Data Body Mass Index Body Surface Area  
 23.54 kg/m 2 2.04 m 2 Preferred Pharmacy Pharmacy Name Phone [facility] [address] [phone] Your Updated Medication List  
  
   
This list is accurate as of: 2/2/17 11:33 AM.  Always use your most recent med list.  
  
  
  
  
 azaTHIOprine 50 mg tablet Commonly known as:  The Pepsi Take 50 mg by mouth three (3) times daily. buPROPion  mg tablet Commonly known as:  Bettina Salm Take 150 mg by mouth every morning. Follow-up Instructions Return in about 3 months (around 5/2/2017). Introducing [facility] & HEALTH SERVICES! Dear Tang Schwarz: Thank you for requesting a [employer] account. Our records indicate that you already have an active [email or web address] account. You can access your account anytime at [email or web address] Did you know that you can access your hospital and ER discharge instructions at any time in [email or web address]? You can also review all of your test results from your hospital stay or ER visit. Additional Information If you have questions, please visit the Frequently Asked Questions section of the [email or web address] website at [email or web address] Remember, [email or web address] is NOT to be used for urgent needs. For medical emergencies, dial 911. Now available from your iPhone and Android! Please provide this summary of care documentation to your next provider. Your primary care clinician is listed as Cindi Woodall. If you have any questions after today's visit, please call [phone].

## 2017-02-27 NOTE — PROGRESS NOTES
[facility]             [address] [place], [address]    2/27/2017    HPI:  Chastity Muro is a 39 y.o., right handed,   male, who presents with left hand pain. Patient is accompanied by mother who provides much of the history. Old records did not accompany the patient according to mother he had bacterial meningitis in 2011 he had a hip replacement 814 due to a fall and fracture which is attributed to use of steroids with the meningitis. Subsequently he developed stroke with left-sided weakness he fell again and broke left arm following casting he developed contractures of the left hand he describes the hand pain as achy. Mother reports that the Neurontin seem to help the pain but made him walk drunken is somewhat drowsy. She has not continued the medicine. She states that the Dr. Mimi Wall and Suhas Estimerum diagnosed neurosarcoidosis. She he has been followed by neurology at [place] [address] is not taking his medications including antidepressants he denies depression but his mother feels that he is but he does not like to take medicine he does smoke a pack half pack per day  Patient denies any pain or significant improvement following the first Botox injection he is due for reassessment in a month by Dr. Betty Martel he has undergone neuropsychological testing but this was only a week ago. Results are not available for review records from [place] [address] are also not available. Mother wants a repeat MRI brain Patient only tried one dose neurontin but denies pain    Reviewed records from [facility] will still need actual copies of studies of MRI of the radiology can compare patient notes no change with Botox. He is seeing orthopedic surgeon for tendon release. Current Outpatient Prescriptions   Medication Sig Dispense Refill    buPROPion XL (WELLBUTRIN XL) 150 mg tablet Take 300 mg by mouth every morning.       azaTHIOprine (IMURAN) 50 mg tablet Take 50 mg by mouth three (3) times daily. Past Medical History:   Diagnosis Date    Arm pain     CVA [facility]cerebrovascular accident) ([facility] 2012    Meningitis     Meningitis     [facility]       Past Surgical History:   Procedure Laterality Date    HX HIP REPLACEMENT  2010    HX WISDOM TEETH EXTRACTION       History reviewed. No pertinent family history. No Known Allergies    Review of Systems:   Review of Systems - History obtained from mother and the patient  General ROS: negative  Psychological ROS: positive for - depression  ENT ROS: negative  Hematological and Lymphatic ROS: negative  Endocrine ROS: negative  Respiratory ROS: no cough, shortness of breath, or wheezing  Cardiovascular ROS: no chest pain or dyspnea on exertion  Gastrointestinal ROS: no abdominal pain, change in bowel habits, or black or bloody stools  Genito-Urinary ROS: no dysuria, trouble voiding, or hematuria  Musculoskeletal ROS: positive for - gait disturbance, muscular weakness and pain in arm - left and hand - left  Neurological ROS: positive for - impaired coordination/balance and weakness  Dermatological ROS: negative    PHYSICAL EXAMINATION:    Visit Vitals    /47    Pulse (!) 102    Temp 98.5 °F (36.9 °C) (Oral)    Ht 6' 1\" (1.854 m)    Wt 79.1 kg (174 lb 6.4 oz)    SpO2 98%    BMI 23.01 kg/m2     General:  Well defined, nourished, and groomed individual in no acute distress. Neck: Supple, nontender, thyroid within normal limits, no JVD, no bruits, no pain with resistance to active range of motion. Heart: Regular rate and rhythm, no murmurs, rub, or gallop. Normal S1S2. Lungs:  Clear to auscultation bilaterally with equal chest expansion, no cough, no wheeze  Musculoskeletal:  Extremities revealed no edema and had full range of motion of joints except left hand.         NEUROLOGICAL EXAMINATION:     Mental Status:   Alert and oriented to person, place,not time unable to assess memory  Attention span and concentration are normal. Speech is sparse with a fair fund of knowledge. Patient appears abulic     Cranial Nerves:    II, III, IV, VI:  Visual acuity grossly intact. Visual fields are normal.    Pupils are equal, round, and reactive to light and accommodation. Extra-ocular movements are full and fluid. , no ptosis or nystagmus. V-XII: Hearing is grossly intact. Facial features are symmetric,  Motor Examination: Normal tone, bulk, and strength, 5/5 muscle strength throughout right side  Consistent with effort  On right and lle decreased lue . No cogwheel rigidity or clonus present. Contracture left hand unable to extend fingers left hand    Coordination:  Heel-to-shin slow bilaterally. Finger to nose and rapid arm movement testing was better done on right . No resting or intention tremor    Gait and Station:left hemiparetic gait    Left pronator drift. Left hand atrophy      Assessment and Plan:   Ny Villalobos is a 39 y.o. right handed male whose history and physical are consistent with left hand pain secondary to spasticity from cva . Ny Rivera. who has risk factors including  Left hemiparesis secondary to cva/ neuroscarcoidosis. Becky Cordero was seen today for neurologic problem. Diagnoses and all orders for this visit:    Spastic hemiplegia ([address]  -     MRI BRAIN W WO CONT; Future    Muscle spasticity  -     MRI BRAIN W WO CONT; Future    Left hemiparesis (HCC)  -     MRI BRAIN W WO CONT; Future    Neurosarcoidosis (HCC)  -     MRI BRAIN W WO CONT; Future    Contracture, left hand  -     MRI BRAIN W WO CONT; Future      Follow-up Disposition:  Return in about 3 months (around 5/27/2017).    Reviewed with patient and mother recommendationsReviewed notes in chart ,need [facility]  Will repeat mri brain when previous one available for review I spent 30  minutes with the patient in face-to-face consultation, of which greater than 50% was spent in counseling and coordination of care as described above.

## 2017-02-27 NOTE — MR AVS SNAPSHOT
Visit Information Date & Time Provider Department Dept. Phone Encounter #  
 2/27/2017  9:00 AM Preeti Duran, [address] [id number] Follow-up Instructions Return in about 3 months (around 5/27/2017). Follow-up and Disposition History Your Appointments 4/3/2017  8:40 AM  
Follow Up with Shan Reese MD  
[address] [place] [address]) Appt Note: BOTOX INJ; 200 units and under EMG guidance. [address] [facility] [address]  
[phone]  
  
   
 [facility] [address]  
  
    
 11/20/2017  2:30 PM  
Office Visit with Nithya Akers MD  
[address] [facility] [address]) Appt Note: Return in about 1 year (around 11/17/2017) for medicare wellness exam.  
 [address] [facility] [address]  
  
   
 [address] [place] [address] [address] Upcoming Health Maintenance Date Due DTaP/Tdap/Td series (1 - Tdap) 9/13/2000 Allergies as of 2/27/2017  Review Complete On: 2/27/2017 By: Heidy Roger LPN No Known Allergies Current Immunizations  Reviewed on 11/17/2016 Name Date Influenza Vaccine 10/12/2016 Pneumococcal Polysaccharide (PPSV-23) 11/17/2016  3:27 PM  
  
 Not reviewed this visit You Were Diagnosed With   
  
 Codes Comments Spastic hemiplegia ([facility]    -  Primary ICD-10-CM: G81.10 ICD-9-CM: 342.10 Muscle spasticity     ICD-10-CM: H70.429 ICD-9-CM: 728.85 Left hemiparesis ([facility]     ICD-10-CM: G81.94 
ICD-9-CM: 342.90 Neurosarcoidosis ([facility]     ICD-10-CM: E57.21 
ICD-9-CM: 294 Contracture, left hand     ICD-10-CM: M24.542 ICD-9-CM: 718.44 Vitals BP  
  
  
  
  
  
 103/47 BMI and BSA Data Body Mass Index Body Surface Area 23.01 kg/m 2 2.02 m 2 Preferred Pharmacy Pharmacy Name Phone [facility] [address], [address] [place] [address] [phone] Your Updated Medication List  
  
   
This list is accurate as of: 2/27/17 10:11 AM.  Always use your most recent med list.  
  
  
  
  
 azaTHIOprine 50 mg tablet Commonly known as:  [employer] Take 50 mg by mouth three (3) times daily. buPROPion  mg tablet Commonly known as:  Kanu Havers Take 300 mg by mouth every morning. Follow-up Instructions Return in about 3 months (around 5/27/2017). To-Do List   
 02/27/2017 Imaging:  MRI BRAIN W WO CONT Introducing [address]! Dear Rogelio Nones: Thank you for requesting a [employer] account. Our records indicate that you already have an active [employer] account. You can access your account anytime at [email or web address] Did you know that you can access your hospital and ER discharge instructions at any time in [employer]? You can also review all of your test results from your hospital stay or ER visit. Additional Information If you have questions, please visit the Frequently Asked Questions section of the [employer] website at [email or web address] Remember, [employer] is NOT to be used for urgent needs. For medical emergencies, dial 911. Now available from your iPhone and Android! Please provide this summary of care documentation to your next provider. Your primary care clinician is listed as Helene Morales. If you have any questions after today's visit, please call [phone].

## 2017-03-01 NOTE — MR AVS SNAPSHOT
Visit Information Date & Time Provider Department Dept. Phone Encounter #  
 3/1/2017 10:45 AM Nabil Dao, [address] [facility] [phone] Follow-up Instructions Return in about 1 day (around 3/2/2017) for Results from CT. Your Appointments 3/16/2017  9:45 AM  
ROUTINE CARE with Nabil Doa MD  
[address] [address]) Appt Note: Pre-op Clearance [address] [facility] [address] [facility] [address]  
  
   
 [address] [facility] [address]  
  
    
 4/3/2017  8:40 AM  
Follow Up with Justina William MD  
[address] [place] [address]) Appt Note: BOTOX INJ; 200 units and under EMG guidance. [address] [facility] [address]  
[phone]  
  
   
 [place] [address]  
  
    
 5/26/2017  8:45 AM  
Follow Up with Mary Ellen Head MD  
[address] [place] [address]) Appt Note: 3mon f/u  
 [address] [facility] [address]  
[phone]  
  
   
 [place] [address]  
  
    
 11/20/2017  2:30 PM  
Office Visit with Nabil Dao MD  
[address] [address]) Appt Note: Return in about 1 year (around 11/17/2017) for medicare wellness exam.  
 [address] [facility] [address] [facility] [address]  
  
   
 [address] [facility] [address] Upcoming Health Maintenance Date Due DTaP/Tdap/Td series (1 - Tdap) 9/13/2000 Allergies as of 3/1/2017  Review Complete On: 3/1/2017 By: Nabil Dao MD  
 No Known Allergies Current Immunizations  Reviewed on 11/17/2016 Name Date Influenza Vaccine 10/12/2016 Pneumococcal Polysaccharide (PPSV-23) 11/17/2016  3:27 PM  
  
 Not reviewed this visit You Were Diagnosed With   
  
 Codes Comments Diplopia    -  Primary ICD-10-CM: H53.2 ICD-9-CM: 368.2 Vitals BP  
  
  
  
  
  
 113/77 (BP 1 Location: Right arm, BP Patient Position: Sitting) BMI and BSA Data Body Mass Index Body Surface Area 23.09 kg/m 2 2.02 m 2 Preferred Pharmacy Pharmacy Name Phone [facility] [address], [address] [place] [address] [phone] Your Updated Medication List  
  
   
This list is accurate as of: 3/1/17 12:06 PM.  Always use your most recent med list.  
  
  
  
  
 azaTHIOprine 50 mg tablet Commonly known as:  [employer] Pepsi Take 50 mg by mouth three (3) times daily. buPROPion  mg tablet Commonly known as:  Bettina Salm Take 300 mg by mouth every morning. Follow-up Instructions Return in about 1 day (around 3/2/2017) for Results from CT. To-Do List   
 03/01/2017 Imaging:  CT HEAD WO CONT   
  
 03/01/2017 3:00 PM  
  Appointment with [facility] CT RM 1 at [facility] RAD CT ([phone]) GENERAL INSTRUCTIONS  This study does not require you to drink contrast prior to your study. RELATED STUDY INFORMATION  Bring any films, CDs, and reports related with you on the day of your exam.  This only includes studies done outside of [address] [place] [address], [facility] [place], [place], and [place] [facility]. QUESTIONS  Notify the CT Department if you have any questions concerning your study. [place] -  Haritha Kinga [phone]  
  
 03/03/2017 7:30 PM  
  Appointment with [facility] MRI RM 1 at [address] [place] [address] ([phone]) GENERAL INSTRUCTIONS  Bring information (ID card) if you have any medically implanted devices. You will be required to lie still while the procedure is being performed. Remove any jewelry (including body piercing, hairpins) prior to MRI. If you have had a creatinine level drawn within the past 30 days, please bring most recent results to your appt.   Bring any films, CD's, and reports related to your study with you on the day of your exam.  This only includes studies done outside of [address] [place] [address], [facility] 1, [facility] [address], and [place]. Bring a complete list of all medications you are currently taking to include prescriptions, over-the-counter meds, herbals, vitamins & any dietary supplements. If you were given medications for claustrophobia or anxiety, please arrange to have someone drive you to your appointment. QUESTIONS  Notify the MRI Department if you have any questions concerning your study. [place]  - [phone] [address] - [phone] Referral Information Referral ID Referred By Referred To  
  
 [id number] ENEDELIA MADRIGAL Not Available Visits Status Start Date End Date 1 New Request 3/1/17 3/1/18 If your referral has a status of pending review or denied, additional information will be sent to support the outcome of this decision. Patient Instructions We have scheduled your STAT CT HEAD WO Contrast.  
3/1/2017 3:00 PM [address] RM 1 [address] Please arrive at 2:30 for check in, and follow up in 1 day for results in office. Introducing [facility]! Dear Amena Mooney: Thank you for requesting a [employer] account. Our records indicate that you already have an active [employer] account. You can access your account anytime at [email or web address] Did you know that you can access your hospital and ER discharge instructions at any time in [employer]? You can also review all of your test results from your hospital stay or ER visit. Additional Information If you have questions, please visit the Frequently Asked Questions section of the [employer] website at [email or web address] Remember, [employer] is NOT to be used for urgent needs. For medical emergencies, dial 911. Now available from your iPhone and Android! Please provide this summary of care documentation to your next provider. Your primary care clinician is listed as Jasmin Patten. If you have any questions after today's visit, please call [phone].

## 2017-03-01 NOTE — PROGRESS NOTES
Ibrahima Murray. is a 39 y.o.  male and presents with    Chief Complaint   Patient presents with    Headache    Blurred Vision     patient could not complete vision test  due to double vision in left eye     Subjective:  mr. Jaycee Quintero presents c/o one day of headache and double vision. he denies head injury. he has known neurosarcoidosis. he has not had visual disturbance on the past.  he has h/o stroke affecting the left side. he denies nausea or vomiting. he has been exposed to the flu. advil improved headache yesterday. Patient Active Problem List   Diagnosis Code    [facility] D86.89    Left hemiparesis ([facility] G81.94    Advance care planning Z71.89     Patient Active Problem List    Diagnosis Date Noted    Advance care planning 11/17/2016    [facility] 10/17/2016    Left hemiparesis ([facility] 10/17/2016     Current Outpatient Prescriptions   Medication Sig Dispense Refill    buPROPion XL (WELLBUTRIN XL) 150 mg tablet Take 300 mg by mouth every morning.  azaTHIOprine (IMURAN) 50 mg tablet Take 50 mg by mouth three (3) times daily. No Known Allergies  Past Medical History:   Diagnosis Date    Arm pain     [facility] 2012    Meningitis     Meningitis     Neurosarcoidosis ([facility] 2012     Past Surgical History:   Procedure Laterality Date    HX HIP REPLACEMENT  2010    HX WISDOM TEETH EXTRACTION       History reviewed. No pertinent family history.   Social History   Substance Use Topics    Smoking status: Current Some Day Smoker     Packs/day: 0.25    Smokeless tobacco: Never Used    Alcohol use No       ROS   General ROS: positive for - fever  Psychological ROS: negative for - anxiety  Ophthalmic ROS: negative for - eye pain  ENT ROS: negative for - nasal discharge, sinus pain or sore throat  Endocrine ROS: negative for - polydipsia/polyuria or temperature intolerance  Respiratory ROS: no cough, shortness of breath, or wheezing  Cardiovascular ROS: no chest pain or dyspnea on exertion  Gastrointestinal ROS: no abdominal pain, change in bowel habits, or black or bloody stools  Genito-Urinary ROS: no dysuria, trouble voiding, or hematuria  Musculoskeletal ROS: negative for - joint pain  Dermatological ROS: negative for - rash or skin lesion changes    All other systems reviewed and are negative. Objective:  Vitals:    03/01/17 1054   BP: 113/77   Pulse: (!) 116   Resp: 18   Temp: 99.3 °F (37.4 °C)   TempSrc: Oral   SpO2: 97%   Weight: 175 lb (79.4 kg)   Height: 6' 1\" (1.854 m)   PainSc:   7   PainLoc: Eye       oriented to person, place, and time, normal appearing weight and ill-appearing  Mental status - anxious  Eyes - pupils equal and reactive, left eye lateral gaze palsy intact  Chest - clear to auscultation, no wheezes, rales or rhonchi, symmetric air entry  Heart - normal rate, regular rhythm, normal S1, S2, no murmurs, rubs, clicks or gallops  Neurological - hemiparesis on left    TESTS  CT head without contrast - no hemorrhagic abnormalities    Assessment/Plan:    1. Diplopia  Discussed case with ophthalmology who recommended CT head; pain management with NSAID  - CT HEAD WO CONT; Future      Lab review: no lab studies available for review at time of visit      I have discussed the diagnosis with the patient and the intended plan as seen in the above orders. The patient has received an after-visit summary and questions were answered concerning future plans. I have discussed medication side effects and warnings with the patient as well. I have reviewed the plan of care with the patient, accepted their input and they are in agreement with the treatment goals. Follow-up Disposition:  Return in about 1 day (around 3/2/2017) for Results from CT.

## 2017-03-01 NOTE — PATIENT INSTRUCTIONS
We have scheduled your STAT CT HEAD WO Contrast.   3/1/2017 3:00 PM [facility] CT RM 1 [facility] RAD CT [facility]     Please arrive at 2:30 for check in, and follow up in 1 day for results in office.

## 2017-03-01 NOTE — PROGRESS NOTES
1. Have you been to the ER, urgent care clinic since your last visit? Hospitalized since your last visit? NO    2. Have you seen or consulted any other health care providers outside of the [address] [place] [address] since your last visit? Include any pap smears or colon screening.  NO

## 2017-03-02 PROBLEM — G03.9 MENINGITIS: Status: ACTIVE | Noted: 2017-01-01

## 2017-03-02 NOTE — ED PROVIDER NOTES
HPI Comments: 3:45 PM Alesha Childress. is a 39 y.o. male with a h/o Neurosarcoidosis, Smoking, Meningitis, and CVA 3 years ago presents to the ED from Dr. Cano Pap office with his mother c/o HA onset 4 days ago. Per mother stated that the pt saw his PCP yesterday, and early this morning where he received a shot of abx for the Flu. Pt states that he had no weakness from his last stroke. Per mother reported double vision, and that the pt has recently fallen. Pt reports left sided weakness onset 2 days ago that makes it difficult to ambulate, mild dry cough, and a fever of 101F. Pt denies chills, abd pain, nausea, vomiting, diarrhea, or chest pain. All other sx denied. No other complaints at this time. Irena Duggan MD      Patient is a 39 y.o. male presenting with headaches, gait problem, and fall. The history is provided by the patient and a parent. Headache    Associated symptoms include a fever (101F) and weakness (left sided). Pertinent negatives include no shortness of breath, no nausea and no vomiting. Gait Problem      Fall   Associated symptoms include a fever (101F) and headaches. Pertinent negatives include no abdominal pain, no nausea and no vomiting. Past Medical History:   Diagnosis Date    Arm pain     [facility]A [facility]cerebro[facility] accident) ([facility] 2012    Meningitis     Meningitis     Neurosarcoidosis ([facility] 2012       Past Surgical History:   Procedure Laterality Date    HX HIP REPLACEMENT  2010    HX WISDOM TEETH EXTRACTION           History reviewed. No pertinent family history. Social History     Social History    Marital status: SINGLE     Spouse name: N/A    Number of children: N/A    Years of education: N/A     Occupational History    Not on file.      Social History Main Topics    Smoking status: Current Some Day Smoker     Packs/day: 0.25    Smokeless tobacco: Never Used    Alcohol use No    Drug use: No    Sexual activity: Not Currently     Other Topics Concern    Not on file     Social History Narrative         ALLERGIES: Review of patient's allergies indicates no known allergies. Review of Systems   Constitutional: Positive for fever (101F). HENT: Negative for sore throat. Eyes: Negative for redness and visual disturbance. Double vision   Respiratory: Positive for cough (dry). Negative for shortness of breath and wheezing. Cardiovascular: Negative for chest pain. Gastrointestinal: Negative for abdominal pain, diarrhea, nausea and vomiting. Endocrine: Negative for polyuria. Genitourinary: Negative for dysuria. Musculoskeletal: Positive for gait problem. Negative for arthralgias and neck stiffness. Skin: Negative for rash. Neurological: Positive for weakness (left sided) and headaches. All other systems reviewed and are negative. Vitals:    03/08/17 1548 03/08/17 1953 03/08/17 2318 03/09/17 0309   BP: 125/82 121/76 123/66 137/84   Pulse: 62 71 77 62   Resp: 16 15 18 16   Temp: 97.7 °F (36.5 °C) 97.3 °F (36.3 °C) 98.1 °F (36.7 °C) 97.3 °F (36.3 °C)   SpO2: 100% 99% 97% 97%   Weight:       Height:                Physical Exam   Constitutional: He is oriented to person, place, and time. He appears well-developed and well-nourished. No distress. HENT:   Head: Normocephalic and atraumatic. Mouth/Throat: Oropharynx is clear and moist.   Eyes: Conjunctivae and EOM are normal. Pupils are equal, round, and reactive to light. No scleral icterus. Not able to lateral gaze on left eye. Neck: Normal range of motion. Neck supple. Cardiovascular: Regular rhythm and normal heart sounds. Tachycardia present. No murmur heard. Pulmonary/Chest: Effort normal and breath sounds normal. No respiratory distress. Abdominal: Soft. Bowel sounds are normal. He exhibits no distension. There is no tenderness. Musculoskeletal: He exhibits no edema. Lymphadenopathy:     He has no cervical adenopathy.    Neurological: He is alert and oriented to person, place, and time. Coordination normal.   Mild pronator drift on the left. Mild contraction to left hand. Skin: Skin is warm and dry. No rash noted. Psychiatric: He has a normal mood and affect. His behavior is normal.   Nursing note and vitals reviewed. MDM  Number of Diagnoses or Management Options  Meningitis:   Neurosarcoidosis in adult [facility]):   Diagnosis management comments: H/o neuro sarcoid with increased headache since Monday with intermittent fevers Ct yesterday shows     Several small poorly defined foci of hypodensity, central  gray asymmetrically bilaterally, primarily in right thalamus and adjacent  posterior limb internal capsule, may represent lacunar infarcts, age  indeterminate. Pt started on Rocephin 1 gram per pcp imuran and steroids and referred to ED pt noted to have Palsy L CNVI and L sided weakness (from previous CVA) discussed with Dr Kathren Severe recommended MRI brain with and without prior to LP and admission for further evaluation       Pt in MRI currently.  Care transferred to Dr Marca Dakin for further care and disposition          Amount and/or Complexity of Data Reviewed  Clinical lab tests: ordered and reviewed      ED Course       Procedures           Vitals:  Patient Vitals for the past 12 hrs:   Temp Pulse Resp BP SpO2   03/09/17 0309 97.3 °F (36.3 °C) 62 16 137/84 97 %   03/08/17 2318 98.1 °F (36.7 °C) 77 18 123/66 97 %   03/08/17 1953 97.3 °F (36.3 °C) 71 15 121/76 99 %         Medications ordered:   Medications   cefTRIAXone (ROCEPHIN) 2 g in 0.9% sodium chloride (MBP/ADV) 50 mL MBP (2 g IntraVENous New Bag 3/8/17 2156)   acetaminophen (TYLENOL) tablet 650 mg (650 mg Oral Given 3/7/17 1632)   enoxaparin (LOVENOX) injection 40 mg (40 mg SubCUTAneous Given 3/9/17 0129)   buPROPion XL (WELLBUTRIN XL) tablet 300 mg (300 mg Oral Given 3/9/17 0634)   VANCOMYCIN INFORMATION NOTE ( Other Canceled Entry 3/4/17 1000)   tamsulosin (FLOMAX) capsule 0.4 mg (0.4 mg Oral Given 3/8/17 0845)   cyanocobalamin (VITAMIN B12) injection 1,000 mcg (1,000 mcg IntraMUSCular Given 3/7/17 1628)   pantoprazole (PROTONIX) tablet 40 mg (40 mg Oral Given 3/8/17 0852)   dexamethasone (DECADRON) tablet 4 mg (4 mg Oral Given 3/9/17 0634)   bacitracin 500 unit/gram packet 1 Packet (not administered)   sodium chloride (NS) flush 10-30 mL (not administered)   sodium chloride (NS) flush 10 mL (10 mL InterCATHeter Given 3/8/17 1749)   sodium chloride (NS) flush 10 mL (not administered)   sodium chloride (NS) flush 10-40 mL (10 mL InterCATHeter Given 3/9/17 0635)   sodium chloride (NS) flush 20 mL (20 mL InterCATHeter Given 3/8/17 1749)   sodium chloride 0.9 % bolus infusion 1,000 mL (0 mL IntraVENous IV Completed 3/2/17 1945)   gadobutrol (Gadavist) contrast solution 7.5 mL (7.5 mL IntraVENous Given 3/2/17 1925)   acetaminophen (TYLENOL) tablet 1,000 mg (1,000 mg Oral Given 3/2/17 2039)   predniSONE (DELTASONE) tablet 60 mg (60 mg Oral Given 3/2/17 2105)   pantoprazole (PROTONIX) tablet 40 mg (40 mg Oral Given 3/2/17 2104)   vancomycin (VANCOCIN) 2,250 mg in 0.9% sodium chloride 500 mL IVPB (0 mg IntraVENous IV Completed 3/2/17 2300)   diphenhydrAMINE (BENADRYL) injection 25 mg (25 mg IntraVENous Given 3/2/17 2305)   famotidine (PF) (PEPCID) injection 20 mg (20 mg IntraVENous Given 3/2/17 2305)   vancomycin (VANCOCIN) 1,000 mg in 0.9% sodium chloride (MBP/ADV) 250 mL adv (0 mg IntraVENous IV Completed 3/3/17 0440)   gadobutrol (Gadavist) contrast solution 7.5 mL (7.5 mL IntraVENous Given 3/6/17 1915)   gadobutrol (Gadavist) contrast solution 7.5 mL (7.5 mL IntraVENous Given 3/7/17 1919)   lidocaine (XYLOCAINE) 10 mg/mL (1 %) injection 1-5 mL (2 mL SubCUTAneous Given 3/8/17 1420)         Lab findings:  No results found for this or any previous visit (from the past 12 hour(s)).     EKG interpretation by ED Physician:      X-Ray, CT or other radiology findings or impressions:  MRI BRAIN W WO CONT   Final Result MRA BRAIN W WO CONT   Final Result      MRI BRAIN W WO CONT   Final Result      XR CHEST PORT   Final Result          Progress notes, Consult notes or additional Procedure notes:       Reevaluation of patient:       Disposition:  Diagnosis:   1. Meningitis    2. Neurosarcoidosis in adult [facility])        Disposition:     Follow-up Information     Follow up With Details Comments 24[address] Vanessa Perdomo MD   [id number] [facility]  [address] [place] [address]  [phone]              Current Discharge Medication List      START taking these medications    Details   bacitracin 500 unit/gram ointment Apply 1 Packet to affected area as needed (Catheter Removal Dressing). Qty: 1 Packet, Refills: 0      cefTRIAXone 2 gram 2 g IVPB 2 g by IntraVENous route every twelve (12) hours every twelve (12) hours. Qty: 16 Dose, Refills: 0      dexamethasone (DECADRON) 4 mg tablet 4 mg po q 8 hrs x 1 week. Then 4 mg po q 12 hrs x 1 week. Then start prednisone as per neurology  Qty: 35 Tab, Refills: 0         CONTINUE these medications which have CHANGED    Details   azaTHIOprine (IMURAN) 50 mg tablet Take 1 Tab by mouth daily. Qty: 30 Tab, Refills: 0         CONTINUE these medications which have NOT CHANGED    Details   acetaminophen-codeine (TYLENOL #3) 300-30 mg per tablet Take 1 Tab by mouth every four (4) hours as needed for Pain. Max Daily Amount: 6 Tabs. Qty: 20 Tab, Refills: 0    Associated Diagnoses: Intractable headache, unspecified chronicity pattern, unspecified headache type; Acute left ankle pain      buPROPion XL (WELLBUTRIN XL) 150 mg tablet Take 300 mg by mouth every morning.          STOP taking these medications       cefTRIAXone (ROCEPHIN) 1 gram injection Comments:   Reason for Stopping:         predniSONE (DELTASONE) 20 mg tablet Comments:   Reason for Stopping:         ketorolac tromethamine (TORADOL) 60 mg/2 mL soln Comments:   Reason for Stopping:             Scribe [address] scribing for and in the presence of Wanda Bhat MD 3:45 PM, 03/09/17. Physician Attestation  I personally performed the services described in the documentation, reviewed the documentation, as recorded by the scribe in my presence, and it accurately and completely records my words and actions.     Wanda Bhat MD 3:45 PM 03/09/17        Signed by : Claudette Lucky, Scribe, 03/09/17 at 3:45 PM

## 2017-03-02 NOTE — ED NOTES
Purposeful rounding completed:    Side rails up x 2:  YES  Bed in low position and wheels locked: YES  Call bell within reach: YES  Comfort addressed: YES    Toileting needs addressed: YES  Plan of care reviewed/updated with patient and or family members: YES  IV site assessed: YES  Pain assessed and addressed: YES, 8    Mom at bedside.  Lights turned out for patient

## 2017-03-02 NOTE — PROGRESS NOTES
Maxx Gómez is a 39 y.o.  male and presents with    Chief Complaint   Patient presents with    Fever    Headache           Subjective:  Mr. Caryle Province presents for f/u headache, diplopia and fever. he continue to have fever with Tmax 101 at home. his mother gave him advil last night but not this morning. he has been off balance and had a fall last night.  he has left foot pain after his fall.  his father has the flu.  he denies sore throat, myalgias or nasal congestion. he had CT of head yesterday and has MRI scheduled tomorrow. Mr. Caryle Province has neurosarcoidosis with h/o stroke. Patient Active Problem List   Diagnosis Code    [facility]arcoidosis ([facility] D86.89    [facility] G81.94    Advance care planning Z71.89     Patient Active Problem List    Diagnosis Date Noted    Advance care planning 11/17/2016    [facility] 10/17/2016    [facility] hemiparesis [facility] 10/17/2016     Current Outpatient Prescriptions   Medication Sig Dispense Refill    buPROPion XL (WELLBUTRIN XL) 150 mg tablet Take 300 mg by mouth every morning.  azaTHIOprine (IMURAN) 50 mg tablet Take 50 mg by mouth three (3) times daily. No Known Allergies  Past Medical History:   Diagnosis Date    Arm pain     [facility]cerebrovascular accident) ([facility] [address] 2012    Meningitis     Meningitis     [facility]     Past Surgical History:   Procedure Laterality Date    HX HIP REPLACEMENT  2010    HX WISDOM TEETH EXTRACTION       History reviewed. No pertinent family history.   Social History   Substance Use Topics    Smoking status: Current Some Day Smoker     Packs/day: 0.25    Smokeless tobacco: Never Used    Alcohol use No       ROS   General ROS: negative for - malaise or weight loss  Ophthalmic ROS: positive for - vision disturbance  ENT ROS: negative for - nasal congestion, sinus pain or sore throat  Respiratory ROS: no cough, shortness of breath, or wheezing  Cardiovascular ROS: no chest pain or dyspnea on exertion  Gastrointestinal ROS: no abdominal pain, change in bowel habits, or black or bloody stools  Genito-Urinary ROS: no dysuria, trouble voiding, or hematuria  Dermatological ROS: negative for - rash or skin lesion changes    All other systems reviewed and are negative. Objective:  Vitals:    03/02/17 0931   BP: 116/73   Pulse: (!) 107   Resp: 18   Temp: 100.4 °F (38 °C)   TempSrc: Oral   SpO2: 100%   Weight: 175 lb (79.4 kg)   Height: 6' 1\" (1.854 m)   PainSc:   8   PainLoc: Head       Alert and oriented to person, place, and time, normal appearing weight and ill-appearing  Mental status - depressed mood  Eyes - eye patch over left eye; lateral gaze palsy left eye  Chest - clear to auscultation, no wheezes, rales or rhonchi, symmetric air entry  Heart - normal rate, regular rhythm, normal S1, S2, no murmurs, rubs, clicks or gallops  Neurological - unsteady gait and station  Musculoskeletal - abnormal exam of left ankle with TTP lateral and medial malleolus    Assessment/Plan:    1. Fever, unspecified fever cause  Negative flu; ceftriaxone given for prophylactic coverage; no improvement in headache with toradol injection; sent to ER.  - AMB POC RAPID INFLUENZA TEST  - cefTRIAXone (ROCEPHIN) 1 gram injection; 1 g by IntraMUSCular route once for 1 dose. Dispense: 1 Vial; Refill: 0  - CEFTRIAXONE SODIUM INJECTION  MG (Qty 4 for 1 gm)  - THER/PROPH/DIAG INJECTION, SUBCUT/IM  - CBC WITH AUTOMATED DIFF; Future    2. Diplopia    - REFERRAL TO OPHTHALMOLOGY    3. Intractable headache, unspecified chronicity pattern, unspecified headache type  No improvement with toradol injection; pain management  - predniSONE (DELTASONE) 20 mg tablet; Take 3 Tabs by mouth daily (with breakfast) for 7 days. Dispense: 21 Tab; Refill: 0  - ketorolac tromethamine (TORADOL) 60 mg/2 mL soln; 2 mL by IntraMUSCular route once for 1 dose.   Dispense: 1 Vial; Refill: 0  - KETOROLAC TROMETHAMINE INJ  - acetaminophen-codeine (TYLENOL #3) 300-30 mg per tablet; Take 1 Tab by mouth every four (4) hours as needed for Pain. Max Daily Amount: 6 Tabs. Dispense: 20 Tab; Refill: 0    4. Acute left ankle pain  Evaluate for fracture; he has malleoli TTP  - acetaminophen-codeine (TYLENOL #3) 300-30 mg per tablet; Take 1 Tab by mouth every four (4) hours as needed for Pain. Max Daily Amount: 6 Tabs. Dispense: 20 Tab; Refill: 0  - XR ANKLE LT MIN 3 V; Future      Lab review: orders written for new lab studies as appropriate; see orders      I have discussed the diagnosis with the patient and the intended plan as seen in the above orders. The patient has received an after-visit summary and questions were answered concerning future plans. I have discussed medication side effects and warnings with the patient as well. I have reviewed the plan of care with the patient, accepted their input and they are in agreement with the treatment goals. Sent to ER due to refractive headache and fever.

## 2017-03-02 NOTE — IP AVS SNAPSHOT
Jose Shed 
 
 
 [address] [place] [address] 
[phone] Patient: Mick Omer. MRN: [id number] [facility]:[id number] You are allergic to the following No active allergies Recent Documentation Height Weight BMI Smoking Status 1.88 m 79 kg 22.36 kg/m2 Current Some Day Smoker Unresulted Labs Order Current Status MYELIN BASIC PROTEIN, CSF In process THIOPURINE METHYLTRANSFERASE In process AFB CULTURE + SMEAR W/RFLX ID FROM CULTURE Preliminary result CULTURE, FUNGUS Preliminary result Emergency Contacts Name Discharge Info Relation Home Work Mobile Qing Carrera DISCHARGE CAREGIVER [3] Mother [14] [phone] About your hospitalization You were admitted on:  March 2, 2017 You last received care in the:  [address] NEURO MED You were discharged on:  March 9, 2017 Unit phone number:  [phone] Why you were hospitalized Your primary diagnosis was:  Meningitis Your diagnoses also included:  Neurosarcoidosis (Hcc), Headache Providers Seen During Your Hospitalizations Provider Role Specialty Primary office phone Angel Barrera MD Attending Provider Emergency Medicine [phone] Jaycee Rosa MD Attending Provider Emergency Medicine [phone] Fritz Valdez MD Attending Provider [facility] [phone] Abby Scales DO Attending Provider Internal Medicine [phone] Job Nino MD Attending Provider Internal Medicine [phone] Your Primary Care Physician (PCP) Primary Care Physician Office Phone Office Fax Alex 39, aTmara 70 Follow-up Information Follow up With Details Comments Contact Info Nithya Akers MD   [address] [place], [place] [address] [address] [address] [place] [address] [phone] Your Appointments Thursday March 16, 2017  9:45 AM EDT ROUTINE CARE with Lee Castrejon MD  
[address] [address]) [id number] [place] [address] [address] [place] [address]  
[phone] Monday April 03, 2017  8:40 AM EDT Follow Up with Jesus Barger MD  
[address]) [address] [facility] [address] [phone] Current Discharge Medication List  
  
START taking these medications Dose & Instructions Dispensing Information Comments Morning Noon Evening Bedtime  
 bacitracin 500 unit/gram ointment Your next dose is: Today, Tomorrow Other:  _________ Dose:  1 Packet Apply 1 Packet to affected area as needed (Catheter Removal Dressing). Quantity:  1 Packet Refills:  0  
     
   
   
   
  
 cefTRIAXone 2 gram 2 g IVPB Your next dose is: Today, Tomorrow Other:  _________ Dose:  2 g  
2 g by IntraVENous route every twelve (12) hours every twelve (12) hours. Quantity:  16 Dose Refills:  0  
     
   
   
   
  
 dexamethasone 4 mg tablet Commonly known as:  DECADRON Your next dose is: Today, Tomorrow Other:  _________  
   
   
 4 mg po q 8 hrs x 1 week. Then 4 mg po q 12 hrs x 1 week. Then start prednisone as per neurology Quantity:  35 Tab Refills:  0 CONTINUE these medications which have CHANGED Dose & Instructions Dispensing Information Comments Morning Noon Evening Bedtime  
 azaTHIOprine 50 mg tablet Commonly known as:  The Pepsi What changed:  when to take this Your next dose is: Today, Tomorrow Other:  _________ Dose:  50 mg Take 1 Tab by mouth daily. Quantity:  30 Tab Refills:  0 CONTINUE these medications which have NOT CHANGED Dose & Instructions Dispensing Information Comments Morning Noon Evening Bedtime acetaminophen-codeine 300-30 mg per tablet Commonly known as:  TYLENOL #3 Your next dose is: Today, Tomorrow Other:  _________ Dose:  1 Tab Take 1 Tab by mouth every four (4) hours as needed for Pain. Max Daily Amount: 6 Tabs. Quantity:  20 Tab Refills:  0  
     
   
   
   
  
 buPROPion  mg tablet Commonly known as:  Scheryl Abdiel Your next dose is: Today, Tomorrow Other:  _________ Dose:  300 mg Take 300 mg by mouth every morning. Refills:  0 STOP taking these medications   
 cefTRIAXone 1 gram injection Commonly known as:  ROCEPHIN  
   
  
 ketorolac tromethamine 60 mg/2 mL Soln Commonly known as:  TORADOL  
   
  
 predniSONE 20 mg tablet Commonly known as:  Shade Ok Where to Get Your Medications Information on where to get these meds will be given to you by the nurse or doctor. ! Ask your nurse or doctor about these medications  
  azaTHIOprine 50 mg tablet  
 bacitracin 500 unit/gram ointment  
 cefTRIAXone 2 gram 2 g IVPB  
 dexamethasone 4 mg tablet Discharge Instructions DISCHARGE SUMMARY from Nurse The following personal items are in your possession at time of discharge: 
 
Dental Appliances: None Home Medications: None Jewelry: Bracelet, Other (comment) (rubber bracelet ) Clothing: None Other Valuables: Other (comment) (eye patch) PATIENT INSTRUCTIONS: 
 
After general anesthesia or intravenous sedation, for 24 hours or while taking prescription Narcotics: · Limit your activities · Do not drive and operate hazardous machinery · Do not make important personal or business decisions · Do  not drink alcoholic beverages · If you have not urinated within 8 hours after discharge, please contact your surgeon on call. Report the following to your surgeon: 
· Excessive pain, swelling, redness or odor of or around the surgical area · Temperature over 100.5 · Nausea and vomiting lasting longer than 4 hours or if unable to take medications · Any signs of decreased circulation or nerve impairment to extremity: change in color, persistent  numbness, tingling, coldness or increase pain · Any questions What to do at [facility]: 
* Recommended activity: Activity as tolerated * If you experience any of the following symptoms as listed in your teaching for Meningitis under \"When Should You call for help?, please follow up with you Doctor/ and nurse. *  Please give a list of your current medications to your Primary Care Provider. *  Please update this list whenever your medications are discontinued, doses are 
    changed, or new medications (including over-the-counter products) are added. *  Please carry medication information at all times in case of emergency situations. Bacterial Meningitis: Care Instructions Your Care Instructions Bacterial meningitis is an infection of the tissues that surround the brain and spinal cord. This serious infection can injure the brain. It can be life-threatening. How long it takes you to get better depends on how bad the illness is. It can take from just a couple of weeks to many months. You may have changes in how you think or concentrate. Most people with these symptoms get better over time. Be patient. Follow your doctor's instructions. Follow-up care is a key part of your treatment and safety. Be sure to make and go to all appointments, and call your doctor if you are having problems. It's also a good idea to know your test results and keep a list of the medicines you take. How can you care for yourself at home? · If your doctor prescribed antibiotics, take them as directed. Do not stop taking them just because you feel better. You need to take the full course of antibiotics.  
· Take an over-the-counter pain medicine, such as acetaminophen (Tylenol), ibuprofen (Advil, Motrin), or naproxen (Aleve) for pain or fever. Be safe with medicines. Read and follow all instructions on the label. · Do not take two or more pain medicines at the same time unless the doctor told you to. Many pain medicines have acetaminophen, which is Tylenol. Too much acetaminophen (Tylenol) can be harmful. · Get plenty of rest. 
· Talk with your doctor about any new symptoms that develop, such as changes in your hearing or trouble concentrating. · Be sure that anyone who has come into close contact with you during this illness calls a doctor if he or she feels sick. When should you call for help? Call 911 anytime you think you may need emergency care. For example, call if: 
· You have a seizure. Call your doctor now or seek immediate medical care if: 
· Your symptoms come back or get worse. These may include: ¨ A fever. ¨ A severe headache. ¨ A stiff neck. ¨ Nausea and vomiting. · You have trouble thinking or concentrating. Watch closely for changes in your health, and be sure to contact your doctor if: 
· You do not get better as expected. Where can you learn more? Go to http://robin-gray.info/. Enter M535 in the search box to learn more about \"Bacterial Meningitis: Care Instructions. \" Current as of: May 24, 2016 Content Version: 11.1 © 2677-7007 mymxlog. Care instructions adapted under license by [employer] (which disclaims liability or warranty for this information). If you have questions about a medical condition or this instruction, always ask your healthcare professional. [place] [address] any warranty or liability for your use of this information. These are general instructions for a healthy lifestyle: No smoking/ No tobacco products/ Avoid exposure to second hand smoke Surgeon General's Warning:  Quitting smoking now greatly reduces serious risk to your health. Obesity, smoking, and sedentary lifestyle greatly increases your risk for illness A healthy diet, regular physical exercise & weight monitoring are important for maintaining a healthy lifestyle You may be retaining fluid if you have a history of heart failure or if you experience any of the following symptoms:  Weight gain of 3 pounds or more overnight or 5 pounds in a week, increased swelling in our hands or feet or shortness of breath while lying flat in bed. Please call your doctor as soon as you notice any of these symptoms; do not wait until your next office visit. Recognize signs and symptoms of STROKE: 
 
F-face looks uneven A-arms unable to move or move unevenly S-speech slurred or non-existent T-time-call 911 as soon as signs and symptoms begin-DO NOT go Back to bed or wait to see if you get better-TIME IS BRAIN. Warning Signs of HEART ATTACK Call 911 if you have these symptoms: 
? Chest discomfort. Most heart attacks involve discomfort in the center of the chest that lasts more than a few minutes, or that goes away and comes back. It can feel like uncomfortable pressure, squeezing, fullness, or pain. ? Discomfort in other areas of the upper body. Symptoms can include pain or discomfort in one or both arms, the back, neck, jaw, or stomach. ? Shortness of breath with or without chest discomfort. ? Other signs may include breaking out in a cold sweat, nausea, or lightheadedness. Don't wait more than five minutes to call [address]! Fast action can save your life. Calling 911 is almost always the fastest way to get lifesaving treatment. Emergency Medical Services staff can begin treatment when they arrive  up to an hour sooner than if someone gets to the hospital by car. iExplore Activation Thank you for requesting access to iExplore. Please follow the instructions below to securely access and download your online medical record.  iExplore allows you to send messages to your doctor, view your test results, renew your prescriptions, schedule appointments, and more. How Do I Sign Up? 1. In your internet browser, go to [email or web address]. 2. Click on the First Time User? Click Here link in the Sign In box. You will see the New Member Sign Up page. 3. Enter your Loftware Access Code exactly as it appears below. You will not need to use this code after youve completed the sign-up process. If you do not sign up before the expiration date, you must request a new code. Loftware Access Code: Activation code not generated Current Loftware Status: Active (This is the date your Loftware access code will ) 4. Enter the last four digits of your Social Security Number (xxxx) and Date of Birth (mm/dd/yyyy) as indicated and click Submit. You will be taken to the next sign-up page. 5. Create a Loftware ID. This will be your Loftware login ID and cannot be changed, so think of one that is secure and easy to remember. 6. Create a Loftware password. You can change your password at any time. 7. Enter your Password Reset Question and Answer. This can be used at a later time if you forget your password. 8. Enter your e-mail address. You will receive e-mail notification when new information is available in [address] 9. Click Sign Up. You can now view and download portions of your medical record. 10. Click the Download Summary menu link to download a portable copy of your medical information. Additional Information If you have questions, please visit the Frequently Asked Questions section of the Loftware website at [email or web address] Remember, Loftware is NOT to be used for urgent needs. For medical emergencies, dial 911. Patient discharged without removing armband and transfered to another healthcare acute, sub acute , or extended care facility. Informed of privacy risks if armband lost or stolen The discharge information has been reviewed with the patient. The patient verbalized understanding. Discharge medications reviewed with the patient and appropriate educational materials and side effects teaching were provided. Discharge Orders None Sanovation Announcement We are excited to announce that we are making your provider's discharge notes available to you in Sanovation. You will see these notes when they are completed and signed by the physician that discharged you from your recent hospital stay. If you have any questions or concerns about any information you see in Sanovation, please call the Health Information Department where you were seen or reach out to your Primary Care Provider for more information about your plan of care. Introducing [facility]! Dear Flex Mead: Thank you for requesting a Sanovation account. Our records indicate that you already have an active Sanovation account. You can access your account anytime at [email or web address] Did you know that you can access your hospital and ER discharge instructions at any time in Sanovation? You can also review all of your test results from your hospital stay or ER visit. Additional Information If you have questions, please visit the Frequently Asked Questions section of the Sanovation website at [email or web address] Remember, Sanovation is NOT to be used for urgent needs. For medical emergencies, dial 911. Now available from your iPhone and Android! General Information Please provide this summary of care documentation to your next provider. Patient Signature:  ____________________________________________________________ Date:  ____________________________________________________________  
  
[facility]:  ____________________________________________________________ Date:  ____________________________________________________________

## 2017-03-02 NOTE — TELEPHONE ENCOUNTER
Patients mother called and stated that the patient seems to be getting worse and is stating that his headache is just not getting any better. Spoke with Dr. Zaid Max who stated that patient should go to the ER. Notified patients mother to report to the ER. Verbal understanding given. Closing encounter.

## 2017-03-02 NOTE — IP AVS SNAPSHOT
Current Discharge Medication List  
  
Take these medications at their scheduled times Dose & Instructions Dispensing Information Comments Morning Noon Evening Bedtime  
 azaTHIOprine 50 mg tablet Commonly known as:  The Pepsi Your next dose is: Today, Tomorrow Other:  ____________ Dose:  50 mg Take 1 Tab by mouth daily. Quantity:  30 Tab Refills:  0  
     
   
   
   
  
 buPROPion  mg tablet Commonly known as:  Tracy Burgos Your next dose is: Today, Tomorrow Other:  ____________ Dose:  300 mg Take 300 mg by mouth every morning. Refills:  0  
     
   
   
   
  
 cefTRIAXone 2 gram 2 g IVPB Your next dose is: Today, Tomorrow Other:  ____________ Dose:  2 g  
2 g by IntraVENous route every twelve (12) hours every twelve (12) hours. Quantity:  16 Dose Refills:  0 Take these medications as needed Dose & Instructions Dispensing Information Comments Morning Noon Evening Bedtime  
 acetaminophen-codeine 300-30 mg per tablet Commonly known as:  TYLENOL #3 Your next dose is: Today, Tomorrow Other:  ____________ Dose:  1 Tab Take 1 Tab by mouth every four (4) hours as needed for Pain. Max Daily Amount: 6 Tabs. Quantity:  20 Tab Refills:  0  
     
   
   
   
  
 bacitracin 500 unit/gram ointment Your next dose is: Today, Tomorrow Other:  ____________ Dose:  1 Packet Apply 1 Packet to affected area as needed (Catheter Removal Dressing). Quantity:  1 Packet Refills:  0 Take these medications as directed Dose & Instructions Dispensing Information Comments Morning Noon Evening Bedtime  
 dexamethasone 4 mg tablet Commonly known as:  DECADRON Your next dose is: Today, Tomorrow Other:  ____________ 4 mg po q 8 hrs x 1 week. Then 4 mg po q 12 hrs x 1 week. Then start prednisone as per neurology Quantity:  35 Tab Refills:  0 Where to Get Your Medications Information about where to get these medications is not yet available ! Ask your nurse or doctor about these medications  
  azaTHIOprine 50 mg tablet  
 bacitracin 500 unit/gram ointment  
 cefTRIAXone 2 gram 2 g IVPB  
 dexamethasone 4 mg tablet

## 2017-03-02 NOTE — PROGRESS NOTES
Patient presents to clinic for follow up on CT scan. Patient mother states that his temperature had reached 101.6 F at night and had given the patient otc strength advil and found that 4 hours later the temperature had stayed the same. She also states that he also has had some discharge from both eyes. Advance Directive:    1. Do you have an advance directive in place? Patient Reply:     2. If not, would you like material regarding how to put one in place? Patient Reply:      Coordination of Care:    1. Have you been to the ER, urgent care clinic since your last visit? Hospitalized since your last visit? No    2. Have you seen or consulted any other health care providers outside of the [facility] since your last visit? Include any pap smears or colon screening. No     Depression Screening completed. Learning Assessment completed. Abuse Screening completed. Health Maintenance reviewed and discussed per provider.

## 2017-03-02 NOTE — ED NOTES
MRI called to see when patient will be taken.  Izabella states that they are on there way to get him now

## 2017-03-02 NOTE — Clinical Note
Status[de-identified] Inpatient [101] Type of Bed: Telemetry [19] Inpatient Hospitalization Certified Necessary for the Following Reasons: 3. Patient receiving treatment that can only be provided in an inpatient setting (further clarification in H&P documentation) Admitting Diagnosis: Meningitis [[id number]] Admitting Physician: Cliff Mcgee Attending Physician: Cliff Mcgee Estimated Length of Stay: > or = to 2 Midnights Discharge Plan[de-identified] Home with Office Follow-up

## 2017-03-02 NOTE — MR AVS SNAPSHOT
Visit Information Date & Time Provider Department Dept. Phone Encounter #  
 3/2/2017  9:30 AM Juanita Randall, [address] [facility] [id number] Your Appointments 3/16/2017  9:45 AM  
ROUTINE CARE with Juanita Randall MD  
[address] [address]) Appt Note: Pre-op Clearance [address] [place] [address] [facility] [address]  
  
   
 [address] [facility] [address]  
  
    
 4/3/2017  8:40 AM  
Follow Up with Charlene Rogel MD  
[facility]) Appt Note: BOTOX INJ; 200 units and under EMG guidance. [address] [facility] [id number] [phone]  
  
   
 [place] [id number]  
  
    
 5/26/2017  8:45 AM  
Follow Up with Keisha Aponte MD  
[facility]) Appt Note: 3mon f/u  
 [address] [facility] [address] [facility] [address]  
[phone]  
  
   
 [place] [id number]  
  
    
 11/20/2017  2:30 PM  
Office Visit with Juanita Randall MD  
[address] [address]) Appt Note: Return in about 1 year (around 11/17/2017) for medicare wellness exam.  
 [address] [facility] [address] [facility] [address]  
  
   
 [address] [place] [address] [facility] Upcoming Health Maintenance Date Due DTaP/Tdap/Td series (1 - Tdap) 9/13/2000 Allergies as of 3/2/2017  Review Complete On: 3/2/2017 By: Juanita Randall MD  
 No Known Allergies Current Immunizations  Reviewed on 11/17/2016 Name Date Influenza Vaccine 10/12/2016 Pneumococcal Polysaccharide (PPSV-23) 11/17/2016  3:27 PM  
  
 Not reviewed this visit You Were Diagnosed With   
  
 Codes Comments Fever, unspecified fever cause    -  Primary ICD-10-CM: R50.9 ICD-9-CM: 780.60 Diplopia     ICD-10-CM: H53.2 ICD-9-CM: 368.2 Intractable headache, unspecified chronicity pattern, unspecified headache type     ICD-10-CM: R51 ICD-9-CM: 784.0 Acute left ankle pain     ICD-10-CM: M25.572 ICD-9-CM: 719.47 Vitals BP  
  
  
  
  
  
 116/73 (BP 1 Location: Right arm, BP Patient Position: Sitting) Vitals History BMI and BSA Data Body Mass Index Body Surface Area 23.09 kg/m 2 2.02 m 2 Preferred Pharmacy Pharmacy Name Phone [facility] [address], [address] [place] [address] [phone] Your Updated Medication List  
  
   
This list is accurate as of: 3/2/17 11:01 AM.  Always use your most recent med list.  
  
  
  
  
 acetaminophen-codeine 300-30 mg per tablet Commonly known as:  TYLENOL #3 Take 1 Tab by mouth every four (4) hours as needed for Pain. Max Daily Amount: 6 Tabs. azaTHIOprine 50 mg tablet Commonly known as:  [employer] Take 50 mg by mouth three (3) times daily. buPROPion  mg tablet Commonly known as:  Janna Ferrcori Take 300 mg by mouth every morning. cefTRIAXone 1 gram injection Commonly known as:  ROCEPHIN  
1 g by IntraMUSCular route once for 1 dose. ketorolac tromethamine 60 mg/2 mL Soln Commonly known as:  TORADOL  
2 mL by IntraMUSCular route once for 1 dose. predniSONE 20 mg tablet Commonly known as:  Marcelene Im Take 3 Tabs by mouth daily (with breakfast) for 7 days. Prescriptions Printed Refills  
 acetaminophen-codeine (TYLENOL #3) 300-30 mg per tablet 0 Sig: Take 1 Tab by mouth every four (4) hours as needed for Pain. Max Daily Amount: 6 Tabs. Class: Print Route: Oral  
  
Prescriptions Sent to Pharmacy Refills  
 predniSONE (DELTASONE) 20 mg tablet 0 Sig: Take 3 Tabs by mouth daily (with breakfast) for 7 days. Class: Normal  
 Pharmacy: [facility] [address], [address] [place] [address] Ph #: [phone]  Route: Oral  
 We Performed the Following AMB POC RAPID INFLUENZA TEST [55067 CPT(R)] CEFTRIAXONE SODIUM INJECTION  MG [ \[facility]\""] Comments:  
 Mix per protocol KETOROLAC TROMETHAMINE INJ [ \[facility]\""] MN THER/PROPH/DIAG INJECTION, SUBCUT/IM [id number] CPT(R)] REFERRAL TO OPHTHALMOLOGY [REF57 Custom] Comments:  
 Please evaluate 40 y/o male patient for diplopia. To-Do List   
 03/02/2017 Imaging:  XR ANKLE LT MIN 3 V   
  
 03/03/2017  7:30 PM  
  Appointment with [facility] MRI RM 1 at [address] MRI ([phone]) GENERAL INSTRUCTIONS  Bring information (ID card) if you have any medically implanted devices. You will be required to lie still while the procedure is being performed. Remove any jewelry (including body piercing, hairpins) prior to MRI. If you have had a creatinine level drawn within the past 30 days, please bring most recent results to your appt. Bring any films, CD's, and reports related to your study with you on the day of your exam.  This only includes studies done outside of [address] [place] [address], [facility], [facility], and [facility]. Bring a complete list of all medications you are currently taking to include prescriptions, over-the-counter meds, herbals, vitamins & any dietary supplements. If you were given medications for claustrophobia or anxiety, please arrange to have someone drive you to your appointment. QUESTIONS  Notify the MRI Department if you have any questions concerning your study. [facility] - [phone] [facility] [address] [facility] SURGERY [place] - [phone] Referral Information Referral ID Referred By Referred To  
  
 [id number] MD [address]   
   [address] [facility] Phone: [phone] Fax: [phone] Visits Status Start Date End Date 1 New Request 3/2/17 3/2/18  If your referral has a status of pending review or denied, additional information will be sent to support the outcome of this decision. Introducing [facility]! Dear Shawn Younger: Thank you for requesting a vSocial account. Our records indicate that you already have an active vSocial account. You can access your account anytime at [email or web address] Did you know that you can access your hospital and ER discharge instructions at any time in vSocial? You can also review all of your test results from your hospital stay or ER visit. Additional Information If you have questions, please visit the Frequently Asked Questions section of the vSocial website at [email or web address] Remember, vSocial is NOT to be used for urgent needs. For medical emergencies, dial 911. Now available from your iPhone and Android! Please provide this summary of care documentation to your next provider. Your primary care clinician is listed as Haja Amato. If you have any questions after today's visit, please call [phone].

## 2017-03-02 NOTE — ED NOTES
Spoke with Izabella in MRI about patient having hip replacement done in [facility] and having no MRI since and unknown if its metal and MRI states hip replacements are okay.  Dr. Sarah Gaxiola made aware

## 2017-03-02 NOTE — ED NOTES
The Sepsis Screening has been completed on arrival in the Emergency Department. Vital signs:  Patient Vitals for the past 4 hrs:   Temp Pulse Resp BP SpO2   03/02/17 1618 100.1 °F (37.8 °C) - - - -   03/02/17 1545 98.8 °F (37.1 °C) - - - -   03/02/17 1544 - (!) 107 14 - 100 %   03/02/17 1542 - - - 122/71 -       MAP (Monitor): 80    =monitored (data validate)       =calculated (manual entry)    Patient meets 2 or more SIRS criteria with suspected infection? YES    IF ANSWER IS YES, INITIATE NURSE DRIVEN SEPSIS ORDERS OR REQUEST SEPSIS BUNDLE ORDER BY PROVIDER.      SIRS Criteria is achieved when two or more of the following are present:    Temperature < 96.8°F (36°C) or > 100.9°F (38.3°C)   Heart Rate > 90 beats per minute   Respiratory Rate > 20 beats per minute   WBC count > 12,000 or <4,000 or > 10% bands

## 2017-03-03 PROBLEM — R51.9 HEADACHE: Status: ACTIVE | Noted: 2017-01-01

## 2017-03-03 NOTE — ED NOTES
Vitals:  Patient Vitals for the past 12 hrs:   Temp Pulse Resp BP SpO2   03/02/17 2015 - (!) 103 15 132/84 99 %   03/02/17 2000 - 95 15 130/81 98 %   03/02/17 1830 - 96 18 137/85 96 %   03/02/17 1800 - 86 16 133/77 99 %   03/02/17 1730 - 87 16 126/84 99 %   03/02/17 1618 100.1 °F (37.8 °C) - - - -   03/02/17 1612 - 100 15 - 99 %   03/02/17 1545 98.8 °F (37.1 °C) (!) 101 15 127/76 100 %   03/02/17 1544 - (!) 107 14 - 100 %   03/02/17 1542 - - - 122/71 -         Medications ordered:   Medications   0.9% sodium chloride infusion (150 mL/hr IntraVENous New Bag 3/2/17 1601)   sodium chloride 0.9 % bolus infusion 1,000 mL (0 mL IntraVENous IV Completed 3/2/17 1945)   gadobutrol (Gadavist) contrast solution 7.5 mL (7.5 mL IntraVENous Given 3/2/17 1925)         Lab findings:  Recent Results (from the past 12 hour(s))   AMB POC RAPID INFLUENZA TEST    Collection Time: 03/02/17 10:13 AM   Result Value Ref Range    VALID INTERNAL CONTROL POC Yes     QuickVue Influenza test Negative Negative   CBC WITH AUTOMATED DIFF    Collection Time: 03/02/17  4:04 PM   Result Value Ref Range    WBC 12.4 4.6 - 13.2 K/uL    RBC 4.36 (L) 4.70 - 5.50 M/uL    HGB 11.5 (L) 13.0 - 16.0 g/dL    HCT 36.0 36.0 - 48.0 %    MCV 82.6 74.0 - 97.0 FL    MCH 26.4 24.0 - 34.0 PG    MCHC 31.9 31.0 - 37.0 g/dL    RDW 17.2 (H) 11.6 - 14.5 %    PLATELET 129 807 - 917 K/uL    MPV 9.7 9.2 - 11.8 FL    NEUTROPHILS 73 40 - 73 %    LYMPHOCYTES 17 (L) 21 - 52 %    MONOCYTES 9 3 - 10 %    EOSINOPHILS 1 0 - 5 %    BASOPHILS 0 0 - 2 %    ABS. NEUTROPHILS 9.2 (H) 1.8 - 8.0 K/UL    ABS. LYMPHOCYTES 2.1 0.9 - 3.6 K/UL    ABS. MONOCYTES 1.1 0.05 - 1.2 K/UL    ABS. EOSINOPHILS 0.1 0.0 - 0.4 K/UL    ABS.  BASOPHILS 0.0 0.0 - 0.06 K/UL    DF AUTOMATED     HEMOGLOBIN A1C WITH EAG    Collection Time: 03/02/17  4:04 PM   Result Value Ref Range    Hemoglobin A1c 5.0 4.2 - 5.6 %    Est. average glucose 97 mg/dL   INFLUENZA A & B AG (RAPID TEST)    Collection Time: 03/02/17  4:24 PM Result Value Ref Range    Influenza A Antigen NEGATIVE  NEG      Influenza B Antigen NEGATIVE  NEG     URINALYSIS W/ RFLX MICROSCOPIC    Collection Time: 03/02/17  4:26 PM   Result Value Ref Range    Color YELLOW      Appearance CLOUDY      Specific gravity <1.005 (L) 1.005 - 1.030    pH (UA) 6.5 5.0 - 8.0      Protein NEGATIVE  NEG mg/dL    Glucose NEGATIVE  NEG mg/dL    Ketone NEGATIVE  NEG mg/dL    Bilirubin NEGATIVE  NEG      Blood SMALL (A) NEG      Urobilinogen 0.2 0.2 - 1.0 EU/dL    Nitrites NEGATIVE  NEG      Leukocyte Esterase NEGATIVE  NEG     URINE MICROSCOPIC ONLY    Collection Time: 03/02/17  4:26 PM   Result Value Ref Range    WBC 0 to 2 0 - 4 /hpf    RBC 0 to 2 0 - 5 /hpf    Epithelial cells FEW 0 - 5 /lpf    Bacteria NEGATIVE  NEG /hpf    Mucus 2+ (A) NEG /lpf   METABOLIC PANEL, COMPREHENSIVE    Collection Time: 03/02/17  4:50 PM   Result Value Ref Range    Sodium 136 136 - 145 mmol/L    Potassium 3.8 3.5 - 5.5 mmol/L    Chloride 98 (L) 100 - 108 mmol/L    CO2 29 21 - 32 mmol/L    Anion gap 9 3.0 - 18 mmol/L    Glucose 90 74 - 99 mg/dL    BUN 8 7.0 - 18 MG/DL    Creatinine 0.88 0.6 - 1.3 MG/DL    BUN/Creatinine ratio 9 (L) 12 - 20      GFR est AA >60 >60 ml/min/1.73m2    GFR est non-AA >60 >60 ml/min/1.73m2    Calcium 7.7 (L) 8.5 - 10.1 MG/DL    Bilirubin, total 0.6 0.2 - 1.0 MG/DL    ALT (SGPT) 18 16 - 61 U/L    AST (SGOT) 21 15 - 37 U/L    Alk.  phosphatase 85 45 - 117 U/L    Protein, total 7.3 6.4 - 8.2 g/dL    Albumin 3.3 (L) 3.4 - 5.0 g/dL    Globulin 4.0 2.0 - 4.0 g/dL    A-G Ratio 0.8 0.8 - 1.7     CARDIAC PANEL,(CK, CKMB & TROPONIN)    Collection Time: 03/02/17  4:50 PM   Result Value Ref Range     (H) 39 - 308 U/L    CK - MB 1.3 <3.6 ng/ml    CK-MB Index 0.1 0.0 - 4.0 %    Troponin-I, Qt. <0.02 0.0 - 0.045 NG/ML   POC LACTIC ACID    Collection Time: 03/02/17  4:55 PM   Result Value Ref Range    Lactic Acid (POC) 0.5 0.4 - 2.0 mmol/L       EKG interpretation by ED Physician:      X-Ray, CT or other radiology findings or impressions:  XR CHEST PORT   Final Result      MRI BRAIN W WO CONT    (Results Pending)     increased T2 signal in the left tom/thalamus with partial enhancement. no significant mass effect. Findings nonspecific, but possibly related to h/o known neurosarcoidosis. Comparison to remote/outside imaging may be helpful if available to assess chronicity of these findings. No definite restricted diffusion to suggest stroke. Please see full neurorad interpretation in a.m. Progress notes, Consult notes or additional Procedure notes: t/o from dr Avtar Berman pending mri, le results; reviewed teleneurology consult    After verbal consent obtained from patient and with mother present I d/w both regarding risks/benefits and they agree to proceed. Pt had lp done in past so he was familiar. I prepped and draped pt in usual fashion. Betadine prep used. Located l4-5 interspace. 1.5 cc lidocaine for local anethesia. Left side down position. 20 g 3.5 in needle used. One pass with return of clear fluid, collected 6ml total in 4 tubes. No opening pressure obtained. nv intact with no le sx after or during procedure. bandaid placed. Pt tolerated well. Results reviewed and will place on abx for suspected meningitis to include acyclovir  D/w pt and mother rec plan for admission    D/w Dr Marguerite Vegas who will admit    ED Critical Care Note    System at risk for life threatening failure: neuro, cardiac, pulm  Associated problems: meningitis, fever, low tsh    Critical Care services provided: bedside management, consult, documentation  Excluded procedures (time not included in critical care): pulse interp, LP    Total Critical Care Time (in minutes) 43          Reevaluation of patient:   stable    Disposition:  Diagnosis: No diagnosis found.     Disposition: admit      Follow-up Information     None            Patient's Medications   Start Taking    No medications on file Continue Taking    ACETAMINOPHEN-CODEINE (TYLENOL #3) 300-30 MG PER TABLET    Take 1 Tab by mouth every four (4) hours as needed for Pain. Max Daily Amount: 6 Tabs. AZATHIOPRINE (IMURAN) 50 MG TABLET    Take 50 mg by mouth three (3) times daily. BUPROPION XL (WELLBUTRIN XL) 150 MG TABLET    Take 300 mg by mouth every morning. CEFTRIAXONE (ROCEPHIN) 1 GRAM INJECTION    1 g by IntraMUSCular route once for 1 dose. KETOROLAC TROMETHAMINE (TORADOL) 60 MG/2 ML SOLN    2 mL by IntraMUSCular route once for 1 dose. PREDNISONE (DELTASONE) 20 MG TABLET    Take 3 Tabs by mouth daily (with breakfast) for 7 days.    These Medications have changed    No medications on file   Stop Taking    No medications on file

## 2017-03-03 NOTE — ED NOTES
Patient used urinal and spilled on bed. Upon cleaning patient, noted that patients IV site on the right forearm was swollen, erythematous, and warm. Vancomycin stopped immediately. 10mL of blood pulled back from IV and 10mL flushed in. Dr. Jong Colorado informed.  Per Dr. Jong Colorado, begin acyclovir, and can begin vancomycin at 0125 at a new rate of 125ml/hr

## 2017-03-03 NOTE — ROUTINE PROCESS
Bedside and Verbal shift change report given to Elfrieda Litten (oncoming nurse) by Caden garcía rn (offgoing nurse). Report given with SBAR, Kardex, Intake/Output and Recent Results.

## 2017-03-03 NOTE — PROGRESS NOTES
Problem: Self Care Deficits Care Plan (Adult)  Goal: *Acute Goals and Plan of Care (Insert Text)  Occupational Therapy Goals  Initiated 3/3/2017 within 7 day(s). 1. Patient will perform grooming tasks with supervision/set-up   2. Patient will perform upper body dressing with supervision/set-up. 3. Patient will perform lower body dressing with moderate assistance . 4. Patient will perform toilet transfers with supervision/set-up. 5. Patient will perform all aspects of toileting with supervision/set-up. 6. Patient will participate in upper extremity therapeutic exercise/activities with minimal assistance/contact guard assist for 8 minutes to increase/maintain BUE function for ADLs. 7. Patient will utilize energy conservation techniques during functional activities with verbal cues. Outcome: Progressing Towards Goal  OCCUPATIONAL THERAPY EVALUATION     Patient: Billy Adame (40 y.o. male)  Date: 3/3/2017  Primary Diagnosis: Meningitis  Meningitis        Precautions:  Fall; droplet; contact      ASSESSMENT :  Based on the objective data described below, the patient presents with impairments with regard to bed mobility in preparation for ADLs, activity tolerance, BUE function/strength, and overall independence in ADLs. Patient has approx 1/2 shoulder flex of LUE with impaired gross motor/fine motor skills of L hand secondary to previous CVA. CGA for bed mobility; min A/CGA to stand. Min A for [facility] gown. Patient reports his family assists with LB dressing PTA. Patient educated on role of OT and POC; verbalized understanding. Patient will benefit from skilled OT services during acute care stay to maximize safety during functional mobility, increase activity tolerance, and facilitate independence in ADLs. Patient left supine with HOB elevated and needs within reach. Pain level at start of session, 8/10; post-evaluation: 10/10. Nursing aware.      Patient will benefit from skilled intervention to address the above impairments. Patients rehabilitation potential is considered to be Good  Factors which may influence rehabilitation potential include:   [ ]             None noted  [ ]             Mental ability/status  [X]             Medical condition  [ ]             Home/family situation and support systems  [ ]             Safety awareness  [ ]             Pain tolerance/management  [ ]             Other:           PLAN :  Recommendations and Planned Interventions:  [X]               Self Care Training                  [X]        Therapeutic Activities  [X]               Functional Mobility Training    [ ]        Cognitive Retraining  [X]               Therapeutic Exercises           [X]        Endurance Activities  [X]               Balance Training                   [X]        Neuromuscular Re-Education  [ ]               Visual/Perceptual Training     [X]   Home Safety Training  [X]               Patient Education                 [X]        Family Training/Education  [ ]               Other (comment):     Frequency/Duration: Patient will be followed by occupational therapy 3-5 times a week to address goals. Discharge Recommendations: Rehab vs. SNF  Further Equipment Recommendations for Discharge: TBD at next level of care       SUBJECTIVE:   Patient stated \"I need help with my socks. \"      OBJECTIVE DATA SUMMARY:       Past Medical History:   Diagnosis Date    Arm pain      CVA [facility]cerebrovascular accident) ([facility] [address] 2012    Meningitis      Meningitis      [facility]     Past Surgical History:   Procedure Laterality Date    HX HIP REPLACEMENT   2010    HX WISDOM TEETH EXTRACTION         Barriers to Learning/Limitations: None  Compensate with: visual, verbal, tactile, kinesthetic cues/model     GCODES:  Self Care  Current  CL= 60-79%   Goal  CJ= 20-39%.   The severity rating is based on the Other Functional Assessment, MMT, ROM     Eval Complexity: History: MEDIUM Complexity : Expanded review of history including physical, cognitive and psychosocial  history ; Examination: MEDIUM Complexity : 3-5 performance deficits relating to physical, cognitive , or psychosocial skils that result in activity limitations and / or participation restrictions; Decision Making:MEDIUM Complexity : Patient may present with comorbidities that affect occupational performnce. Miniml to moderate modification of tasks or assistance (eg, physical or verbal ) with assesment(s) is necessary to enable patient to complete evaluation      Prior Level of Function/Home Situation: Patient required assistance with most basic self care tasks and independent in functional mobility PTA. Home Situation  Home Environment: Private residence  # Steps to Enter: 0  One/Two Story Residence: Two story  Living Alone: No  Support Systems: Family member(s)  Patient Expects to be Discharged to[de-identified] Private residence  Current DME Used/Available at Home: Bipin Gelineau, quad, Shower chair, Walker, rolling  Tub or Shower Type: Tub/Shower combination (has shower chair; no grab bars)  [X]  Right hand dominant          [ ]  Left hand dominant     Cognitive/Behavioral Status:  Neurologic State: Alert  Orientation Level: Oriented X4  Cognition: Follows commands  Safety/Judgement: Awareness of environment; Fall prevention      Skin: Intact (BUEs)  Edema: None noted (BUEs)     Vision/Perceptual:  Diplopia: Yes    Acuity: Able to read clock/calendar on wall without difficulty    Corrective Lenses:  (eye patch for L eye from previous CVA)      Coordination:  Coordination: Grossly decreased, non-functional (BUEs; LUE: grossly decreased; RUE: WFL)  Fine Motor Skills-Upper: Right Intact; Left Impaired    Gross Motor Skills-Upper: Right Intact; Left Impaired      Balance:  Sitting: Impaired  Sitting - Static: Good (unsupported)  Sitting - Dynamic: Fair (occasional)  Standing: Impaired; With support  Standing - Static: Fair (Fair+)  Standing - Dynamic : Fair (Tano Ayala-) Strength:  Strength: Generally decreased, functional (BUEs; RUE: approx 4/5; LUE: approx 2+/5)     Tone & Sensation:  Tone: Abnormal (BUEs; RUE WFL; LUE: flexor tone in hand)  Sensation: Impaired (BUEs; RUE intact; LUE: impaired)     Range of Motion:  AROM: Generally decreased, functional (BUEs; RUE: WFL; LUE: approx 1/2 shoulder flex)  PROM: Within functional limits (BUEs)     Functional Mobility and Transfers for ADLs:  Bed Mobility:  Rolling: Contact guard assistance; Additional time  Supine to Sit: Contact guard assistance; Additional time  Sit to Supine: Contact guard assistance; Additional time  Scooting: Contact guard assistance      Transfers:  Sit to Stand: Contact guard assistance;Minimum assistance              Toilet Transfer :  (not assessed)                ADL Assessment:  Feeding: Setup  Oral Facial Hygiene/Grooming: Setup;Supervision  Bathing: Moderate assistance  Upper Body Dressing: Minimum assistance  Lower Body Dressing: Maximum assistance  Toileting: Minimum assistance     Cognitive Retraining  Safety/Judgement: Awareness of environment; Fall prevention     Pain:  Pre treatment pain level: 8/10  Post treatment pain level:10/10  Pain Scale 1: Numeric (0 - 10)  Pain Intensity 1: 10  Pain Location 1: Head     Activity Tolerance:  Fair  Please refer to the flowsheet for vital signs taken during this treatment. After treatment:   [ ] Patient left in no apparent distress sitting up in chair  [X] Patient left in no apparent distress in bed  [X] Call bell left within reach  [X] Nursing notified  [ ] Caregiver present  [ ] Bed alarm activated      COMMUNICATION/EDUCATION: Patient educated on role of OT and POC. He verbalized understanding.   [X] Home safety education was provided and the patient/caregiver indicated understanding. [X] Patient/family have participated as able in goal setting and plan of care. [X] Patient/family agree to work toward stated goals and plan of care.   [ ] Patient understands intent and goals of therapy, but is neutral about his/her participation. [ ] Patient is unable to participate in goal setting and plan of care.      Thank you for this referral.     Paulina Rodriguez MS OTR/L  Time Calculation: 20 mins

## 2017-03-03 NOTE — PROGRESS NOTES
Problem: Discharge Planning  Goal: *Discharge to safe environment  Outcome: Progressing Towards Goal  Plan SNF

## 2017-03-03 NOTE — PROGRESS NOTES
0730  Received pt on bed, on Droplet and contact precaution, maintained, pt wearing eye pad to left eye to prevent double vision, was incontinent of large urine, pt talking and  Talking clear, left hand remain closed but able to open  The thumb and forefinger, able to raise it, moving all extremities but his left side is weaker, right  Hand with  but left  Hand can not  but able to move it. 1000 swallow pill, moves legs per pt he closed his left eye when talking , left eye sees double vision, no pain. 36  His mom was at bedside, he was talking to his mom, speech is clear, denies pain, moving all  Extremities, was incontinent 2x with  A lot of urine but the mom was saying he is not voiding much, monitor. 56  Was sleeping, wake him up, to eat, refused to eat at this time, ask him to move all 4 extremities, he  Moves all, talking and following commands. 1600  Dr Abigail De Anda  Want to transfer pt to ICU, he is calling Dr Yao, pt informed, awaiting bed. 1700  I called neuro ICU for report but bed not ready yet. 1750  Report given to  ICU, pt sleeping but easily awaken, incontinent of urine, mouth care and april care done.

## 2017-03-03 NOTE — PROGRESS NOTES
STAT MRI wet read-  Please see final neurorad interpretation in a.m. Increased T2/FLAIR signal in the left tom/thalamus with small focus of enhancement internally. No significant mass effect. No restricted diffusion to suggest acute stroke. Findings nonspecific but possibly related to known h/o neurosarcoid. Other differential considerations would include cerebritis and neoplasm less likely. Comparison to remote/outside MRI may be helpful to assess chronicity of these findings.          Tucker Calderon MD  Vascular & Interventional Radiology  [facility]  3/2/2017

## 2017-03-03 NOTE — PROGRESS NOTES
[facility]   Discharge Planning/ Assessment    Reasons for Intervention: Spoke with pt and mother Sergio Charles. Pts mother provided pt info. Demographics and phone numbers confirmed. Pt lives with mother. Pt was able to ambulate independently at home and prepare microwaved meals. Per mother pt has a history of CVA and is on disability. Pts pcp is Dr. Lennard Boas last visit 2/28. Plan possible SNF pending PT eval. Posted to edischarge to all [facility] facilities. Mother given list to choose facilities.            High Risk Criteria  [x] Yes  []No   Physician Referral  [] Yes  []No        Date    Nursing Referral  [] Yes  []No        Date    Patient/Family Request  [] Yes  []No        Date       Resources:    Medicare  [x] Yes  []No   Medicaid  [x] Yes  []No   No Resources  [] Yes  []No   Private Insurance  [] Yes  []No    Name/Phone Number    Other  [] Yes  []No        (i.e. Workman's Comp)         Prior Services:    Prior Services  [] Yes  [x]No   Home Health  [] Yes  []No   [address]  [] Yes  []No        Number of [address]  [] Yes  []No       Meals on Wheels  [] Yes  []No   Office on Aging  [] Yes  []No   Transportation Services  [] Yes  []No   Nursing Home  [] Yes  []No        Nursing Home Name    [address]  [] Yes  []No        [address] Name    Other       Information Source:      Information obtained from  [] Patient  [x] Parent   [] [address]  [] Child  [] Spouse   [] Significant Other/Partner   [] Friend      [] EMS    [] Nursing Home Chart          [] Other:   Chart Review  [] Yes  []No     Family/Support System:    Patient lives with  [] Alone    [] Spouse   [] Significant Other  [] Children  [] Caretaker   [x] Parent  [] Sibling     [] Other       Other Support System:    Is the patient responsible for care of others  [] Yes  [x]No   Information of person caring for patient on  discharge    Managers financial affairs independently  [] Yes  [x]No   If no, explain:      Status Prior to Admission:    Mental Status  [] Awake  [] Alert  [] Oriented  [] Quiet/Calm [x] Lethargic/Sedated   [] Disoriented  [] Restless/Anxious  [] Combative   Personal Care  [] Dependent  [x] Independent Personal Care  [] Requires Assistance   Meal Preparation Ability  [] Independent   [] Standby Assistance   [x] Minimal Assistance   [] Moderate Assistance  [] Maximum Assistance     [] Total Assistance   Chores  [] Independent with Chores   [] N/A Nursing Home Resident   [x] Requires Assistance   Bowel/Bladder  [x] Continent  [] Catheter  [] Incontinent  [] Ostomy Self-Care    [] Urine Diversion Self-Care  [] Maximum Assistance     [] Total Assistance   Number of Persons needed for assistance    DME at home  [] Servando Thomas  [] Milana Thomas   [] Commode    [] Bathroom/Grab Bars  [] Hospital Bed  [] Nebulizer  [] Oxygen           [] Raised Toilet Seat  [] Shower Chair  [] Side Rails for Bed   [] Tub Transfer Bench   [] Genao Ashley  [] Alaina Pryor      [] Other:   Vendor      Treatment Presently Receiving:    Current Treatments  [] Chemotherapy  [] Dialysis  [] Insulin  [] IVAB [] IVF   [] O2  [] PCA   [] PT   [] RT   [] Tube Feedings   [] Wound Care     Psychosocial Evaluation:    Verbalized Knowledge of Disease Process  [] Patient  [x]Family   Coping with Disease Process  [] Patient  [x]Family   Requires Further Counseling Coping with Disease Process  [x] Patient  []Family     Identified Projected Needs:    Home Health Aid  [] Yes  [x]No   Transportation  [x] Yes  []No   Education  [] Yes  [x]No        Specific Education     Financial Counseling  [] Yes  [x]No   Inability to Care for Self/Will Require 24 hour care  [] Yes  [x]No   Pain Management  [] Yes  [x]No   Home Infusion Therapy  [] Yes  [x]No   Oxygen Therapy  [] Yes  [x]No   DME  [x] Yes  []No   Long Term Care Placement  [] Yes  [x]No   Rehab  [x] Yes  []No   Physical Therapy  [x] Yes []No   Needs Anticipated At This Time  [x] Yes  []No     Intra-Hospital Referral:    [address] [facility]  [] Yes  [x]No     [] Yes  [x]No   Patient Representative  [] Yes  [x]No   Staff for Teaching Needs  [] Yes  [x]No   Specialty Teaching Needs     Diabetic Educator  [] Yes  [x]No   Referral for Diabetic Educator Needed  [] Yes  [x]No  If Yes, place order for Nutritionist or Diabetic Consult     Tentative Discharge Plan:    Home with No Services  [] Yes  []No   Home with [address]  [] Yes  []No        If Yes, specify type    [address]  [] Yes  []No        If Yes, specify type    Meals on Wheels  [] Yes  []No   Office of Aging  [] Yes  []No   NHP  [] Yes  []No   Return to the Nursing Home  [] Yes  []No   Rehab Therapy  [x] Yes  []No   Acute Rehab  [] Yes  []No   Subacute Rehab  [] Yes  []No   Private Care  [] Yes  []No   Substance Abuse Referral  [] Yes  []No   Transportation  [] Yes  []No   Chore Service  [] Yes  []No   Inpatient Hospice  [] Yes  []No   OP RT  [] Yes  [] No   OP Hemo  [] Yes  [] No   OP PT  [] Yes  []No   Support Group  [] Yes  []No   Reach to Recovery  [] Yes  []No   OP Oncology Clinic  [] Yes  []No   Clinic Appointment  [] Yes  []No   DME  [] Yes  []No   Comments    Name of D/C Planner or  Given to Patient or Family Sim Varela RN   Phone Number [phone]        Extension    Date 03/03/17   Time    If you are discharged home, whom do you designate to participate in your discharge plan and receive any information needed?      Enter name of designee         Phone # of designee         Address of designee         Updated         Patient refused to designate any           individual

## 2017-03-03 NOTE — PROGRESS NOTES
[facility] Dosing Services     Pharmacy dosing Vancomycin for treatment of Central Nervous System Infection     Started on a regimen of Vancomycin 2250 mg IV x 1 dose (stopped due to Red Man Syndrome- only 1/3 infused). Vancomycin 1 g IV x 1 dose was entered to complete loading dose since the original loading dose could not be reprogrammed into the pump. Day of Therapy: 1    Other Antibiotics: Ceftriaxone, Acyclovir    Labs:   Bun/Serum Creatinine - 8/0.88  CrCl - >120 ml/min  WBC - 12.4     Cultures:    3/2/17 Fungus Smear: Pending  3/2/17 Blood x 2: Pending   3/2/17 CSF: Pending     Plan:  Initiated Vancomycin 1.5 g IV Q 8H for goal peak/trough of 15-20. Ordered trough level on 3/4/17. Pharmacy to follow and will make changes to dose and/or frequency based on clinical status. Thank you,  Piyush Lopez, PharmD, M.S.   [address] [address]   21

## 2017-03-03 NOTE — ED NOTES
Bedside shift change report received from [address] [place] [address]. Assumed care of patient.

## 2017-03-03 NOTE — H&P
History and Physical    Patient: Vinicius Feng. Sex: male          DOA: 3/2/2017       YOB: 1980      Age:  39 y.o.        LOS:  LOS: 0 days        Chief Complaint   Patient presents with    Headache    Gait Problem    Fall         HPI:     Vinicius Feng. is a 39 y.o. male with pmhx of neurosarcoidosis, depression, left hemiparesis, meningitis, stroke who presents with headache onset 3 days. Patient mother is the historian, she reports that patient c/o headache 3 days ago and took Advil with no relief . The headache continued the next day and was again not relived by advil. Patient saw his PCP yesterday for above symptoms and was given Rocephin IV and Toradol. Patient has associated gait problem , diplopia, neck pain and Fever. At home T max 101. Denies abdominal pain , chest pain, sob , cough. He also c/o fall and s/p fall ankle pain which was negative for fracture or dislocation on imaging. In ED patient was seen by tele neurology. CT head was negative for acute intracranial abnormality with recommendation for a follow up MRI brain. MRI brain was also done did not show any definite restricted diffusion signals to suggest stroke. Patient had a LP and CSF analysis with CSF protein 103, CSF , CSF glucose 46, CSF RBC 8. CSF culture and gram stain and Blood culture are pending. Patient was started on Rocephin IV, Vancomycin IV and Acyclovir. Patient developed KECIA following Vancomycin infusion. It was stopped and patient was given benadryl, Ranitidine . Admit for further treatment. Past Medical History:   Diagnosis Date    Arm pain     [facility]ce[facility]brovascular accident) [facility] [address] 2012    Meningitis     Meningitis     Neurosarcoidosis ([facility] [address] 2012   . Past Surgical History:   Procedure Laterality Date    HX HIP REPLACEMENT  2010    HX WISDOM TEETH EXTRACTION         No current facility-administered medications on file prior to encounter.       Current Outpatient Prescriptions on File Prior to Encounter   Medication Sig Dispense Refill    cefTRIAXone (ROCEPHIN) 1 gram injection 1 g by IntraMUSCular route once for 1 dose. 1 Vial 0    predniSONE (DELTASONE) 20 mg tablet Take 3 Tabs by mouth daily (with breakfast) for 7 days. 21 Tab 0    ketorolac tromethamine (TORADOL) 60 mg/2 mL soln 2 mL by IntraMUSCular route once for 1 dose. 1 Vial 0    acetaminophen-codeine (TYLENOL #3) 300-30 mg per tablet Take 1 Tab by mouth every four (4) hours as needed for Pain. Max Daily Amount: 6 Tabs. 20 Tab 0    azaTHIOprine (IMURAN) 50 mg tablet Take 50 mg by mouth three (3) times daily.  buPROPion XL (WELLBUTRIN XL) 150 mg tablet Take 300 mg by mouth every morning. Social History     Social History    Marital status: SINGLE     Spouse name: N/A    Number of children: N/A    Years of education: N/A     Occupational History    Not on file. Social History Main Topics    Smoking status: Current Some Day Smoker     Packs/day: 0.25    Smokeless tobacco: Never Used    Alcohol use No    Drug use: No    Sexual activity: Not Currently     Other Topics Concern    Not on file     Social History Narrative       Prior to Admission Medications   Prescriptions Last Dose Informant Patient Reported? Taking?   acetaminophen-codeine (TYLENOL #3) 300-30 mg per tablet   No No   Sig: Take 1 Tab by mouth every four (4) hours as needed for Pain. Max Daily Amount: 6 Tabs. azaTHIOprine (IMURAN) 50 mg tablet   Yes No   Sig: Take 50 mg by mouth three (3) times daily. buPROPion XL (WELLBUTRIN XL) 150 mg tablet   Yes No   Sig: Take 300 mg by mouth every morning. cefTRIAXone (ROCEPHIN) 1 gram injection   No No   Si g by IntraMUSCular route once for 1 dose. ketorolac tromethamine (TORADOL) 60 mg/2 mL soln   No No   Si mL by IntraMUSCular route once for 1 dose. predniSONE (DELTASONE) 20 mg tablet   No No   Sig: Take 3 Tabs by mouth daily (with breakfast) for 7 days. Facility-Administered Medications: None       History reviewed. No pertinent family history. Review of Systems   Constitutional: Positive for fever. Eyes: Positive for double vision. Respiratory: Negative for cough and shortness of breath. Cardiovascular: Negative for chest pain. Gastrointestinal: Negative. Genitourinary: Negative. Musculoskeletal: Positive for joint pain (left ankle). Skin: Negative. Neurological: Positive for sensory change, focal weakness (chronic) and headaches. Negative for loss of consciousness. Endo/Heme/Allergies: Negative. Psychiatric/Behavioral: Negative. Physical Exam:       Visit Vitals    /70    Pulse 89    Temp (!) 101.7 °F (38.7 °C)    Resp 19    SpO2 100%     Physical Exam   Constitutional: He appears well-developed and well-nourished. He appears lethargic. He appears ill. No distress. HENT:   Head: Normocephalic and atraumatic. Eyes: Conjunctivae are normal. No scleral icterus. Neck: Decreased range of motion (unable to flex neck ) present. Cardiovascular: Normal rate, regular rhythm and normal heart sounds. No murmur heard. Pulmonary/Chest: Effort normal and breath sounds normal. No respiratory distress. He has no wheezes. He has no rales. He exhibits no tenderness. Abdominal: Soft. Bowel sounds are normal. He exhibits no distension. There is no tenderness. There is no guarding. Musculoskeletal: He exhibits tenderness (lt ankle). He exhibits no edema. Neurological: He appears lethargic. GCS eye subscore is 4. GCS verbal subscore is 5. GCS motor subscore is 6. Skin: Skin is warm. No rash noted. He is not diaphoretic. No erythema. Ancillary Studies: All lab and imaging reviewed for the past 24 hours.     Recent Results (from the past 24 hour(s))   AMB POC RAPID INFLUENZA TEST    Collection Time: 03/02/17 10:13 AM   Result Value Ref Range    VALID INTERNAL CONTROL POC Yes     QuickVue Influenza test Negative Negative   CBC WITH AUTOMATED DIFF    Collection Time: 03/02/17  4:04 PM   Result Value Ref Range    WBC 12.4 4.6 - 13.2 K/uL    RBC 4.36 (L) 4.70 - 5.50 M/uL    HGB 11.5 (L) 13.0 - 16.0 g/dL    HCT 36.0 36.0 - 48.0 %    MCV 82.6 74.0 - 97.0 FL    MCH 26.4 24.0 - 34.0 PG    MCHC 31.9 31.0 - 37.0 g/dL    RDW 17.2 (H) 11.6 - 14.5 %    PLATELET 354 853 - 818 K/uL    MPV 9.7 9.2 - 11.8 FL    NEUTROPHILS 73 40 - 73 %    LYMPHOCYTES 17 (L) 21 - 52 %    MONOCYTES 9 3 - 10 %    EOSINOPHILS 1 0 - 5 %    BASOPHILS 0 0 - 2 %    ABS. NEUTROPHILS 9.2 (H) 1.8 - 8.0 K/UL    ABS. LYMPHOCYTES 2.1 0.9 - 3.6 K/UL    ABS. MONOCYTES 1.1 0.05 - 1.2 K/UL    ABS. EOSINOPHILS 0.1 0.0 - 0.4 K/UL    ABS.  BASOPHILS 0.0 0.0 - 0.06 K/UL    DF AUTOMATED     HEMOGLOBIN A1C WITH EAG    Collection Time: 03/02/17  4:04 PM   Result Value Ref Range    Hemoglobin A1c 5.0 4.2 - 5.6 %    Est. average glucose 97 mg/dL   TSH 3RD GENERATION    Collection Time: 03/02/17  4:04 PM   Result Value Ref Range    TSH 0.28 (L) 0.36 - 3.74 uIU/mL   RPR    Collection Time: 03/02/17  4:04 PM   Result Value Ref Range    RPR NONREACTIVE NR     VITAMIN B12    Collection Time: 03/02/17  4:04 PM   Result Value Ref Range    Vitamin B12 229 211 - 911 pg/mL   FOLATE    Collection Time: 03/02/17  4:04 PM   Result Value Ref Range    Folate 17.8 (H) 3.10 - 17.50 ng/mL   INFLUENZA A & B AG (RAPID TEST)    Collection Time: 03/02/17  4:24 PM   Result Value Ref Range    Influenza A Antigen NEGATIVE  NEG      Influenza B Antigen NEGATIVE  NEG     URINALYSIS W/ RFLX MICROSCOPIC    Collection Time: 03/02/17  4:26 PM   Result Value Ref Range    Color YELLOW      Appearance CLOUDY      Specific gravity <1.005 (L) 1.005 - 1.030    pH (UA) 6.5 5.0 - 8.0      Protein NEGATIVE  NEG mg/dL    Glucose NEGATIVE  NEG mg/dL    Ketone NEGATIVE  NEG mg/dL    Bilirubin NEGATIVE  NEG      Blood SMALL (A) NEG      Urobilinogen 0.2 0.2 - 1.0 EU/dL    Nitrites NEGATIVE  NEG      Leukocyte Esterase NEGATIVE NEG     URINE MICROSCOPIC ONLY    Collection Time: 03/02/17  4:26 PM   Result Value Ref Range    WBC 0 to 2 0 - 4 /hpf    RBC 0 to 2 0 - 5 /hpf    Epithelial cells FEW 0 - 5 /lpf    Bacteria NEGATIVE  NEG /hpf    Mucus 2+ (A) NEG /lpf   METABOLIC PANEL, COMPREHENSIVE    Collection Time: 03/02/17  4:50 PM   Result Value Ref Range    Sodium 136 136 - 145 mmol/L    Potassium 3.8 3.5 - 5.5 mmol/L    Chloride 98 (L) 100 - 108 mmol/L    CO2 29 21 - 32 mmol/L    Anion gap 9 3.0 - 18 mmol/L    Glucose 90 74 - 99 mg/dL    BUN 8 7.0 - 18 MG/DL    Creatinine 0.88 0.6 - 1.3 MG/DL    BUN/Creatinine ratio 9 (L) 12 - 20      GFR est AA >60 >60 ml/min/1.73m2    GFR est non-AA >60 >60 ml/min/1.73m2    Calcium 7.7 (L) 8.5 - 10.1 MG/DL    Bilirubin, total 0.6 0.2 - 1.0 MG/DL    ALT (SGPT) 18 16 - 61 U/L    AST (SGOT) 21 15 - 37 U/L    Alk. phosphatase 85 45 - 117 U/L    Protein, total 7.3 6.4 - 8.2 g/dL    Albumin 3.3 (L) 3.4 - 5.0 g/dL    Globulin 4.0 2.0 - 4.0 g/dL    A-G Ratio 0.8 0.8 - 1.7     CARDIAC PANEL,(CK, CKMB & TROPONIN)    Collection Time: 03/02/17  4:50 PM   Result Value Ref Range     (H) 39 - 308 U/L    CK - MB 1.3 <3.6 ng/ml    CK-MB Index 0.1 0.0 - 4.0 %    Troponin-I, Qt. <0.02 0.0 - 0.045 NG/ML   T4, FREE    Collection Time: 03/02/17  4:50 PM   Result Value Ref Range    T4, Free 0.8 0.7 - 1.5 NG/DL   POC LACTIC ACID    Collection Time: 03/02/17  4:55 PM   Result Value Ref Range    Lactic Acid (POC) 0.5 0.4 - 2.0 mmol/L   PROTEIN, CSF    Collection Time: 03/02/17  8:20 PM   Result Value Ref Range    Tube No. 1      Protein, (H) 15 - 45 MG/DL   GLUCOSE, CSF    Collection Time: 03/02/17  8:20 PM   Result Value Ref Range    Tube No. 1      Glucose,CSF 46 40 - 70 MG/DL   CULTURE, CSF W GRAM STAIN    Collection Time: 03/02/17  8:20 PM   Result Value Ref Range    Special Requests: TUBE 2, CULTURE AND SENSITIVTY AND GRAM STAIN.      GRAM STAIN MODERATE  WBC'S        GRAM STAIN NO ORGANISMS SEEN      GRAM STAIN SMEAR CALLED TO BHAVIN HERNANDEZ RN IN ER 3/2/17 AT 2205 TO Teresita 3    Culture result: PENDING    CELL COUNT, CSF    Collection Time: 03/02/17  8:20 PM   Result Value Ref Range    CSF TUBE NO. 3      CSF COLOR COLORLESS      CSF APPEARANCE HAZY      CSF RBCS 8 (H) 0 /cu mm    CSF WBCS 678 (H) <5 /cu mm    CSF SEGS 83 (H) 0 - 6 %    CSF LYMPH 5 %    CSF MONO 12 %   CRYPTOCOCCAL AG, CSF W/REFLEX TITER    Collection Time: 03/02/17  8:20 PM   Result Value Ref Range    Cryptococcus Ag, CSF NEGATIVE          Assessment/Plan     Active Problems:    Meningitis (3/2/2017)      Fever  Red man syndrome  Depression   Neurosarcoidosis  Left Hemiparesis   Hx stroke     PLAN:    Meningitis   - CSF analysis noted.  WBC and protien  Elevated suspect bacterial origin   - CSF gram stain, CSF culture and blood culture ordered  - Continue IV Vancomycin q12,IV  Rocephin q12 and IV Acyclovir q8 pending lab results  - Prednisone daily  - Air droplet Isolation for 24 hours from time of antibiotic treatment  - Neuro and vitals sign check   - Pain management  - Recommend ID consult in AM    Fever   - 2/2 above   - Tylenol as needed  - Blood and CSF culture pending  - HIV, Lyme, Oligoclonal     Red man syndrome   - Following Vancomycin infusion  - Will stop give Benadryl, ranitidine and when it resolves will restart Vancomycin and 1/2 infusion rate     Depression  - Home Wellbutrin    Neurosarcoidosis  - Prednisone every day   - Recommend Neurology consult in AM  - Patient not taking Imuran     Left Hemiparesis   - residual from stroke vs neurosarcoidosis  - PT/OT     DVT Prophylaxis - Lovenox  GI Prophylaxis - Protonix  IVF  CC./HR  Full code     Sulema García MD  3/2/2017  9:49 PM

## 2017-03-03 NOTE — CONSULTS
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 Infectious vs sec to neuro-sarcoidosis - Will continue on current CNS dose Abx for now with Ceftriaxone, Vanco and Acyclovir  - I don't see any CSF HSV PCR or AFB (at risk given h/o Imuran)- checked with lab and not ordered and will add to CSf sample in lab  - Pt did receive 1 dose of CFT at PCP office and which can account for negative cultures if other work up non diagnostic  - consider neuro consult as very likely can be sec to Neurosarcoidosis  - DC droplet precautions per protocol     Leucocytosis - Monitor   Fever - Improving   Neuro-Sarcoidosis- unclear if took steroids on regular basis or not  - Was prescribed imuran but non compliant    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 I will ask Dr Rigoberto Koo (on call) to check results/labs electronically. She can be reached any time at [phone] if any problem or question for ID. HPI:     Mr Verneita Schirmer. is a 39 y.o. AAM with pmhx of neurosarcoidosis, depression, left hemiparesis, meningitis, stroke who presented to ED 3/2 with c/o headache which per him started 3 weeks ago.  Pt is very drowsy and unable to provide detailed history. Patient's mother reported him with headaches for 3 days and didn't relieve with Advil. Patient saw his PCP 1 day prior to admission and was given Rocephin IV and Toradol. Patient has associated gait problem, diplopia, neck pain and developed high grade Fever of 101. He also c/o ankle pain post fall. In ED patient was seen by tele neurology. CT head was negative for acute intracranial abnormality with recommendation for a follow up MRI brain. MRI brain also did not show any definite restricted diffusion signals to suggest stroke. Patient had a LP and CSF analysis showed CSF protein 103, CSF , CSF glucose 46, CSF RBC 8. CSF  gram stain and fungal stain was negative. Ua was negative. Pt remained very sleepy and drowsy. Patient was started on Rocephin IV, Vancomycin IV and Acyclovir. Patient developed Red man Syndrome following Vancomycin infusion and it was stopped and patient was given benadryl, Ranitidine. He was monitored on floor but he remained very drowsy. We were asked for further eval and management.      Past medical history:     Past Medical History:   Diagnosis Date    Arm pain     CV[facility]cerebro[facility]) ([facility] [address] 2012    Meningitis     Meningitis     [facility]       Social History:     Social History     Social History    Marital status: SINGLE     Spouse name: N/A    Number of children: N/A    Years of education: N/A     Occupational History    Not on file. Social History Main Topics    Smoking status: Current Some Day Smoker     Packs/day: 0.25    Smokeless tobacco: Never Used    Alcohol use No    Drug use: No    Sexual activity: Not Currently     Other Topics Concern    Not on file     Social History Narrative       Family History:   History reviewed. No pertinent family history.     Allergies:   No Known Allergies      Home Medications:     Prescriptions Prior to Admission   Medication Sig    [] cefTRIAXone (ROCEPHIN) 1 gram injection 1 g by IntraMUSCular route once for 1 dose.  predniSONE (DELTASONE) 20 mg tablet Take 3 Tabs by mouth daily (with breakfast) for 7 days.  [] ketorolac tromethamine (TORADOL) 60 mg/2 mL soln 2 mL by IntraMUSCular route once for 1 dose.  acetaminophen-codeine (TYLENOL #3) 300-30 mg per tablet Take 1 Tab by mouth every four (4) hours as needed for Pain. Max Daily Amount: 6 Tabs.  azaTHIOprine (IMURAN) 50 mg tablet Take 50 mg by mouth three (3) times daily.  buPROPion XL (WELLBUTRIN XL) 150 mg tablet Take 300 mg by mouth every morning. Current Medications:     Current Facility-Administered Medications   Medication Dose Route Frequency    predniSONE (DELTASONE) tablet 60 mg  60 mg Oral DAILY WITH BREAKFAST    pantoprazole (PROTONIX) 40 mg in sodium chloride 0.9 % 10 mL injection  40 mg IntraVENous DAILY    acetaminophen (TYLENOL) tablet 650 mg  650 mg Oral Q4H PRN    enoxaparin (LOVENOX) injection 40 mg  40 mg SubCUTAneous Q24H    buPROPion XL (WELLBUTRIN XL) tablet 300 mg  300 mg Oral 7am    VANCOMYCIN INFORMATION NOTE   Other CONTINUOUS    vancomycin (VANCOCIN) 1,500 mg in 0.9% sodium chloride 500 mL IVPB  1,500 mg IntraVENous Q8H    [START ON 3/4/2017] VANCOMYCIN INFORMATION NOTE   Other ONCE    0.9% sodium chloride infusion  150 mL/hr IntraVENous CONTINUOUS    cefTRIAXone (ROCEPHIN) 2 g in 0.9% sodium chloride (MBP/ADV) 50 mL MBP  2 g IntraVENous Q12H    acyclovir (ZOVIRAX) 798 mg in 0.9% sodium chloride 250 mL IVPB  10 mg/kg IntraVENous Q8H       Review of Systems:   12 points ROS attempted though very difficult as pt very drowsy and sleepy. Constitutional: +ve for fever, NO chills, diaphoresis and unexpected weight change. HENT: Negative for ear pain, congestion, sore throat, rhinorrhea. Eyes: Negative for pain, redness and visual disturbance. Respiratory: negative for shortness of breath, cough, chest tightness, wheezing.    Cardiovascular: Negative for chest pain, palpitations and leg swelling. Gastrointestinal: Negative for nausea, vomiting, abdominal pain or diarrhea  Genitourinary: Negative for dysuria   Musculoskeletal: Negative for back pain, +ve ankle pain, no muscle aches   Skin: Negative for ulcers, rash  Neurological: +ve falls, Headaches and neck pains   Hematological:Negative for easy bruising or bleeding. Psychiatric/Behavioral: Negative anxiety, depression currently. Physical Exam:  Vitals  Temp (24hrs), Av.8 °F (37.1 °C), Min:97.4 °F (36.3 °C), Max:101.7 °F (38.7 °C)    Visit Vitals    /78 (BP 1 Location: Left arm, BP Patient Position: At rest)    Pulse 67    Temp 97.9 °F (36.6 °C)    Resp 14    Ht 6' 2\" (1.88 m)    Wt 79.8 kg (176 lb)    SpO2 95%    BMI 22.6 kg/m2       General: Well-developed, 39y.o. year-old, male, in no acute distress but very sleepy  HEENT: Normocephalic, anicteric sclerae, Pupils equal, round reactive to light, no oropharyngeal lesions. No sinus tenderness. Neck:  Supple, no lymphadenopathy, masses or thyromegaly  Chest:  Symmetrical expansion  Lungs:  Clear to auscultation bilaterally, no dullness  Heart:  Regular rhythm, no murmurs, rubs or gallops, No JVD  Abdomen: Soft, non-tender,non distended, no organomegaly, BS+  Musculoskeletal: No edema. No clubbing or cyanosis  CNS:               Sleepy but arousable. No NR. Left hemiparesis but limited exam given mental status  SKIN:               No skin lesion or rash.  Dry, warm, intact          Labs: Results:   Chemistry Recent Labs      17   1650   GLU  90   NA  136   K  3.8   CL  98*   CO2  29   BUN  8   CREA  0.88   CA  7.7*   AGAP  9   BUCR  9*   AP  85   TP  7.3   ALB  3.3*   GLOB  4.0   AGRAT  0.8      CBC w/Diff Recent Labs      17   1604   WBC  12.4   RBC  4.36*   HGB  11.5*   HCT  36.0   PLT  251   GRANS  73   LYMPH  17*   EOS  1      Microbiology Recent Labs      17   1634  17   1604   CULT PENDING  PENDING  NO GROWTH AFTER 16 HOURS  NO GROWTH AFTER 16 HOURS          Imaging-      Results from Hospital Encounter encounter on 03/02/17   XR CHEST PORT   Narrative CHEST AP PORTABLE    Indication: Chest pain. Comparison: None. Findings: The lungs appear clear. No evidence for pneumothorax or pleural  effusion. Cardiac silhouette and pulmonary vascularity appear within normal  limits. Impression IMPRESSION:    No acute cardiopulmonary disease. Results from [place] encounter on 03/01/17   CT HEAD WO CONT   Addendum Addendum:   One or more dose reduction techniques were used on this CT: automated  exposure control, adjustment of the mAs and/or kVp according to patient's size, and iterative reconstruction techniques. The specific techniques utilized on  this CT exam have been documented in the patient's electronic medical record. Laurel Cohn MD 3/1/2017  3:40 PM             Narrative CT HEAD WITHOUT CONTRAST    History:New onset of diplopia possible acute stroke history of neurosarcoid    CT Technique:    Serial axial noncontrast head CT was performed from base of skull to vertex. Comparison:  No prior exam available. CT Findings: There are several subtle small nodular foci of hypodensity somewhat poorly  defined within the central gray primarily in the right thalamus and adjacent  genu and posterior limb internal capsule possibly also minimally in the left  thalamus    Brain parenchyma appears of normal density without discrete hematoma, extra  axial collection, focal mass effect, or midline shift. Cerebral sulci and ventricles appear within normal limits in size and  configuration for patient age. Visible bony calvarium appears grossly unremarkable. Impression IMPRESSION:    1. No prior exams.  Several small poorly defined foci of hypodensity, central  gray asymmetrically bilaterally, primarily in right thalamus and adjacent  posterior limb internal capsule, may represent lacunar infarcts, age  indeterminate. Recommend contrast enhanced MRI head for clarification. No  evident acute hemorrhage or focal mass effect.         ---------------------------------------------------------------------------------------------------------------  I have independently examined the patient and reviewed all lab studies and imgaing as well as review of nursing notes and physican notes from the past 24 hours. The plan of care has been discussed with the patient/relative and all questions are answered.        Tae Richardson MD  3/3/2017    [facility] AT THE [facility]  [phone]

## 2017-03-03 NOTE — PROGRESS NOTES
Patient has designated _______mother_________________ to participate in his/her discharge plan and to receive any needed information.      Name: Connor Roque  Address: [facility]  Phone [phone]

## 2017-03-03 NOTE — ED NOTES
Called to pt's bedside for suspected reaction to Vancomyacin. Pt appeared to have a \"red man syndrome\" type reaction. Primary RN discontinued infusion. ED attending notified and placed orders for benadryl and Pepcid. Admitting physician made aware. Agrees with Red Man syndrome. Gave orders to restart infusion in 1 hour at half the ordered rate. Primary RN assisted at bedside with medication administration and monitoring.

## 2017-03-03 NOTE — PROGRESS NOTES
Daily Progress Note: 3/3/2017 8:06 AM   Admit Date: 3/2/2017    Patient seen in follow up for multiple medical problems as listed below:  Patient Active Problem List   Diagnosis Code    Neurosarcoidosis ([facility] [address] D86.89    Left hemiparesis ([facility] [address] G81.94    Advance care planning Z71.89    Meningitis G03.9       Assesment     39 y.o. male with pmhx of neurosarcoidosis, depression, left hemiparesis, meningitis, stroke who presents with headache onset 3 days. LP suggestive of infectious process bacterial vs viral .  LP and CSF analysis with CSF protein 103, CSF , CSF glucose 46, CSF RBC 8. CSF culture and gram stain and Blood culture are pending. Patient was started on Rocephin IV, Vancomycin IV and Acyclovir    Meningitis   - CSF:WBC and protien Elevated, low normal glucose suspect bacterial origin   - CSF gram stain, CSF culture and blood culture pending  - Continue IV Vancomycin q12,IV  Rocephin q12 and IV Acyclovir q8 pending lab results  - Prednisone daily  - Air droplet Isolation for 24 hours from time of antibiotic treatment  - Neuro and vitals sign check   - consider ID consult   DDx to include HA with neurosarcoidosis, MRI head pending for any further pathology or encephalitis      Fever   - 2/2 above   - Tylenol as needed  - Blood and CSF culture pending  - HIV, Lyme, Oligoclonal      Red man syndrome   - Following Vancomycin infusion  - Will stop give Benadryl, ranitidine and when it resolves will restart Vancomycin and 1/2 infusion rate      Depression  - Home Wellbutrin     Neurosarcoidosis  - Prednisone every day. Pt poor historian regarding this disease.   - Patient not taking Imuran. Unclear what prior symptoms the patient has had or treatment plans. Need prior Neuro notes     Left Hemiparesis   - residual from stroke vs neurosarcoidosis  - PT/OT     DVT Protocol Active: yes  Code Status:  Full Code     Disposition: Req 48h hospitalization    Subjective:     CC:  Headache; Gait Problem; and Fall    Interval History: No improvement in headache. Patient has little information about his prior neurosarcoidosis. No neck pain or stifness. No weakness/numbness/tingling       Objective:     Visit Vitals    /76 (BP 1 Location: Left arm, BP Patient Position: At rest)    Pulse 69    Temp 97.4 °F (36.3 °C)    Resp 14    Ht 6' 2\" (1.88 m)    Wt 79.8 kg (176 lb)    SpO2 92%    BMI 22.6 kg/m2       Temp (24hrs), Av.4 °F (37.4 °C), Min:97.4 °F (36.3 °C), Max:101.7 °F (38.7 °C)      No intake or output data in the 24 hours ending 17 0806    Gen: AOx3, NAD  HEENT:  ERLIN, EOMI. Neck: No Bruits/JVD. No neck tenderness or stiffness   Lungs:   CTAB. Good respiratory effort  Heart:   RR S1 S2 without M/R/G  Abdomen: ND,NT, BSX4,   Extremities:   No LE edema. No cyanosis. Skin:  no jaundice/lesions  Neuro: CN2-12 intact.  DTR 2/4 pretibial, normal UE/LE strength and sensation      Data Review:     Meds/Labs/Tests reviewed    Current Shift:     Last three shifts:     Recent Labs      17   1604   WBC  12.4   RBC  4.36*   HGB  11.5*   HCT  36.0   PLT  251   GRANS  73   LYMPH  17*   EOS  1       Recent Labs      17   1650   BUN  8   CREA  0.88   CA  7.7*   ALB  3.3*   K  3.8   NA  136   CL  98*   CO2  29   GLU  90        Lab Results   Component Value Date/Time    Glucose 90 2017 04:50 PM          Care coordination with Nursing/Consultants/staff: 5  Prior history, labs, and charting reviewed: 15    Procedures/Imaging:  CT head 3/1  pCXR 3/2  MRI brain 3/2 -p        Total time spent with chart review, patient examination/education, discussion with staff on case,documentation and medication management / adjustment  :  30 Minutes      Dr Neyda Chandler  Pager: [phone]

## 2017-03-03 NOTE — CONSULTS
Neurology Consult Note      Patient: Poornima Zaidi. MRN: [id number]  SSN: [id number]    YOB: 1980  Age: 39 y.o. Sex: male      Subjective:      Poornima Zaidi. is a 39 y.o. male who is being seen for possible meningitis. He was poor historian so I spoke with his private neurologist Dr. Torrey Rutherford and I reviewed all available records from [facility] ( scanned under media) . 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 From 2014 records he was found to have brainstem inflammatory process that could not be biopsied. Body scans did not identify any other signs of sarcoid. Cervical MRI was normal. CSF cytology was normal as well as CSF ACE level. Anywya, since Monday he developed headaches and diplopia and worsening gait so eventually presented to ER. Had abnormal LP results as well as abnormal MRI which I reviewed with increase T2/FLAIR lesion in the left midbrain/thalamus with some contrast enhancement. Past Medical History:   Diagnosis Date    Arm pain     [facility]A (cerebrovascular accident) ([facility] [address] 2012    Meningitis     Meningitis     Neurosarcoidosis ([facility] [address] 2012     Past Surgical History:   Procedure Laterality Date    HX HIP REPLACEMENT  2010    HX WISDOM TEETH EXTRACTION        History reviewed. No pertinent family history.   Social History   Substance Use Topics    Smoking status: Current Some Day Smoker     Packs/day: 0.25    Smokeless tobacco: Never Used    Alcohol use No      Current Facility-Administered Medications   Medication Dose Route Frequency Provider Last Rate Last Dose    predniSONE (DELTASONE) tablet 60 mg  60 mg Oral DAILY WITH BREAKFAST Efrain Goyal MD   60 mg at 03/03/17 1026    pantoprazole (PROTONIX) 40 mg in sodium chloride 0.9 % 10 mL injection 40 mg IntraVENous DAILY Yanelis Jacobson MD   Stopped at 03/03/17 0200    acetaminophen (TYLENOL) tablet 650 mg  650 mg Oral Q4H PRN Efrain Romero MD        enoxaparin (LOVENOX) injection 40 mg  40 mg SubCUTAneous Q24H Efrain Romero MD   40 mg at 03/03/17 0226    buPROPion XL (WELLBUTRIN XL) tablet 300 mg  300 mg Oral 7am Yanelis Jacobson MD   300 mg at 03/03/17 1026    VANCOMYCIN INFORMATION NOTE   Other CONTINUOUS Yanelis Jacobson MD        vancomycin (VANCOCIN) 1,500 mg in 0.9% sodium chloride 500 mL IVPB  1,500 mg IntraVENous Q8H Ashley Kan  mL/hr at 03/03/17 1337 1,500 mg at 03/03/17 1337    [START ON 3/4/2017] VANCOMYCIN INFORMATION NOTE   Other [facility], DO        0.9% sodium chloride infusion  150 mL/hr IntraVENous CONTINUOUS Noel Brown  mL/hr at 03/03/17 1200 150 mL/hr at 03/03/17 1200    cefTRIAXone (ROCEPHIN) 2 g in 0.9% sodium chloride (MBP/ADV) 50 mL MBP  2 g IntraVENous Q12H Perfecto Miguel  mL/hr at 03/03/17 1025 2 g at 03/03/17 1025    acyclovir (ZOVIRAX) 798 mg in 0.9% sodium chloride 250 mL IVPB  10 mg/kg IntraVENous Q8H Perfecto Miguel MD   798 mg at 03/03/17 1201        No Known Allergies    Labs:     Collection Time: 03/02/17  4:50 PM   Result Value Ref Range     (H) 39 - 308 U/L    CK - MB 1.3 <3.6 ng/ml    CK-MB Index 0.1 0.0 - 4.0 %    Troponin-I, Qt. <0.02 0.0 - 0.045 NG/ML   T4, FREE    Collection Time: 03/02/17  4:50 PM   Result Value Ref Range    T4, Free 0.8 0.7 - 1.5 NG/DL   VITAMIN B12 & FOLATE    Collection Time: 03/02/17  4:50 PM   Result Value Ref Range    Vitamin B12 232 211 - 911 pg/mL    Folate 16.1 3.10 - 17.50 ng/mL   POC LACTIC ACID    Collection Time: 03/02/17  4:55 PM   Result Value Ref Range    Lactic Acid (POC) 0.5 0.4 - 2.0 mmol/L   PROTEIN, CSF    Collection Time: 03/02/17  8:20 PM   Result Value Ref Range    Tube No. 1      Protein, (H) 15 - 45 MG/DL   GLUCOSE, CSF    Collection Time: 03/02/17  8:20 PM   Result Value Ref Range    Tube No. 1      Glucose,CSF 46 40 - 70 MG/DL   CULTURE, CSF W GRAM STAIN    Collection Time: 03/02/17  8:20 PM   Result Value Ref Range    Special Requests: TUBE 2, CULTURE AND SENSITIVTY AND GRAM STAIN. GRAM STAIN MODERATE  WBC'S        GRAM STAIN NO ORGANISMS SEEN      GRAM STAIN       SMEAR CALLED TO BHAVIN HERNANDEZ RN IN ER 3/2/17 AT 2205 TO [address] 3    Culture result: NO GROWTH AFTER 14 HOURS     CELL COUNT, CSF    Collection Time: 03/02/17  8:20 PM   Result Value Ref Range    CSF TUBE NO. 3      CSF COLOR COLORLESS      CSF APPEARANCE HAZY      CSF RBCS 8 (H) 0 /cu mm    CSF WBCS 678 (H) <5 /cu mm    CSF SEGS 83 (H) 0 - 6 %    CSF LYMPH 5 %    CSF MONO 12 %   CULTURE, FUNGUS    Collection Time: 03/02/17  8:20 PM   Result Value Ref Range    Special Requests: NO SPECIAL REQUESTS      FUNGUS SMEAR NO FUNGAL ELEMENTS SEEN      Culture result: PENDING    CRYPTOCOCCAL AG, CSF W/REFLEX TITER    Collection Time: 03/02/17  8:20 PM   Result Value Ref Range    Cryptococcus Ag, CSF NEGATIVE      BACTERIAL AG PANEL    Collection Time: 03/02/17  8:20 PM   Result Value Ref Range    Site CEREBROSPINAL FLUID      H. influenza, Grp B NEGATIVE  NEG      Strep. pneumoniae NEGATIVE  NEG      N. meningitidis A/Y NEGATIVE  NEG      Group B Strep NEGATIVE  NEG      N. men., grp S W135 NEGATIVE  NEG      N. men., grp B/E. Coli NEGATIVE  NEG     HIV 1/2 AB SCREEN W RFLX CONFIRM    Collection Time: 03/02/17 10:35 PM   Result Value Ref Range    HIV 1/2 Interpretation NONREACTIVE NR      HIV 1/2 result comment (NOTE)      MRI Results (most recent):    Results from [place] encounter on 03/02/17   MRI BRAIN W WO CONT     Impression Impression:    1.  Abnormal edematous signal changes with associated mild expansion involvingleft thalamus and left midbrain with ventral peripheral enhancement, withoutrestricted diffusion signal. Given history of prior neurosarcoidosis, thiscertainly could represent parenchymal neurosarcoidosis. Differential diagnosis  includes tumefactive demyelination, with no other evidence of multiple  sclerosis. Less likely possibility includes neoplasm. Further evaluationrecommended with CSF analysis and follow-up MR imaging following treatment. Initial report was given by the radiologist on call at 8:00 PM 3/2/2017.    2. Associated narrowing of the third ventricle and upper aqueduct due to leftthalamic and midbrain process, without hydrocephalus. 3. Chronic right thalamic lacunar infarct and nonspecific upper pontine signal changes, possible sequela of previous chronic microvascular disease ordemyelination. Chronic findings within right thalamus could also representsequela of remote involvement of previous neurosarcoidosis.         Review of Systems:    Y  N   Constitutional: [] [] recent weight change  [x] [] fever  [] [] sleep difficulties   ENT/Mouth:  [] [] hearing loss  [] [] swallowing problems  [x] [] slurred speech   Cardiovascular:  [] [x] chest pain   [] [] palpitations    Respiratory: [] [x] cough with swallow  [] [] shortness of breath  [] [] sleep apnea  [] [] intubated   Gastrointestinal: [] [x] abdominal pain  [] [] nausea   Genitourinary: [] [x] frequent urination  [] [] incontinence    Musculoskeletal:   [x] [] joint pain  [] [] muscle pain   Integument:   [] [x] rash/itching   Neurological:  [] [] dizziness/vertigo  [] [] sedation  [] [] confusion  [] [] agitation/combativeness  [] [] loss of consciousness  [] [] numbness/tingling sensation  [] [] tremors  [x] [] weakness in limbs  [] [] difficulty with balance  [] [] frequent or recurring headaches  [] [] memory loss   [] [] comatose  [] [] seizures   Psychiatric:   [] [] depression  [] [x] hallucinations   Endocrine: [] [x] excessive thirst or urination   [] [] heat or cold intolerance   Hematologic/Lymphatic: [] [] bleeding tendency  [] [] enlarged lymph nodes Objective:     Vitals:    03/03/17 0324 03/03/17 0745 03/03/17 0809 03/03/17 1130   BP: 144/76  144/77 133/78   Pulse: 69  60 67   Resp: 14  13 14   Temp: 97.4 °F (36.3 °C)  97.7 °F (36.5 °C) 97.9 °F (36.6 °C)   SpO2: 92%  94% 95%   Weight:  79.8 kg (176 lb)     Height:  6' 2\" (1.88 m)          Physical Exam:  {Neurological examination:   Mental status examination: Awake, oriented times three, speech mildly dysarthic. Cranial nerves:   Optic nerve: would not fix to check his Findi , was able to count digits in either eye separately. Oculomotor, Trochlear and abducens nerves: Pupils 3 mm reactive, gaze is dysconjugate, has scew deviation with the right eye medially and down deviation with difficult with upgaze but better down gaze. Able to full abduct in both eyes. No ptosis but tended to close left eye to avoid diplopia. Trigeminal nerve: facial sensation normal bilaterally, normal movements of the jaw. Normal corneal reflexes. Facial nerve: No facial weakness was present. Vestibuloacoustic nerve: hearing is normal bilaterally. Glossopharyngeal and Vagus nerves: Soft palate elevation is normal. Gag reflex is normal.   Spinal accessory nerve: shoulder shrug and head turn strength is normal.   Hypoglossal nerve: tongue movements normal.   Motor exam: Strength was above gravity in all extremities. Has increase tone in the left arm with hand posturing. Sensory: Reasonable pinprick sensation. Coordination: Has dysmetria in both arms. Deep tendon reflexes: 1+ in bilateral biceps, triceps, brachioradialis, patellar, trace ankle reflexes. Left side Babinski, right toe downgoing.    Gait: Deferred  Assessment/Plan:     Hospital Problems  Date Reviewed: 3/2/2017          Codes Class Noted POA    Headache ICD-10-CM: R51  ICD-9-CM: 784.0  3/3/2017 Unknown        * (Principal)Meningitis ICD-10-CM: G03.9  ICD-9-CM: 322.9  3/2/2017 Unknown        Neurosarcoidosis ([facility] ICD-10-CM: P49.06  ICD-9-CM: 980 10/17/2016 Yes              Historically he was treated for bacterial meningitis in 2011 but also for neurosarcoidosis in 2014. So I will say to continue treating him for bacterial meningitis since his CSF leukocytosis is more than what we see with neurosarcoidosis and for his brainstem/thalamic inflammatory process will treat with Decadron 4 mg q 6 hours, and after recovery from his acute phase then he would need eventually to be maintained on Cellcept or other immunosuppression drugs for long term management. Alex Santiago He needs to be monitored closely in neuro-ICU for any worsening respiratory function that would required intubation/ventulation. B12 supplements. D/W Dr. [facility] Hospitalist to switch prednisone to Decadron. .      Signed By: Philomena Short MD     March 3, 2017

## 2017-03-03 NOTE — PROGRESS NOTES
Received accompanied by pt's mother. Pt lethargic but arousable. pt on droplet precautions for meningitis. Assisted with urinal as pt stated he has to urinate but unable to void at this time. Dual skin assessment done with vasquez Harris. Skin in tact. Pt wearing eye patch to left eye for double vision. 0200 Incontinent of urine,april care done,gown,underpad & linens changed. Pt awake & oriented to person,age,place,month & year. 0630 Incontinent of urine x 3 this shift. No bowel movement. Slept well. 0830 Pt placed on telemetry,box 44. NSR WITH PACS

## 2017-03-03 NOTE — ED NOTES
Called pharmacy to check on status of acyclovair. Per pharmacy, will call ED when medication is ready to be picked up from pharmacy.

## 2017-03-04 NOTE — PROGRESS NOTES
[facility] Dosing Services     Day of Therapy: 2     Other Antibiotics: Ceftriaxone 2g iv q12h     Labs:   Level: 17.7 (~9 hours post dose)  Bun/Serum Creatinine - 10/0.70  CrCl - 163.1 ml/min  WBC - 12.9      Cultures:   3/4/17 Blood x 2: NGTD x 2  3/4/17 CSF: NGTD x2  3/2/17 Fungus Smear: No fungal elements seen, culture pending    Plan:  Continue regimen 1.5g iv q8h. Check trough level on 3/7 Pharmacy to follow and will make changes to dose and/or frequency based on clinical status.

## 2017-03-04 NOTE — PROGRESS NOTES
Problem: Mobility Impaired (Adult and Pediatric)  Goal: *Acute Goals and Plan of Care (Insert Text)  Physical Therapy Goals  Initiated 3/3/2017 and to be accomplished within 7 day(s)  1. Patient will move from supine to sit and sit to supine , scoot up and down and roll side to side in bed with modified independence. 2. Patient will transfer from bed to chair and chair to bed with modified independence using the least restrictive device. 3. Patient will perform sit to stand with modified independence. 4. Patient will ambulate with supervision/set-up for 100 feet with the least restrictive device. 5. Patient will ascend/descend 14 stairs with handrail(s) with supervision/set-up. Outcome: Progressing Towards Goal  PHYSICAL THERAPY EVALUATION     Patient: Maxx Gómez (40 y.o. male)  Date: 3/3/2017  Primary Diagnosis: Meningitis  Meningitis        Precautions:   Fall, Contact, Other (comment) (droplet)      PROBLEM LIST:  Patient presents with the following problems:   Bed Mobility, Transfers, Gait, Strength, Range of Motion, Balance, Stairs, Precautions and safety awareness . ASSESSMENT :   Patient requires between minimal assistance/contact guard assist and moderate assistance  for bed mobility, transfers and ambulation. Patient seen with OT for maximum participation and safety . patient fatigues quickly and  Only able to  Complete evaluation with therapy. Left in bed with call light in reach  Had to provide a clean becca and left with urinal in reach. emptying 175cc CNA Anali notified. Education on plan of care and  importance of getting up verbalized understanding and needs reinforcement. Patient will benefit from skilled intervention to address the above impairments.   Patients rehabilitation potential is considered to be Fair  Factors which may influence rehabilitation potential include:   [ ]         None noted  [ ]         Mental ability/status  [X]         Medical condition  [ ] Home/family situation and support systems  [ ]         Safety awareness  [ ]         Pain tolerance/management  [ ]         Other:        PLAN :  Recommendations and Planned Interventions:  [X]           Bed Mobility Training             [X]    Neuromuscular Re-Education  [X]           Transfer Training                   [ ]    Orthotic/Prosthetic Training  [X]           Gait Training                          [ ]    Modalities  [X]           Therapeutic Exercises          [ ]    Edema Management/Control  [X]           Therapeutic Activities            [X]    Patient and Family Training/Education  [ ]           Other (comment):     Frequency/Duration: Patient will be followed by physical therapy 1-2 times per day/4-7 days per week to address goals. Discharge Recommendations: Rehab and Inpatient Rehab  Further Equipment Recommendations for Discharge: tbd prior to going home        SUBJECTIVE:   Patient stated .      OBJECTIVE DATA SUMMARY:       Past Medical History:   Diagnosis Date    Arm pain      CVA (cerebrovascular accident) ([facility] [address] 2012    Meningitis      Meningitis      Neurosarcoidosis ([facility] [address] 2012     Past Surgical History:   Procedure Laterality Date    HX HIP REPLACEMENT   2010    HX WISDOM TEETH EXTRACTION         Barriers to Learning/Limitations: yes;  sensory deficits-vision double at times has patch for eye   Compensate with: visual, verbal, tactile, kinesthetic cues/model  G CODES:Mobility  Current  CK= 40-59%   Goal  CI= 1-19%. The severity rating is based on the Other [employer] Balance Scale3/5   [employer] Balance Scale3/5  0: Pt performs 25% or less of standing activity (Max assist) CN, 100% impaired. 1: Pt supports self with upper extremities but requires therapist assistance. Pt performs 25-50% of effort (Mod assist) CM, 80% to <100% impaired. 1+: Pt supports self with upper extremities but requires therapist assistance.  Pt performs >50% effort. (Min assist). CL, 60% to <80% impaired. 2: Pt supports self independently with both upper extremities (walker, crutches, parallel bars). CL, 60% to <80% impaired. 2+: Pt support self independently with 1 upper extremity (cane, crutch, 1 parallel bar). CK, 40% to <60% impaired. 3: Pt stands without upper extremity support for up to 30 seconds. CK, 40% to <60% impaired. 3+: Pt stands without upper extremity support for 30 seconds or greater. CJ, 20% to <40% impaired. 4: Pt independently moves and returns center of gravity 1-2 inches in one plane. CJ, 20% to <40% impaired. 4+: Pt independently moves and returns center of gravity 1-2 inches in multiple planes. CI, 1% to <20% impaired. 5: Pt independently moves and returns center of gravity in all planes greater than 2 inches. CH, 0% impaired. Eval Complexity: History: HIGH Complexity :3+ comorbidities / personal factors will impact the outcome/ POC Exam:MEDIUM Complexity : 3 Standardized tests and measures addressing body structure, function, activity limitation and / or participation in recreation  Presentation: MEDIUM Complexity : Evolving with changing characteristics  Clinical Decision Making:Medium Complexity [facility] Standing Balance Scale3/5 Overall Complexity:MEDIUM  Prior Level of Function/Home Situation: needed assist with sock and used quad cane for ambulation at home   [address]: Private residence  # Steps to Enter: 0  One/Two Story Residence: Two story  Living Alone: No  Support Systems: Family member(s)  Patient Expects to be Discharged to[de-identified] Private residence  Current DME Used/Available at Home: Charley Grist, quad, Shower chair, Walker, rolling  Tub or Shower Type: Tub/Shower combination (has shower chair; no grab bars)  Critical Behavior:  Neurologic State: Alert  Orientation Level: Oriented X4  Cognition: Follows commands  Safety/Judgement: Awareness of environment; Fall prevention  Psychosocial  Patient Behaviors: Calm;Cooperative  Family  Behaviors: Calm; Cooperative  Purposeful Interaction: Yes  Pt Identified Daily Priority: Clinical issues (comment)  Caritas Process: Establish trust;Teaching/learning;Create healing environment; Attend basic human needs  Caring Interventions: Therapeutic modalities; Reassure  Reassure: Informing; Acceptance  Strength:    Strength: Generally decreased, functional (lle<rle, 3+/5 )  Tone & Sensation:   Tone: Abnormal ( left hand increaed flex in hand)  Sensation: Impaired  Range Of Motion:  AROM: Generally decreased, functional (ble's l< r in LE's )  PROM: Within functional limits  Functional Mobility:  Bed Mobility:  Rolling: Contact guard assistance; Additional time  Supine to Sit: Contact guard assistance; Additional time  Sit to Supine: Contact guard assistance; Additional time  Scooting: Contact guard assistance  Transfers:  Sit to Stand: Contact guard assistance;Minimum assistance  Stand to Sit: Contact guard assistance;Minimum assistance  Balance:   Sitting: Impaired  Sitting - Static: Good (unsupported)  Sitting - Dynamic: Fair (occasional)  Standing: Impaired; With support  Standing - Static: Fair (Fair+)  Standing - Dynamic : Fair (Fair-)  Ambulation/Gait Training:  Distance (ft): 4 Feet (ft)  Assistive Device:  (hand held assist)  Ambulation - Level of Assistance: Minimal assistance; Additional time;Assist x1; Moderate assistance  Gait Description (WDL): Exceptions to WDL  Gait Abnormalities: Altered arm swing;Decreased step clearance;Hip Hike (side steps )  Base of Support: Center of gravity altered  Speed/Kami: Fluctuations  Step Length: Right shortened;Left shortened  Interventions: Safety awareness training;Verbal cues; Visual/Demos  Therapeutic Exercises:   ankle pumps   Pain:  Pre treatment pain level:no number given   Post treatment pain level:no number given.    Pain Scale 1: Numeric (0 - 10)  Pain Intensity 1: 0  Pain Location 1: Head  Activity Tolerance:   Fair   Please refer to the flowsheet for vital signs taken during this treatment. After treatment:   [ ]         Patient left in no apparent distress sitting up in chair  [X]         Patient left in no apparent distress in bed  [X]         Call bell left within reach  [X]         Nursing notified  [ ]         Caregiver present  [ ]         Bed alarm activated      COMMUNICATION/EDUCATION:   [X]         Fall prevention education was provided and the patient/caregiver indicated understanding. [X]         Patient/family have participated as able in goal setting and plan of care. [X]         Patient/family agree to work toward stated goals and plan of care. [ ]         Patient understands intent and goals of therapy, but is neutral about his/her participation. [ ]         Patient is unable to participate in goal setting and plan of care. Patient educated on the role of physical therapy during the acute stay  and the importance of mobility. VU. Need reinforcement.         Thank you for this referral.  Trey Gutiérrez, PT   Time Calculation: 25 mins

## 2017-03-04 NOTE — PROGRESS NOTES
Neurology Progress Note      Patient: Gamal Myers MRN: [id number]  SSN: [id number]    YOB: 1980  Age: 39 y.o. Sex: male      Admit Date: 3/2/2017    LOS: 2 days     Subjective:     Chief Complaint:Neurosarcoidosis/possible superimposed bacterial meningitis  Meningitis    Said headaches better but Diplopia/blurred vision is the same. Prefer having a patch on the left eye. Continue on Decadron and antibiotics. Respiratory status stable so far. Objective:     Vitals:    03/04/17 0800 03/04/17 0900 03/04/17 1000 03/04/17 1100   BP: 124/73 134/83 139/84 130/73   Pulse: (!) 51 78 72 73   Resp: 17 15 15 15   Temp: 97.3 °F (36.3 °C)      SpO2: 100% (!) 67% 100% 100%   Weight:       Height:                Physical Exam:   Drowsy but opens eyes to his name. Speech dysarthric, oriented to hospital , self but not sure about the exact date. Ctanial nerve: Still with skew deviation, pupils 3 mm reactive. Able to raise extremities above gravity. 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 Had both arms Dysmetria. Lab/Data Review: Labs reviewed. Review of systems: Denies chest pain, said headaches better.     Medications Reviewed:  Current Facility-Administered Medications   Medication Dose Route Frequency    pantoprazole (PROTONIX) 40 mg in sodium chloride 0.9 % 10 mL injection  40 mg IntraVENous DAILY    acetaminophen (TYLENOL) tablet 650 mg  650 mg Oral Q4H PRN    enoxaparin (LOVENOX) injection 40 mg  40 mg SubCUTAneous Q24H    buPROPion XL (WELLBUTRIN XL) tablet 300 mg  300 mg Oral 7am    VANCOMYCIN INFORMATION NOTE   Other CONTINUOUS    vancomycin (VANCOCIN) 1,500 mg in 0.9% sodium chloride 500 mL IVPB  1,500 mg IntraVENous Q8H    VANCOMYCIN INFORMATION NOTE   Other ONCE    dexamethasone (DECADRON) 4 mg/mL injection 4 mg  4 mg IntraVENous Q6H    0.9% sodium chloride infusion  150 mL/hr IntraVENous CONTINUOUS    cefTRIAXone (ROCEPHIN) 2 g in 0.9% sodium chloride (MBP/ADV) 50 mL MBP  2 g IntraVENous Q12H    acyclovir (ZOVIRAX) 798 mg in 0.9% sodium chloride 250 mL IVPB  10 mg/kg IntraVENous Q8H     Past Medical History:   Diagnosis Date    Arm pain     CVA (cerebrovascular accident) ([facility] [address] 2012    Meningitis     Meningitis     Neurosarcoidosis ([facility] [address] 2012     Social History     Social History    Marital status: SINGLE     Spouse name: N/A    Number of children: N/A    Years of education: N/A     Occupational History    Not on file. Social History Main Topics    Smoking status: Current Some Day Smoker     Packs/day: 0.25    Smokeless tobacco: Never Used    Alcohol use No    Drug use: No    Sexual activity: Not Currently     Other Topics Concern    Not on file     Social History Narrative     MRI Results (most recent):    Results from Hospital Encounter encounter on 03/02/17   MRI BRAIN W WO CONT   Narrative          Impression Impression:    1. Abnormal edematous signal changes with associated mild expansion involvingleft thalamus and left midbrain with ventral peripheral enhancement, withoutrestricted diffusion signal. Given history of prior neurosarcoidosis, thiscertainly could represent parenchymal neurosarcoidosis. Differential diagnosisincludes tumefactive demyelination, with no other evidence of multiple  sclerosis. Less likely possibility includes neoplasm. Further evaluationrecommended with CSF analysis and follow-up MR imaging following treatment. Initial report was given by the radiologist on call at 8:00 PM 3/2/2017.    2. Associated narrowing of the third ventricle and upper aqueduct due to leftthalamic and midbrain process, without hydrocephalus. 3. Chronic right thalamic lacunar infarct and nonspecific upper pontine signalchanges, possible sequela of previous chronic microvascular disease ordemyelination. Chronic findings within right thalamus could also represent sequela of remote involvement of previous neurosarcoidosis.               Assessment/Plan:     Principal Problem:    Meningitis (3/2/2017)    Active Problems:    Neurosarcoidosis ([facility] [address] (10/17/2016)      Headache (3/3/2017)    By prior workup in [facility] was diagnosis with Neurosarcoidosis, affecting brainstem/thalamus. Self stopped his Cellcept some time last year. Now what appears with new flareup affecting the same area. Now on Decadron. Possible superimposed bacterial meningitis, so I agree to continue antibiotics. Doubt Herpes encephalitis due to prior similar attacks due to neurosarcoidosis. So ID to decide on Acyclovir stop. Agree with other basilar meningitis workup with CSF Fungal and AFB cultures. Due to the involvement of the brainstem, he is in critical condition and require ICU monitoring.         Signed By: Yesi Larsen MD     March 4, 2017

## 2017-03-04 NOTE — PROGRESS NOTES
Daily Progress Note: 3/4/2017 8:06 AM   Admit Date: 3/2/2017    Patient seen in follow up for multiple medical problems as listed below:  Patient Active Problem List   Diagnosis Code    Neurosarcoidosis ([facility] [address] D86.89    Left hemiparesis ([facility] [address] G81.94    Advance care planning Z71.89    Meningitis G03.9    Headache R51       Assesment     39 y.o. male with pmhx of neurosarcoidosis, depression, left hemiparesis, meningitis, stroke who presents with headache and left eye diplopia onset 3 days. Very poor historian with mild dysarthria. Baseline mentation unknown. Very poor medical insight. Apparently he had a similar presentation back in [facility] in 2011, and 2014. Neurosacroid was diagnosed and patient was on immunosuppression with cellcept but took him self off this over 1 yr ago. In ER LP suggestive of infectious process bacterial vs viral .  LP and CSF analysis with CSF protein 103, CSF , CSF glucose 46, CSF RBC 8. CSF culture and gram stain and Blood culture are pending. Patient was started on Rocephin IV, Vancomycin IV and Acyclovir. ID and Neurology consulted. Neurosarcoidosis with questionable meningitis   - CSF:WBC and protien Elevated, low normal glucose suspect bacterial origin   - CSF gram stain, CSF culture, HSV pending, blood culture pending  - Continue IV Vancomycin q12,IV  Rocephin q12. IV Acyclovir q8 stopped 3/4 as unlikely. - Prednisone 40mg x1 then decadron 4mg IV q6h  - Air droplet Isolation for 24 hours from time of antibiotic treatment.  Krupa Castelan to stop today  - Neuro and vitals sign check   - MRI -left thalamus and left midbrain with ventral peripheral enhancement with 3rd ventrical narrowing     Fever   - 2/2 above   - Tylenol as needed  - Blood and CSF culture pending  - HIV, Lyme, Oligoclonal      Red man syndrome   - Following Vancomycin infusion  - Will stop give Benadryl, ranitidine and when it resolves will restart Vancomycin and 1/2 infusion rate      Depression  - Home Wellbutrin     Neurosarcoidosis  - Prednisone every day. Pt poor historian regarding this disease.   - Patient not taking Imuran. Unclear what prior symptoms the patient has had or treatment plans. Need prior Neuro notes     Left Hemiparesis   - residual from prior stroke/neurosarcoidosis R thalamic infarct  - PT/OT     Urinary urgency  - monitor for retention. flomax started 3/4. UA wnl    DVT Protocol Active: yes  Code Status:  Full Code     Disposition: Req 48h hospitalization    Subjective:     CC: Headache; Gait Problem; and Fall    Interval History:improved headache. Still diplopia. Complains or urinary urgency however bladder scan only 200cc. Appreciate Neuro help    Objective:     Visit Vitals    /67    Pulse 70    Temp 97.6 °F (36.4 °C)    Resp 22    Ht 6' 2\" (1.88 m)    Wt 79.8 kg (176 lb)    SpO2 100%    BMI 22.6 kg/m2       Temp (24hrs), Av.9 °F (36.6 °C), Min:97.3 °F (36.3 °C), Max:98.7 °F (37.1 °C)        Intake/Output Summary (Last 24 hours) at 17 1333  Last data filed at 17 1200   Gross per 24 hour   Intake           5482.5 ml   Output              795 ml   Net           4687.5 ml       Gen: AOx2, NAD. Dipplopia on L  HEENT:  ERLIN, EOMI. Neck: No Bruits/JVD. No neck tenderness or stiffness   Lungs:   CTAB. Good respiratory effort  Heart:   RR S1 S2 without M/R/G  Abdomen: ND,NT, BSX4,   Extremities:   No LE edema. No cyanosis. Skin:  no jaundice/lesions  Neuro: CN2-12 intact. DTR 2/4 pretibial, normal UE/LE strength and sensation      Data Review:     Meds/Labs/Tests reviewed    Current Shift:  701 - 1900  In: 800 [I.V.:800]  Out: 100 [Urine:100]  Last three shifts:  1901 -  0700  In: 5182.5 [P.O.:200;  I.V.:4982.5]  Out: 697 [Urine:697]  Recent Labs      17   0420  17   1604  03/02/17   1057   WBC  12.9  12.4  14.4*   RBC  4.09*  4.36*  4.05*   HGB  10.9*  11.5*  10.6*   HCT  33.7*  36.0  33.7*   PLT  254  251  277   SHIELA   -- 73  80   LYMPH   --   17*   --    EOS   --   1  0       Recent Labs      03/04/17   0420  03/02/17   1650   BUN  10  8   CREA  0.70  0.88   CA  7.5*  7.7*   ALB   --   3.3*   K  4.1  3.8   NA  148*  136   CL  115*  98*   CO2  22  29   GLU  101*  90        Lab Results   Component Value Date/Time    Glucose 101 03/04/2017 04:20 AM    Glucose 90 03/02/2017 04:50 PM          Care coordination with Nursing/Consultants/staff: 5  Prior history, labs, and charting reviewed: 15    Procedures/Imaging:  CT head 3/1  pCXR 3/2  MRI brain 3/2         Total time spent with chart review, patient examination/education, discussion with staff on case,documentation and medication management / adjustment  :  30 Minutes      Dr Susanna Willis  Pager: [phone]

## 2017-03-04 NOTE — ROUTINE PROCESS
Bedside and Verbal shift change report given to Domitila Mckeon RN (oncoming nurse) by Isabella Devine RN   (offgoing nurse). Report included the following information SBAR, Kardex, Intake/Output, MAR and Recent Results.

## 2017-03-04 NOTE — ROUTINE PROCESS
Patient Summary:  0730- Assumed care of pt.   0830- Complex assessment charted in document flowsheets. 1200- VSS. Pt complaining of blurriness in right eye- Dr. Jaylen Pfeiffer made aware. 1340- Pt complaining of having strong urge to urinate but unable to relieve bladder. Performed bladder scan, > 200 mL result. Paged Dr. Yao. 1416- Administered flomax per Dr. Gabe Mohs order. Bladder scan checks to be done periodically- boland catheter to be placed if bladder scan > 500 mL.    1600- VSS. Pt able to void after flomax administration.

## 2017-03-04 NOTE — PROGRESS NOTES
2000 Nursing assessment done, alert and awake, no distress noted. 0000 No change noted, easily arouse. 0300 Awake, wanted something to drink, cranberry juice given. Denies pain , no distress noted. 0630 Incontinent of urine, cleaned and repositioned in bed. Awake and alert, no distress noted.

## 2017-03-05 NOTE — PROGRESS NOTES
1930 - Assumed care of pt. Pt's Mother at bedside. Dual neuro check completed with Day RN. Pt wearing left eye patch due to double vision. Right eye deviates downward and states blurry vision. Ocular movement intact. Pupils 2mm sluggish, round. Left hand contacted in fist like position but able to squeeze hand. Clear on room air. HR 60s, sinus arrhythmia. Uses urinal. Skin intact. 2100 - Mother at bedside had questions if pt does have meningitis or not. Advised what Dr. Jax Gonzalez had wrote in his note and also now that pt has had 24 + antibiotics droplet isolation can be d/c. She had some specific questions about his double and blurred vision. Advised best to speak with neurologist. Will pass to day RN for neurologist to call pt's mother tomorrow to provided update. Mother leaving at this time and will be back tomorrow after [employer]. 2200 - No changes to neuro exam.   2300 - Pt trying to sit on edge of bed. Incont of urine. Bathed pt head to toe with Hibiclens wash. Assisted pt to chair to change linens. Pt helped to bath. New linens, gown, and bed pad. Blurry/double vision affects gait, needing assist to keep steady. Pt denies any visual changes. 0030 - Pt awake, eating snacks mother left at bedside for him. Denies pain or needs, denies vision changes, pupils unchanged. 0220 - Meds given, pt wakes up easily to nurse walking into room. Vision unchanged per pt, denies pt or needs. 0400 - Reassessment completed. Right pupil slightly smaller than left, both still reactive, still both about 2mm. Pt stays still double vision in left eye and blurriness the same in the right eye. Breathing fine, HR 50-60s SA. Likes to keep urinal between legs. Left hand discomfort but nothing new, overall denies pain. 0600 - Pt sleeping, awakes easily to sound/voice. Meds given, provided fresh bed pad for pt. Emptied Urinal, still likes to keep it in between his legs.  Denies any needs    Bedside and Verbal shift change report given to SHEFALI Grullon (oncoming nurse) by Liv Foote RN   (offgoing nurse). Report included the following information SBAR.

## 2017-03-05 NOTE — PROGRESS NOTES
Neurology Progress Note      Patient: Femi Clement. MRN: [id number]  SSN: [id number]    YOB: 1980  Age: 39 y.o. Sex: male      Admit Date: 3/2/2017    LOS: 3 days     Subjective:     Chief Complaint:Neurosarcoidosis/possible superimposed bacterial meningitis  Meningitis    Said headaches continue to improve. Still with Diplopia/blurred vision . Prefer having a patch on the left eye. Continue on Decadron and antibiotics. Respiratory status stable so far. Objective:     Vitals:    03/05/17 1100 03/05/17 1200 03/05/17 1243 03/05/17 1300   BP: 134/85 139/87  127/85   Pulse: (!) 55 69 84 72   Resp: 12 15 15 16   Temp:   97.3 °F (36.3 °C) 97.5 °F (36.4 °C)   SpO2: 100% 99% 100% 92%   Weight:       Height:                Physical Exam:   Was eating when I entered. Speech dysarthric, oriented to hospital , self . Cranial nerve: pupils 3 mm reactive. I attempted Findi exam but could not see well ? Underlying cataracts. Can count in either eyes. He complained of  Binocular but also monocular Diplopia in the left eye when covered the right eye . Still with dysconjugate gaze. Right eye more medially inverted. Difficulties with vertical eye movements and better with horizontal movements. Able to raise extremities above gravity. 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 Has both arms Dysmetria. Lab/Data Review: Labs reviewed. Review of systems: Denies chest pain, said headaches better.     Medications Reviewed:  Current Facility-Administered Medications   Medication Dose Route Frequency    tamsulosin (FLOMAX) capsule 0.4 mg  0.4 mg Oral DAILY    pantoprazole (PROTONIX) 40 mg in sodium chloride 0.9 % 10 mL injection  40 mg IntraVENous DAILY    acetaminophen (TYLENOL) tablet 650 mg  650 mg Oral Q4H PRN    enoxaparin (LOVENOX) injection 40 mg  40 mg SubCUTAneous Q24H    buPROPion XL (WELLBUTRIN XL) tablet 300 mg  300 mg Oral 7am    dexamethasone (DECADRON) 4 mg/mL injection 4 mg  4 mg IntraVENous Q6H    0.9% sodium chloride infusion  150 mL/hr IntraVENous CONTINUOUS    cefTRIAXone (ROCEPHIN) 2 g in 0.9% sodium chloride (MBP/ADV) 50 mL MBP  2 g IntraVENous Q12H     Past Medical History:   Diagnosis Date    Arm pain     CVA (cerebrovascular accident) ([facility] [address] 2012    Meningitis     Meningitis     Neurosarcoidosis ([facility] [address] 2012     Social History     Social History    Marital status: SINGLE     Spouse name: N/A    Number of children: N/A    Years of education: N/A     Occupational History    Not on file. Social History Main Topics    Smoking status: Current Some Day Smoker     Packs/day: 0.25    Smokeless tobacco: Never Used    Alcohol use No    Drug use: No    Sexual activity: Not Currently     Other Topics Concern    Not on file     Social History Narrative     MRI Results (most recent):    Results from Hospital Encounter encounter on 03/02/17   MRI BRAIN W WO CONT   Narrative          Impression Impression:    1. Abnormal edematous signal changes with associated mild expansion involvingleft thalamus and left midbrain with ventral peripheral enhancement, withoutrestricted diffusion signal. Given history of prior neurosarcoidosis, thiscertainly could represent parenchymal neurosarcoidosis. Differential diagnosisincludes tumefactive demyelination, with no other evidence of multiple  sclerosis. Less likely possibility includes neoplasm. Further evaluationrecommended with CSF analysis and follow-up MR imaging following treatment. Initial report was given by the radiologist on call at 8:00 PM 3/2/2017.    2. Associated narrowing of the third ventricle and upper aqueduct due to leftthalamic and midbrain process, without hydrocephalus. 3. Chronic right thalamic lacunar infarct and nonspecific upper pontine signalchanges, possible sequela of previous chronic microvascular disease ordemyelination.  Chronic findings within right thalamus could also represent sequela of remote involvement of previous neurosarcoidosis. Assessment/Plan:     Principal Problem:    Meningitis (3/2/2017)    Active Problems:    Neurosarcoidosis ([facility] [address] (10/17/2016)      Headache (3/3/2017)    By prior workup in [facility] was diagnosis with Neurosarcoidosis, affecting brainstem/thalamus. Self stopped his Cellcept some time last year. Now what appears with new flareup affecting the same area. Now on Decadron. Possible superimposed bacterial meningitis, so I agree to continue antibiotics. Doubt Herpes encephalitis due to prior similar attacks due to neurosarcoidosis. Agree with other basilar meningitis workup with CSF Fungal and AFB cultures. D/w Dr. Yao that primary team to ask opthalmology to perform formal opthalmology exam to rule out ocular process as well. ? Papilledema causing his blurred vision. B12 on the low side so will give 1 mg IM, every other day X 4. Due to the involvement of the brainstem, he is in critical condition and require ICU monitoring. D/w Mother at bedside our management and answered all her questions.         Signed By: Candy Chacon MD     March 5, 2017        +

## 2017-03-05 NOTE — PROGRESS NOTES
0730: Bedside and Verbal shift change report given to Sarkis Bob RN (oncoming nurse) by Obdulia Cordero RN (offgoing nurse). Report included the following information SBAR, Kardex, ED Summary, OR Summary, Procedure Summary, Intake/Output, MAR, Accordion, Recent Results, Med Rec Status, Cardiac Rhythm Sinus Bradycardia and Alarm Parameters . Dual neuro exam performed. Patient denies pain. Patient still has diplopia/blurred vision. Patient incontinent of urine. Patient states \"this happens sometimes. \" Patient cleaned up. Patient likes to have the urinal between his legs almost all the time per off-going RN. Patient's left arm/hand is swollen and both of his legs are swollen. Patient's left eye is weak and patient's right lower face has weakness. Not new per off-going RN. 0830: Patient incontinent of urine. Urinal was near patient. 1000: Patient incontinent of urine. Urinal was near patient. 1100: I placed a condom cath on patient. 1120: I bladder scanned patient post void and bladder scan showed greater than 200 ml of urine. [id number]: Informed neurology and asked about accurate I/O and indwelling catheter. Asked to call ID MD to assure to that it would be okay first. Called ID and Dr. Sobia To stated that \"if it was for urinary retention then that would be okay. \" Informed Dr. Froy Turner. [id number]: Removed condom cath & placed boland catheter. 1227: UA and urine culture. 1310: Dr. Froy Turner ordered daily CBC and CMP. Informed phleb & they are going to draw in a few mins. 1320: I lost all IV access. 1325: Phleb unable to get labs on patient. [id number]: Trying to obtain IV access    [id number]: 2nd RN trying to obtain IV access    [id number]: 3rd RN trying to obtain IV access. 1518: Paged Dr. Yao. 1523: Dr. Yao called back & he stated to order a PICC line and that \"it will be okay if they put the PICC line first thing in the morning. \" Dr. Yao aware that patient has no IV access at this time. 1600: Was able to obtain 1 IV peripheral access 22 G with an US. The others failed. 1610: I sent pharmacy a message informing them that I needed patient's scheduled B12 medication. 1616: Pharmacy stated that they would sent some up shortly. 1643: Lost all IV access. [id number]: Another RN trying to obtain IV access. Due to patient's fragile IV fluid @ 150 ml/hr will not work for this IV. Thus only his slow fluids like his decadron can go through this IV.     1730: I informed pharmacy tech when he was on the unit that I need patient's b12 med and tech stated \"I will find out for you. \"    8727: I sent pharmacy a message informing them that I needed patient's scheduled B12 medication as it has not been taken up to unit, yet. 1856: Pharmacy acknowledged my message and stated \"it will be up shortly. \"    [id number]: Bedside and Verbal shift change report given to Farzaneh Mittal RN (oncoming nurse) by aSúl Trotter RN (offgoing nurse). Report included the following information SBAR, Kardex, ED Summary, Procedure Summary, Intake/Output, MAR, Accordion, Recent Results, Med Rec Status, Cardiac Rhythm NSR  and Alarm Parameters . Dual neuro exam performed.

## 2017-03-05 NOTE — PROGRESS NOTES
Problem: Dysphagia (Adult)  Goal: *Acute Goals and Plan of Care (Insert Text)  Patient will:  1. Tolerate PO trials with 0 s/s overt distress in 4/5 trials  2. Utilize compensatory swallow strategies/maneuvers (decrease bite/sip, size/rate, alt. liq/sol) with min cues in 4/5 trials    Rec:   Reg with thin liquids  Aspiration precautions  HOB >45 during po intake, remain >30 for 30-45 minutes after po   Small bites/sips; alternate liquid/solid with slow feeding rate   Oral care TID  Meds per pt preference  [address] EVALUATION     Patient: [facility]. (40 y.o. male)  Date: 3/5/2017  Primary Diagnosis: Meningitis  Meningitis        Precautions: aspiration  Fall, Contact, Other (comment) (droplet)      ASSESSMENT :  Based on the objective data described below, the patient presents with mild oral dysphagia. Oral mech exam revealed right orofacial weakness/droop which resulted in mildly increased mastication of reg solid consistency. Pt with positive oral clearance of reg solids utilizing thin liquid wash. Mod dysarthria was noted throughout evaluation. Laryngeal elevation appeared functional and timely to palpation across all trials. Pt safe for reg diet with thin liquids, aspiration precautions, oral care TID, and meds as tolerated. He may benefit from a speech evaluation at a later date/time or as medical condition permits. D/w RN and pt. ST to f/u x 1-2 more visits to ensure safety of PO. Patient will benefit from skilled intervention to address the above impairments. Patients rehabilitation potential is considered to be Good  Factors which may influence rehabilitation potential include:   [X]            None noted       PLAN :  Recommendations and Planned Interventions: See above  Frequency/Duration: Patient will be followed by speech-language pathology x 1-2 more visits to address dysphagia goals.   Discharge Recommendations: Home Health, Inpatient Rehab and To Be Determined       SUBJECTIVE:   Patient stated Everything is going down just fine. OBJECTIVE:       Past Medical History:   Diagnosis Date    Arm pain      CVA (cerebrovascular accident) ([facility] [address] 2012    Meningitis      Meningitis      Neurosarcoidosis ([facility] [address] 2012     Past Surgical History:   Procedure Laterality Date    HX HIP REPLACEMENT   2010    HX WISDOM TEETH EXTRACTION         Prior Level of Function/Home Situation: private residence  Home Situation  Home Environment: Private residence  # Steps to Enter: 0  One/Two Story Residence: Two story  Living Alone: No  Support Systems: Family member(s)  Patient Expects to be Discharged to[de-identified] Private residence  Current DME Used/Available at Home: Nicko Rosario, quad, [address] [employer] chair, Walker, rolling  Tub or Shower Type: Tub/Shower combination (has shower chair; no grab bars)  Diet prior to admission: reg with thin  Current Diet:  Reg with thin   Cognitive and Communication Status:  Neurologic State: Alert  Orientation Level: Oriented X4  Cognition: Follows commands  Perception: Appears intact  Perseveration: No perseveration noted  Safety/Judgement: Awareness of environment, Fall prevention  Oral Assessment:  Oral Assessment  Labial: Right droop  Dentition: Natural;Intact  Oral Hygiene: Adequate  Lingual: No impairment  Velum: No impairment  Mandible: No impairment  P.O. Trials:  Patient Position: [facility] at [facility]  Vocal quality prior to P.O.: No impairment  Consistency Presented: Thin liquid; Solid;Puree  How Presented: Self-fed/presented;Cup/sip;Spoon;Straw     Bolus Acceptance: No impairment  Bolus Formation/Control: Impaired  Type of Impairment: Delayed;Mastication  Propulsion: No impairment  Oral Residue: None  Initiation of Swallow: No impairment  Laryngeal Elevation: Functional  Aspiration Signs/Symptoms: None  Pharyngeal Phase Characteristics: Audible swallow  Effective Modifications: None  Cues for Modifications: None        Oral Phase Severity: Mild  Pharyngeal Phase Severity : No impairment     GCODESwallowing:  Swallow Current Status CI= 1-19%   Swallow Goal Status CH= 0%     The severity rating is based on the following outcomes:             Clinical Judgment     Pain:  Pt c/o 0/10 pain prior to evaluation. Pt c/o 0/10 pain post evaluation. After treatment:   [ ]            Patient left in no apparent distress sitting up in chair  [X]            Patient left in no apparent distress in bed  [X]            Call bell left within reach  [X]            Nursing notified  [ ]            Caregiver present  [ ]            Bed alarm activated      COMMUNICATION/EDUCATION:   [X]            SLP educated pt with regard to compensatory swallow strategies and                  aspiration/reflux precautions including: small bites/sips,                  alternate liquids/solids, decrease feeding rate, HOB > 45 with all po, and                             upright in bed at 30 degrees after po for at least 45 minutes. [X]            Patient/family have participated as able in goal setting and plan of care.      Thank you for this referral.     Duncan Hatchet, M.S. CCC-SLP/L  Speech-Language Pathologist

## 2017-03-05 NOTE — PROGRESS NOTES
Daily Progress Note: 3/5/2017 8:06 AM   Admit Date: 3/2/2017    Patient seen in follow up for multiple medical problems as listed below:  Patient Active Problem List   Diagnosis Code    Neurosarcoidosis ([facility] [address] D86.89    Left hemiparesis ([facility] [address] G81.94    Advance care planning Z71.89    Meningitis G03.9    Headache R51       Assesment     39 y.o. male with pmhx of neurosarcoidosis, depression, left hemiparesis, meningitis, stroke who presents with headache and left eye diplopia onset 3 days. Very poor historian with mild dysarthria. Baseline mentation unknown. Very poor medical insight. Apparently he had a similar presentation back in [facility] in 2011, and 2014. Neurosacroid was diagnosed and patient was on immunosuppression with cellcept but took him self off this over 1 yr ago. In ER LP suggestive of infectious process bacterial vs viral .  LP and CSF analysis with CSF protein 103, CSF , CSF glucose 46, CSF RBC 8. CSF culture and gram stain and Blood culture are pending. Patient was started on Rocephin IV, Vancomycin IV and Acyclovir. ID and Neurology consulted. Acyclovir stopped 3/4 with negative HSV on CSF. Further CSF studies have been negative for infection supporing neurosarcoidosis as the primary etiology. Neurosarcoidosis with questionable meningitis   - CSF:WBC and protien Elevated, low normal glucose suspect bacterial origin   - CSF gram stain, CSF culture all NGTD. HSV neg.  - Continue IV Vancomycin q12,IV  Rocephin q12. IV Acyclovir q8 stopped 3/4 as unlikely. - Prednisone 40mg x1 then decadron 4mg IV q6h  - Air droplet Isolation for 24 hours from time of antibiotic treatment. Ok to stop today  - Neuro and vitals sign check   - MRI -left thalamus and left midbrain with ventral peripheral enhancement with 3rd ventrical narrowing     Fever   - resolving. No infection found. Flu neg. HIV neg.   - Tylenol as needed  - Blood and CSF culture pending  - HIV, Lyme, Oligoclonal      Red man syndrome   - Following Vancomycin infusion  - Will stop give Benadryl, ranitidine and when it resolves will restart Vancomycin and 1/2 infusion rate      Depression  - Home Wellbutrin     Neurosarcoidosis  - Prednisone every day. Pt poor historian regarding this disease.   - Patient not taking Imuran. Unclear what prior symptoms the patient has had or treatment plans. Need prior Neuro notes     Left Hemiparesis   - residual from prior stroke/neurosarcoidosis R thalamic infarct  - PT/OT     Urinary urgency/inconten. - monitor for retention. flomax started 3/4. UA wnl. Suspect this is due to brainstem inflammation? DVT Protocol Active: yes  Code Status:  Full Code     Disposition: Req 48h hospitalization    Subjective:     CC: Headache; Gait Problem; and Fall    Interval History:improved headache. Still diplopia. Mentation appears slightly improved. Some urinary incont now with condom catheter. CSF Cx NGTD will stop vancomycin. Objective:     Visit Vitals    /87    Pulse 66    Temp 97.8 °F (36.6 °C)    Resp 13    Ht 6' 2\" (1.88 m)    Wt 79.8 kg (176 lb)    SpO2 99%    BMI 22.6 kg/m2       Temp (24hrs), Av.9 °F (36.6 °C), Min:97.6 °F (36.4 °C), Max:98.4 °F (36.9 °C)        Intake/Output Summary (Last 24 hours) at 17 0900  Last data filed at 17 0800   Gross per 24 hour   Intake             5490 ml   Output             1600 ml   Net             3890 ml       Gen: AOx2, NAD. Dipplopia on L  HEENT:  ERLIN, EOMI. Neck: No Bruits/JVD. No neck tenderness or stiffness   Lungs:   CTAB. Good respiratory effort  Heart:   RR S1 S2 without M/R/G  Abdomen: ND,NT, BSX4,   Extremities:   No LE edema. No cyanosis. Skin:  no jaundice/lesions  Neuro: CN2-12 intact.  DTR 2/4 pretibial, normal UE/LE strength and sensation      Data Review:     Meds/Labs/Tests reviewed    Current Shift:  701 - 1900  In: 150 [I.V.:150]  Out: -   Last three shifts:  1901 - 700  In: 8977 [P.O.:880; I.V.:8550]  Out: 2120 [Urine:2120]  Recent Labs      03/04/17   0420  03/02/17   1604  03/02/17   1057   WBC  12.9  12.4  14.4*   RBC  4.09*  4.36*  4.05*   HGB  10.9*  11.5*  10.6*   HCT  33.7*  36.0  33.7*   PLT  254  251  277   GRANS   --   73  80   LYMPH   --   17*   --    EOS   --   1  0       Recent Labs      03/04/17   0420 03/02/17   1650   BUN  10  8   CREA  0.70  0.88   CA  7.5*  7.7*   ALB   --   3.3*   K  4.1  3.8   NA  148*  136   CL  115*  98*   CO2  22  29   GLU  101*  90        Lab Results   Component Value Date/Time    Glucose 101 03/04/2017 04:20 AM    Glucose 90 03/02/2017 04:50 PM          Care coordination with Nursing/Consultants/staff: 5  Prior history, labs, and charting reviewed: 15    Procedures/Imaging:  CT head 3/1  pCXR 3/2  MRI brain 3/2         Total time spent with chart review, patient examination/education, discussion with staff on case,documentation and medication management / adjustment  :  30 Minutes      Dr Dominick Tolentino  Pager: [phone]

## 2017-03-06 NOTE — PROGRESS NOTES
Neurology Progress Note      Patient: Ching Brownlee. MRN: [id number]  SSN: [id number]    YOB: 1980  Age: 39 y.o. Sex: male      Admit Date: 3/2/2017    LOS: 4 days     Subjective:     Headaches and history of Neurosarcoidosis diagnosed in [facility]  2 years ago with frequent headaches. One week of  Diplopia/blurred vision . Eye patch helped. Had ophthalmology evaluation today. No papilledema in his eye exam.    Continue on Decadron and antibiotics. Cardiorespiratory status is stable so far. Objective:     Vitals:    03/06/17 1207 03/06/17 1300 03/06/17 1400 03/06/17 1500   BP:  122/81 119/77 119/74   Pulse:  92 73 75   Resp:  15 12 13   Temp: 97.5 °F (36.4 °C) (!) 36.5 °F (2.5 °C) (!) 36.5 °F (2.5 °C) (!) 36.4 °F (2.4 °C)   SpO2:  (!) 55%     Weight:       Height:                Physical Exam:   Awake and sitting in bed. Speech is dysarthric, oriented to hospital and himself . Cranial nerve: pupils is dilated for eye exam this afternoon. Not reactive. Dysconjugate gaze with left hypertropia and left exotropia. Difficulties with vertical eye movements and Ok with horizontal movements. No focal weakness in the extremities and is able to lift above gravity. 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 .    Lab/Data Review: Labs reviewed. Review of systems: Denies chest pain, said headaches better.     Medications Reviewed:  Current Facility-Administered Medications   Medication Dose Route Frequency    cyanocobalamin (VITAMIN B12) injection 1,000 mcg  1,000 mcg IntraMUSCular EVERY OTHER DAY    tamsulosin (FLOMAX) capsule 0.4 mg  0.4 mg Oral DAILY    pantoprazole (PROTONIX) 40 mg in sodium chloride 0.9 % 10 mL injection  40 mg IntraVENous DAILY    acetaminophen (TYLENOL) tablet 650 mg  650 mg Oral Q4H PRN    enoxaparin (LOVENOX) injection 40 mg  40 mg SubCUTAneous Q24H    buPROPion XL (WELLBUTRIN XL) tablet 300 mg  300 mg Oral 7am    dexamethasone (DECADRON) 4 mg/mL injection 4 mg  4 mg IntraVENous Q6H    0.9% sodium chloride infusion  100 mL/hr IntraVENous CONTINUOUS    cefTRIAXone (ROCEPHIN) 2 g in 0.9% sodium chloride (MBP/ADV) 50 mL MBP  2 g IntraVENous Q12H     Past Medical History:   Diagnosis Date    Arm pain     CVA (cerebrovascular accident) ([facility] [address] 2012    Meningitis     Meningitis     Neurosarcoidosis ([facility] [address] 2012     Social History     Social History    Marital status: SINGLE     Spouse name: N/A    Number of children: N/A    Years of education: N/A     Occupational History    Not on file. Social History Main Topics    Smoking status: Current Some Day Smoker     Packs/day: 0.25    Smokeless tobacco: Never Used    Alcohol use No    Drug use: No    Sexual activity: Not Currently     Other Topics Concern    Not on file     Social History Narrative     MRI Results (most recent):    Results from Hospital Encounter encounter on 03/02/17   MRI BRAIN W WO CONT   Narrative          Impression Impression:    1. Abnormal edematous signal changes with associated mild expansion involvingleft thalamus and left midbrain with ventral peripheral enhancement, withoutrestricted diffusion signal. Given history of prior neurosarcoidosis, thiscertainly could represent parenchymal neurosarcoidosis. Differential diagnosisincludes tumefactive demyelination, with no other evidence of multiple  sclerosis. Less likely possibility includes neoplasm. Further evaluationrecommended with CSF analysis and follow-up MR imaging following treatment. Initial report was given by the radiologist on call at 8:00 PM 3/2/2017.    2. Associated narrowing of the third ventricle and upper aqueduct due to leftthalamic and midbrain process, without hydrocephalus. 3. Chronic right thalamic lacunar infarct and nonspecific upper pontine signalchanges, possible sequela of previous chronic microvascular disease ordemyelination.  Chronic findings within right thalamus could also represent sequela of remote involvement of previous neurosarcoidosis. Assessment/Plan:     Principal Problem:    Meningitis (3/2/2017)    Active Problems:    Neurosarcoidosis ([facility] [address] (10/17/2016)      Headache (3/3/2017)    Neurosarcoidosis was diagnosis by prior workup in [facility] , affecting brainstem/thalamus. Self stopped his Cellcept some time last year. Now it appears with new flareup affecting the same area. Now on Decadron. Possible superimposed bacterial meningitis, so keep on his antibiotics. Agree with basilar meningitis workup with CSF Fungal and AFB cultures. So far AFB smear negative and fungus culture negative. HSV negative and HIV neg. Lyme titer was negative. Diplopia: inabiltiy to elevate the right and depress the left eye with a left hyper tropia and left exotropia, due to central 3rd nerve nucleus lesion in the brainstem. Anticipate  imporvement as the sarcoid granulomas resolve per ophthalmology. D/W Dr Keith Leary about his evaluation and treatment plan. Will keep current systemic treatment for neurosarcoidosis. Blurred vision: Decreased vision in the left due to his cataract, most likely from the steroid. B12 was borderline,  getting  1 mg IM, every other day X 4. Keep ICU monitoring and may transfer to floor tomorrow if he is stable clinically.    D/w his nurse about the neuro staus and treatment plan            Signed By: Violette Francis MD     March 6, 2017        +

## 2017-03-06 NOTE — PROGRESS NOTES
Daily Progress Note: 3/6/2017 8:06 AM   Admit Date: 3/2/2017    Patient seen in follow up for multiple medical problems as listed below:  Patient Active Problem List   Diagnosis Code    Neurosarcoidosis ([facility] [address] D86.89    Left hemiparesis ([facility] [address] G81.94    Advance care planning Z71.89    Meningitis G03.9    Headache R51       Assesment     39 y.o. male with pmhx of neurosarcoidosis, depression, left hemiparesis, meningitis, stroke who presents with headache and left eye diplopia onset 3 days. Very poor historian with mild dysarthria. Baseline mentation unknown. Very poor medical insight. Apparently he had a similar presentation back in [facility] in 2011, and 2014. Neurosacroid was diagnosed and patient was on immunosuppression with cellcept but took him self off this over 1 yr ago. In ER LP suggestive of infectious process bacterial vs viral .  LP and CSF analysis with CSF protein 103, CSF , CSF glucose 46, CSF RBC 8. CSF culture and gram stain and Blood culture negative. Patient was started on Rocephin IV, Vancomycin IV and Acyclovir. ID and Neurology consulted. Acyclovir stopped 3/4 with negative HSV on CSF. Further CSF studies have been negative for infection supporing neurosarcoidosis as the primary etiology. Vanc was d/kel. Patient is still on rocephin as neuro is considering superimposed bacterial infection(elevated WBC in CSF and h/o receiving atbx PTA) on top of neurosarcoidosis. ID has indicated that patient will need atbx for 2 weeks. Neurosarcoidosis with questionable meningitis   - CSF:WBC and protien Elevated, low normal glucose suspect bacterial origin   - CSF gram stain, CSF culture all NGTD. HSV neg.  - Continue Rocephin q12. IV Acyclovir q8 stopped 3/4 as viral meningitis unlikely. Vanc d/kel   - Prednisone 40mg x1 then decadron 4mg IV q6h  - Air droplet Isolation for 24 hours from time of antibiotic treatment.  Ok to stop today  - Neuro and vitals sign check   - MRI -left thalamus and left midbrain with ventral peripheral enhancement with 3rd ventrical narrowing     Fever   - resolved. No infection found. Flu neg. HIV neg.   - Tylenol as needed  - Blood and CSF culture neg     Red man syndrome   - Resolved     Depression  - Home Wellbutrin     Neurosarcoidosis  - Prednisone every day. Pt poor historian regarding this disease.   - Patient not taking Imuran. Unclear what prior symptoms the patient has had or treatment plans. Need prior Neuro notes     Left Hemiparesis   - residual from prior stroke/neurosarcoidosis R thalamic infarct  - PT/OT     Urinary urgency/inconten. - monitor for retention. flomax started 3/4. UA wnl. Suspect this is due to brainstem inflammation? DVT Protocol Active: yes  Code Status:  Full Code     Disposition: tbd    Subjective:     CC: Headache; Gait Problem; and Fall    Interval History:improved headache. Still diplopia. Mentation appears slightly improved. Patient says that he is feeling better today,only reporting decreased vision     Objective:     Visit Vitals    /76    Pulse 82    Temp 97.5 °F (36.4 °C)    Resp 14    Ht 6' 2\" (1.88 m)    Wt 79.8 kg (176 lb)    SpO2 99%    BMI 22.6 kg/m2       Temp (24hrs), Av.6 °F (28.7 °C), Min:36.3 °F (2.4 °C), Max:97.9 °F (36.6 °C)        Intake/Output Summary (Last 24 hours) at 17 1221  Last data filed at 17 1200   Gross per 24 hour   Intake             3235 ml   Output             3945 ml   Net             -710 ml       Gen: AOx2, NAD. Dipplopia on L  HEENT:  ERLIN, EOMI. Neck: No Bruits/JVD. No neck tenderness or stiffness   Lungs:   CTAB. Good respiratory effort  Heart:   RR S1 S2 without M/R/G  Abdomen: ND,NT, BSX4,   Extremities:   No LE edema. No cyanosis. Skin:  no jaundice/lesions  Neuro: CN2-12 intact.  DTR 2/4 pretibial, normal UE/LE strength and sensation      Data Review:     Meds/Labs/Tests reviewed    Current Shift:   0701 -  1900  In: 800 [I.V.:800]  Out: 1100 [FYADB:1268]  Last three shifts:  03/04 1901 - 03/06 0700  In: 8007 [P.O.:830;  I.V.:4730]  Out: 4465 [Urine:4465]  Recent Labs      03/06/17   0715  03/04/17   0420   WBC  10.3  12.9   RBC  4.02*  4.09*   HGB  10.7*  10.9*   HCT  33.2*  33.7*   PLT  249  254   GRANS  84*   --    LYMPH  10*   --    EOS  0   --        Recent Labs      03/06/17   0715  03/04/17   0420   BUN  8  10   CREA  0.70  0.70   CA  7.2*  7.5*   ALB  2.3*   --    K  3.8  4.1   NA  145  148*   CL  110*  115*   CO2  26  22   GLU  98  101*        Lab Results   Component Value Date/Time    Glucose 98 03/06/2017 07:15 AM    Glucose 101 03/04/2017 04:20 AM    Glucose 90 03/02/2017 04:50 PM        Procedures/Imaging:  CT head 3/1  pCXR 3/2  MRI brain 3/2       [address] [facility]  Hospitalist Division  Pager: [phone]

## 2017-03-06 NOTE — PROGRESS NOTES
Problem: Mobility Impaired (Adult and Pediatric)  Goal: *Acute Goals and Plan of Care (Insert Text)  Physical Therapy Goals  Initiated 3/3/2017 and to be accomplished within 7 day(s)  1. Patient will move from supine to sit and sit to supine , scoot up and down and roll side to side in bed with modified independence. 2. Patient will transfer from bed to chair and chair to bed with modified independence using the least restrictive device. 3. Patient will perform sit to stand with modified independence. 4. Patient will ambulate with supervision/set-up for 100 feet with the least restrictive device. 5. Patient will ascend/descend 14 stairs with handrail(s) with supervision/set-up. Outcome: Progressing Towards Goal  PHYSICAL THERAPY TREATMENT     Patient: Jt Wolff. (40 y.o. male)  Date: 3/6/2017  Diagnosis: Meningitis  Meningitis Meningitis  Precautions: Fall, Contact, Other (comment) (droplet)  Chart, physical therapy assessment, plan of care and goals were reviewed. ASSESSMENT:  Pt taken off contact and droplet precautions. Pt is slowly progressing towards reaching functional goals, requiring CGA level for performing bed mobility tasks and functional transfers with cues for proper technique/sequencing. Pt ambulated 6ft in room with min A of 2 using hand-held A for R UE support, demonstrating moderate instability, scissoring gait pattern, and increased lateral trunk sway with minor LOB x2 reps. EDUCATION: Pt education on activity pacing and safety techniques during sit<->stand transfers/gait activities to improve functional mobility and minimize risk of falls. Pt needs reinforcement.       Progression toward goals:        Improving appropriately and progressing toward goals  X    Improving slowly and progressing toward goals        Not making progress toward goals and plan of care will be adjusted       PLAN:  Patient continues to benefit from skilled intervention to address the above impairments. Continue treatment per established plan of care. Discharge Recommendations:  Rehab; pending on progress  Further Equipment Recommendations for Discharge:  quad cane, nunu-walker       SUBJECTIVE:   Patient stated \"\"      OBJECTIVE DATA SUMMARY:   Critical Behavior:  Neurologic State: Alert  Orientation Level: Oriented X4  Cognition: Follows commands  Safety/Judgement: Fall prevention     G CODE:  Functional Mobility Training:  Bed Mobility:  Rolling: Contact guard assistance  Supine to Sit: Contact guard assistance  Sit to Supine: Contact guard assistance  Transfers:  Sit to Stand: Contact guard assistance  Stand to Sit: Contact guard assistance  Balance:  Sitting: Impaired  Sitting - Static: Good (unsupported)  Sitting - Dynamic: Fair (occasional)  Standing: Impaired; With support  Standing - Static: Fair  Standing - Dynamic : Poor  Ambulation/Gait Training:  Distance (ft): 6 Feet (ft)  Assistive Device:  (Hand held A)  Ambulation - Level of Assistance: Minimal assistance; Additional time;Assist x2  Gait Abnormalities: Altered arm swing;Decreased step clearance; Path deviations;Scissoring;Shuffling gait  Base of Support: Center of gravity altered  Speed/Kami: Fluctuations  Step Length: Left shortened;Right shortened  Interventions: Safety awareness training;Verbal cues  Stairs:  Neuro Re-Education:  Therapeutic Exercises:   Vital Signs  Temp: (!) 36.4 °F (2.4 °C)     Pulse (Heart Rate): 75     BP: 119/74     Resp Rate: 13     O2 Sat (%): (!) 55 %  Pain: 0  Pre treatment pain level: 0  Post treatment pain level: 0  Pain Scale 1: Numeric (0 - 10)  Pain Intensity 1: 0  Activity Tolerance:   Fair (-)     After treatment:   Patient left in no apparent distress sitting up in chair  Patient left in no apparent distress in bed X  Call bell left within reach X  Nursing notified X  Caregiver present  Bed alarm activated      Mirella Fuller PTA   Time Calculation: 16 mins

## 2017-03-06 NOTE — PROGRESS NOTES
OT attempted 2x, pt w/PT this afternoon and now w/opthamology MD.    Will f/u 3/7/17.     ANKIT Todd/WILLIAM

## 2017-03-06 NOTE — CONSULTS
Asked to evaluate this 44year old patient who was admitted with neuro sarcoidosis. The patient is complaining of double vision and blurred vision. He states that the blurred vision started with his admission to the hospital He denies any previous eye history, no past eye surgery and no past ocular involvement of his eyes from the sarcoidosis. Exam:  VA without glasses at near: 20/20 OD  20/50    EOM: poor elevation OD  Poor elevation OS  CT/ ACT: hypertropia and exotropia OS. Pupils: 3mm to 1 mm OD  3 mm to 1 mm OS, no afferent pupillary defect OU. IOP (by tonopen) 18 mmHg OD 21 mmHg OS    Portable SLE: Lids normal OU    Conjunctiva normal OU    Cornea: normal OU    Anterior chambers: normal OU    Lens: early cataract OU    Dilated Fundus exam:    C/D: 0.2 no papilledema OU    Macula: flat OU    Vessels: Old evidence of vasculitis OD Normal OS    Impression:   Diplopia: inabiltiy to elevate gaze OU with a left hyper tropia and left exotropia, point to a central 3rd nerve nucleus lesion in the brainstem. This should resolve as the sarcoid granulomas resolve   Blurred vision: Decreased vision OS is from cataract, most likely from the steroid. 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 Plan: Continue the systemic treatment of the sarcoidosis. The steroids will worsen the cataract, but the cataract can be treated once the sarcoidosis is under control. He should have another ophthalmic exam to check his vision and IOP in 6 weeks to make sure he is not a steroid responder (does not develop increased IOP on steroids). Thank you for this consult.     Makayla Monae    Office [phone]  [facility]  [phone]

## 2017-03-06 NOTE — PROGRESS NOTES
0730: bedside report received from [facility]. No new neuro changes per the offgoing RN. Patient is awake and participating in report. No signs of distress, will continue to monitor. 1800: Patient's eyes are dilated for the exam by the ophthalmologist. Will remain dilated for up to 12 hours.  Will not take into account for neuro exam.

## 2017-03-06 NOTE — PROGRESS NOTES
Chart reviewed. Pt plan for snf, rehab has been mentioned but pt has no acute rehab portion of insurance. Will continue to monitor.

## 2017-03-06 NOTE — PROGRESS NOTES
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 Infectious vs sec to neuro-sarcoidosis - Will continue on current CNS dose Abx for Ceftriaxone as Neuro considering superimposed bacterial infection (sig elevated WBC in CSF and h/o receiving dose of Abx PTA) on top of Neurosarcoidosis. Vanco dced and should be OK.  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              MICROBIOLOGY:   3/2 blcx x2 IP,   UA neg  influenza neg,   RPR NR  CSF crypto Ag neg, BAP neg, neg CX, HSV 1/2 PCR neg, VDRL/Lyme IP     LINES/DRAINS:   PIV      MEDICATIONS:     Current Facility-Administered Medications   Medication Dose Route Frequency    cyanocobalamin (VITAMIN B12) injection 1,000 mcg  1,000 mcg IntraMUSCular EVERY OTHER DAY    tamsulosin (FLOMAX) capsule 0.4 mg  0.4 mg Oral DAILY    pantoprazole (PROTONIX) 40 mg in sodium chloride 0.9 % 10 mL injection  40 mg IntraVENous DAILY    acetaminophen (TYLENOL) tablet 650 mg  650 mg Oral Q4H PRN    enoxaparin (LOVENOX) injection 40 mg  40 mg SubCUTAneous Q24H    buPROPion XL (WELLBUTRIN XL) tablet 300 mg  300 mg Oral 7am    dexamethasone (DECADRON) 4 mg/mL injection 4 mg  4 mg IntraVENous Q6H    0.9% sodium chloride infusion  150 mL/hr IntraVENous CONTINUOUS    cefTRIAXone (ROCEPHIN) 2 g in 0.9% sodium chloride (MBP/ADV) 50 mL MBP  2 g IntraVENous Q12H       SUBJECTIVE :     Interval notes reviewed. Afebrile. In ICU. Says headache better and no neck pain. Eyes hurt and feels better with eye patch on left eye. No family at bedside. Overall feels better than before. OBJECTIVE     Visit Vitals    /83    Pulse (!) 54    Temp 97.3 °F (36.3 °C)    Resp 14    Ht 6' 2\" (1.88 m)    Wt 79.8 kg (176 lb)    SpO2 100%    BMI 22.6 kg/m2       Temp (24hrs), Av.5 °F (36.4 °C), Min:96.8 °F (36 °C), Max:98.1 °F (36.7 °C)      Gen-No distress, in ICU  HENT- No thrush, Left eye with patch  Chest- Symmetric expansion, CTA  CV-S1S2 RR, No JVD, No edema  Abd-Soft, NT, ND, BS+  Skin-No jaundice, cyanosis, clubbing. No decubitus  Muscsk- Normal muscle tone/strength        Labs: Results:   Chemistry Recent Labs      17   0715  17   0420   GLU  98  101*   NA  145  148*   K  3.8  4.1   CL  110*  115*   CO2  26  22   BUN  8  10   CREA  0.70  0.70   CA  7.2*  7.5*   AGAP  9  11   BUCR  11*  14   AP  64   --    TP  5.5*   --    ALB  2.3*   --    GLOB  3.2   --    AGRAT  0.7*   --       CBC w/Diff Recent Labs      17   0715  17   0420   WBC  10.3  12.9   RBC  4.02*  4.09*   HGB  10.7*  10.9*   HCT  33.2*  33.7*   PLT  249  254   GRANS  84*   --    LYMPH  10*   --    EOS  0   --           RADIOLOGY :          Imaging    Results from Hospital Encounter encounter on 17   XR CHEST PORT   Narrative CHEST AP PORTABLE    Indication: Chest pain. Comparison: None. Findings: The lungs appear clear. No evidence for pneumothorax or pleural  effusion. Cardiac silhouette and pulmonary vascularity appear within normal  limits. Impression IMPRESSION:    No acute cardiopulmonary disease.           Results from [facility] encounter on 03/01/17   CT HEAD WO CONT   Addendum Addendum:   One or more dose reduction techniques were used on this CT: automated  exposure control, adjustment of the mAs and/or kVp according to patient's size, and iterative reconstruction techniques. The specific techniques utilized on  this CT exam have been documented in the patient's electronic medical record. Reyes Sauer MD 3/1/2017  3:40 PM             Narrative CT HEAD WITHOUT CONTRAST    History:New onset of diplopia possible acute stroke history of neurosarcoid    CT Technique:    Serial axial noncontrast head CT was performed from base of skull to vertex. Comparison:  No prior exam available. CT Findings: There are several subtle small nodular foci of hypodensity somewhat poorly  defined within the central gray primarily in the right thalamus and adjacent  genu and posterior limb internal capsule possibly also minimally in the left  thalamus    Brain parenchyma appears of normal density without discrete hematoma, extra  axial collection, focal mass effect, or midline shift. Cerebral sulci and ventricles appear within normal limits in size and  configuration for patient age. Visible bony calvarium appears grossly unremarkable. Impression IMPRESSION:    1. No prior exams. Several small poorly defined foci of hypodensity, central  gray asymmetrically bilaterally, primarily in right thalamus and adjacent  posterior limb internal capsule, may represent lacunar infarcts, age  indeterminate. Recommend contrast enhanced MRI head for clarification. No  evident acute hemorrhage or focal mass effect. I have independently examined the patient and reviewed all lab studies and imgaing as well as review of nursing notes and physican notes from the past 24 hours. The plan of care has been discussed with the patient/relative and all questions are answered.      Jack Lundy MD  March 6, 2017    [facility] AT THE [facility]  [phone]

## 2017-03-06 NOTE — PROGRESS NOTES
Problem: Dysphagia (Adult)  Goal: *Acute Goals and Plan of Care (Insert Text)  Patient will:  1. Tolerate PO trials with 0 s/s overt distress in 4/5 trials - goal met  2. Utilize compensatory swallow strategies/maneuvers (decrease bite/sip, size/rate, alt. liq/sol) with min cues in 4/5 trials - goal met    Rec:   Reg with thin liquids  Aspiration precautions  HOB >45 during po intake, remain >30 for 30-45 minutes after po   Small bites/sips; alternate liquid/solid with slow feeding rate   Oral care TID  Meds per pt preference   Outcome: Resolved/Met Date Met:  03/06/17  SPEECH LANGUAGE PATHOLOGY DYSPHAGIA TREATMENT & DISCHARGE     Patient: Niki Chiu (40 y.o. male)  Date: 3/6/2017  Diagnosis: Meningitis  Meningitis Meningitis       Precautions: aspiration Fall, Contact, Other (comment) (droplet)      ASSESSMENT:  Pt seen for skilled meal assessment. Pt presents with normal OP function. Pt with no s/sx aspiration with regular/thin liquid diet. Functional laryngeal elevation to palpation. Recommend continuation of regular/thin liquid diet. Maximum therapeutic gains met; safest, least restrictive diet achieved in current in-patient/acute setting. Accordingly, SLP to d/c intervention at this time. Progression toward goals:  [X]         Improving appropriately - goals met/approximated  [ ]         Not making progress/Not appropriate - will d/c POC       PLAN:  Recommendations and Planned Interventions:  Maximum therapeutic gains met; safest, least restrictive diet achieved. D/C ST intervention at this time. Discharge Recommendations:  Inpatient Rehab       SUBJECTIVE:   Patient stated [facility], [facility] there.       OBJECTIVE:   Cognitive and Communication Status:  Neurologic State: Alert  Orientation Level: Oriented X4  Cognition: Follows commands  Perception: Appears intact  Perseveration: No perseveration noted  Safety/Judgement: Fall prevention  Dysphagia Treatment:  Oral Assessment:  Oral Assessment  Labial: Right droop  Dentition: Natural  Oral Hygiene: good  Lingual: No impairment  Velum: No impairment  Mandible: No impairment  P.O. Trials:              Patient Position: HOB45              Vocal quality prior to P.O.: No impairment              Consistency Presented: Thin liquid, Solid              How Presented: Self-fed/presented                             Bolus Acceptance: No impairment              Bolus Formation/Control: No impairment              Type of Impairment: Delayed, Mastication              Propulsion: No impairment              Oral Residue: None              Initiation of Swallow: No impairment              Laryngeal Elevation: Functional              Aspiration Signs/Symptoms: None              Pharyngeal Phase Characteristics: No impairment, issues, or problems               Effective Modifications: Alternate liquids/solids              Cues for Modifications: None                                Oral Phase Severity: No impairment              Pharyngeal Phase Severity : No impairment                PAIN:  Start of Tx: 0  End of Tx: 0      After treatment:   [ ]              Patient left in no apparent distress sitting up in chair  [X]              Patient left in no apparent distress in bed  [X]              Call bell left within reach  [ ]              Nursing notified  [ ]              Caregiver present  [ ]              Bed alarm activated         COMMUNICATION/EDUCATION:   [X]        Aspiration precautions; swallow safety; compensatory techniques  [ ]        Patient unable to participate in education; education ongoing with staff  [ ]         Posted safety precautions in patient's room.   [ ]         Oral-motor/laryngeal strengthening exercises     Christian Farmer   SLP Intern     Time Calculation: 15 mins

## 2017-03-06 NOTE — PROGRESS NOTES
1920> assumed care. Patient is awake and alert, denies any pain, dual neuro checks completed with Eli Bell RN.   0000> Patient remained stable. No pain. 0400> Patient in stable condition. 0735> Bedside and Verbal shift change report given to [facility], [address] [place] [address] (oncoming nurse) by Parish Simpson RN (offgoing nurse). Report included the following information SBAR, Kardex, Intake/Output, MAR and Recent Results.

## 2017-03-06 NOTE — PROGRESS NOTES
met with patient completed the initial Spiritual Assessment of the patient, and offered Pastoral Care, see flow sheets for interventions.  extended the assurance of prayer and spiritual care materials. Patient does not have any Restoration/cultural needs that will affect patients preferences in health care. The patient was informed that chaplains will continue to follow and will provide pastoral care on an as needed/requested basis.     Carlos Nugent, MPH, [address]  [phone]

## 2017-03-06 NOTE — DIABETES MGMT
NUTRITIONAL ASSESSMENT AND  PLAN OF CARE     Aarti Phipps.           39 y.o.           3/2/2017                 1. Meningitis    2. Neurosarcoidosis in adult [facility])    CVA   INTERVENTIONS/PLAN:   1. Add extra food at dinner for pt to have as HS snack. 2.  Monitor po intake, labs and weights. ASSESSMENT:   Pt is 93% ideal wt; BMI (calculated): 22.6 kg/m2 (normal weight). Pt appears well nourished from available data. Pt eating well at lunch today. SUBJECTIVE/OBJECTIVE:   Information obtained from: chart review, ICU rounds, pt  Pt reports his appetite is good. Denies food allergies, problems with chewing or swallowing or recent weight changes.   Pt with pmhx of neurosarcoidosis, depression, left hemiparesis from prior stroke, meningitis, stroke who presents with headache and left eye diplopia onset 3 days      Diet: regular    Patient Vitals for the past 100 hrs:   % Diet Eaten   03/05/17 1830 75 %   03/05/17 1245 100 %   03/04/17 1822 100 %   03/04/17 1300 100 %   03/04/17 0830 100 %   03/03/17 1342 0 %         Medications: [x]                Reviewed   Pertinent:  Decadron 4 mg every 6 hours;  Vit B-12 injections  IVF:  NS at 150 ml/hr  Most Recent POC Glucose: Recent Labs      03/06/17   0715  03/04/17   0420   GLU  98  101*         Labs:   Lab Results   Component Value Date/Time    Hemoglobin A1c 5.0 03/02/2017 04:04 PM     Lab Results   Component Value Date/Time    Sodium 145 03/06/2017 07:15 AM    Potassium 3.8 03/06/2017 07:15 AM    Chloride 110 03/06/2017 07:15 AM    CO2 26 03/06/2017 07:15 AM    Anion gap 9 03/06/2017 07:15 AM    Glucose 98 03/06/2017 07:15 AM    BUN 8 03/06/2017 07:15 AM    Creatinine 0.70 03/06/2017 07:15 AM    Calcium 7.2 03/06/2017 07:15 AM    Albumin 2.3 03/06/2017 07:15 AM       Anthropometrics: IBW : 86.2 kg (190 lb), % IBW (Calculated): 92.63 %, BMI (calculated): 22.6  Wt Readings from Last 1 Encounters:   03/03/17 79.8 kg (176 lb)    Ht Readings from Last 1 Encounters:   03/03/17 6' 2\" (1.88 m)       Estimated Nutrition Needs:  2510 Kcals/day, Protein (g): 96 g Fluid (ml): 2800 ml  Based on:   [x]          Actual BW    []          ABW   []            Adjusted BW        Nutrition Diagnoses:   None at this time. Nutrition Interventions: Add extra food at dinner for pt to save as HS snack to better ensure pt meet energy needs. Goal:   Weight maintenance (+/- 1-2 kg) by 3/16/17.         Nutrition Monitoring and Evaluation      [x]     Monitor po intake on meal rounds  [x]     Continue inpatient monitoring and intervention  []     Other:      Nutrition Risk:  []   High     [x]  Moderate    []  Minimal/Uncompromised    Myles Rosas RD   LR pager [phone]

## 2017-03-07 NOTE — PROGRESS NOTES
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 Infectious vs sec to neuro-sarcoidosis  - off acyclovir as HSV PCR neg , VDRL/ lyme titers neg   - Neuro considering superimposed bacterial infection (sig elevated WBC in CSF and h/o receiving dose of Abx PTA) on top of Neurosarcoidosis flare.  - Will continue CNS dose Ceftriaxone. Will need 2 weeks of Abx [ 9 more days ]  - AFB smear neg (Was on cellcept, but took himself off )   - on decadron per neurology for neurosarcoidosis    Leucocytosis - Monitor, resolving  - on steroids currently   Fever - Improving   Neuro-Sarcoidosis- unclear if took steroids on regular basis or not  - Was on Cellcept but non compliant and took himself off.  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        MICROBIOLOGY:   3/2 blcx x2 ntd,        UA neg       influenza neg,        RPR NR       CSF crypto Ag neg, BAP neg, neg CX, HSV 1/2 PCR neg, VDRL/Lyme neg     LINES/DRAINS:   PIV    MEDICATIONS:     Current Facility-Administered Medications   Medication Dose Route Frequency    cyanocobalamin (VITAMIN B12) injection 1,000 mcg  1,000 mcg IntraMUSCular EVERY OTHER DAY    tamsulosin (FLOMAX) capsule 0.4 mg  0.4 mg Oral DAILY    pantoprazole (PROTONIX) 40 mg in sodium chloride 0.9 % 10 mL injection  40 mg IntraVENous DAILY    acetaminophen (TYLENOL) tablet 650 mg  650 mg Oral Q4H PRN    enoxaparin (LOVENOX) injection 40 mg  40 mg SubCUTAneous Q24H    buPROPion XL [facility] XL) tablet 300 mg  300 mg Oral 7am    dexamethasone (DECADRON) 4 mg/mL injection 4 mg  4 mg IntraVENous Q6H    0.9% sodium chloride infusion  100 mL/hr IntraVENous CONTINUOUS    cefTRIAXone (ROCEPHIN) 2 g in 0.9% sodium chloride (MBP/ADV) 50 mL MBP  2 g IntraVENous Q12H       SUBJECTIVE :     Interval notes reviewed. Afebrile. Denies any headache, left eye vision not better. No other complaints. OBJECTIVE     Visit Vitals    /89 (BP 1 Location: Left arm, BP Patient Position: At rest)    Pulse (!) 56    Temp 97.6 °F (36.4 °C)    Resp 14    Ht 6' 2\" (1.88 m)    Wt 79.8 kg (176 lb)    SpO2 98%    BMI 22.6 kg/m2       Temp (24hrs), Av.7 °F (36.5 °C), Min:97.3 °F (36.3 °C), Max:98.4 °F (36.9 °C)      Gen-No distress, out of ICU  HENT- No thrush, Left eye with patch  Chest- Symmetric expansion, CTA  CV-S1S2 RR, No JVD, No edema  Abd-Soft, NT, ND, BS+  Skin-No jaundice, cyanosis, clubbing. No decubitus  Ext - no edema   Cns - left sided hemiparesis, awake, alert, patch over left eye    Labs: Results:   Chemistry Recent Labs      17   0700  17   0715   GLU  99  98   NA  143  145   K  3.8  3.8   CL  109*  110*   CO2  27  26   BUN  8  8   CREA  0.72  0.70   CA  7.6*  7.2*   AGAP  7  9   BUCR  11*  11*   AP  66  64   TP  5.3*  5.5*   ALB  2.3*  2.3*   GLOB  3.0  3.2   AGRAT  0.8  0.7*      CBC w/Diff Recent Labs      17   0700  17   0715   WBC  10.0  10.3   RBC  4.14*  4.02*   HGB  10.9*  10.7*   HCT  34.0*  33.2*   PLT  239  249   GRANS  85*  84*   LYMPH  11*  10*   EOS  0  0          RADIOLOGY :      Imaging    Results from Hospital Encounter encounter on 17   XR CHEST PORT   Narrative CHEST AP PORTABLE    Indication: Chest pain. Comparison: None. Findings: The lungs appear clear. No evidence for pneumothorax or pleural  effusion. Cardiac silhouette and pulmonary vascularity appear within normal  limits.          Impression IMPRESSION:    No acute cardiopulmonary disease. Results from [place] encounter on 03/01/17   CT HEAD WO CONT   Addendum Addendum:   One or more dose reduction techniques were used on this CT: automated  exposure control, adjustment of the mAs and/or kVp according to patient's size, and iterative reconstruction techniques. The specific techniques utilized on  this CT exam have been documented in the patient's electronic medical record. Hershal Snellen, MD 3/1/2017  3:40 PM             Narrative CT HEAD WITHOUT CONTRAST    History:New onset of diplopia possible acute stroke history of neurosarcoid    CT Technique:    Serial axial noncontrast head CT was performed from base of skull to vertex. Comparison:  No prior exam available. CT Findings: There are several subtle small nodular foci of hypodensity somewhat poorly  defined within the central gray primarily in the right thalamus and adjacent  genu and posterior limb internal capsule possibly also minimally in the left  thalamus    Brain parenchyma appears of normal density without discrete hematoma, extra  axial collection, focal mass effect, or midline shift. Cerebral sulci and ventricles appear within normal limits in size and  configuration for patient age. Visible bony calvarium appears grossly unremarkable. Impression IMPRESSION:    1. No prior exams. Several small poorly defined foci of hypodensity, central  gray asymmetrically bilaterally, primarily in right thalamus and adjacent  posterior limb internal capsule, may represent lacunar infarcts, age  indeterminate. Recommend contrast enhanced MRI head for clarification. No  evident acute hemorrhage or focal mass effect. I have independently examined the patient and reviewed all lab studies and imgaing as well as review of nursing notes and physican notes from the past 24 hours. The plan of care has been discussed with the patient/relative and all questions are answered.      Refugio Dior, MD  March 7, 2017    [facility] AT THE [facility]  [phone]

## 2017-03-07 NOTE — PROGRESS NOTES
[facility]: This patient meets P & T approved criteria for conversion from IV to oral therapy for the following medication:     Dexamethasone 4 mg IV q6h was discontinued and Dexamethasone 4 mg PO 6 with meals was started. Pantoprazole 40 mg IV q24h for SUP was discontinued and Pantoprazole 40 mg tab PO daily was ordered. If the patient no longer meets all criteria for oral therapy, the pharmacist will switch back to IV therapy. Thanks.

## 2017-03-07 NOTE — PROGRESS NOTES
2000> Patient arrived from [facility] per bed accompanied by transport team. Patient is alert and oriented x 4. Pupils are dilated at this time. Denies any pain or any discomfort. Bed locked and in low position, call bell within reach. Will continue to monitor.

## 2017-03-07 NOTE — ROUTINE PROCESS
Bedside and Verbal shift change report given to Geraline Phalen (oncoming nurse) by Juliane Bence, RN (offgoing nurse). Report included the following information SBAR, Kardex, Intake/Output and MAR.

## 2017-03-07 NOTE — PROGRESS NOTES
Problem: Self Care Deficits Care Plan (Adult)  Goal: *Acute Goals and Plan of Care (Insert Text)  Occupational Therapy Goals  Initiated 3/3/2017 within 7 day(s). 1. Patient will perform grooming tasks with supervision/set-up   2. Patient will perform upper body dressing with supervision/set-up. 3. Patient will perform lower body dressing with moderate assistance . 4. Patient will perform toilet transfers with supervision/set-up. 5. Patient will perform all aspects of toileting with supervision/set-up. 6. Patient will participate in upper extremity therapeutic exercise/activities with minimal assistance/contact guard assist for 8 minutes to increase/maintain BUE function for ADLs. 7. Patient will utilize energy conservation techniques during functional activities with verbal cues. Outcome: Progressing Towards Goal  OCCUPATIONAL THERAPY TREATMENT     Patient: Gamal Myers (40 y.o. male)  Date: 3/7/2017  Diagnosis: Meningitis  Meningitis Meningitis       Precautions: Fall, Contact, Other (comment) (droplet)  Chart, occupational therapy assessment, plan of care, and goals were reviewed. ASSESSMENT:  Pt is pleasant and cooperative, continues w/dilopia, using eye patch w/minimal results. RUE hand contracted and decrease ROM 2/2 c/o pain 8/10. Pt educated on compensatory strategies w/oral care and UB dressing ADL. Pt requires increase time for task completion seated EOB. Provided L palm protector for skin integrity. EDUCATION Pt educated on energy conservation techniques w/ADLs  Progression toward goals:  [ ]          Improving appropriately and progressing toward goals  [X]          Improving slowly and progressing toward goals  [ ]          Not making progress toward goals and plan of care will be adjusted       PLAN:  Patient continues to benefit from skilled intervention to address the above impairments. Continue treatment per established plan of care.   Discharge Recommendations:  Rehab vs Home Health  Further Equipment Recommendations for Discharge:  shower chair and rolling walker       SUBJECTIVE:   Patient stated I like to walk around alot.       OBJECTIVE DATA SUMMARY:         Cognitive/Behavioral Status:  Neurologic State: Alert  Orientation Level: Oriented X4  Cognition: Appropriate for age attention/concentration, Follows commands  Safety/Judgement: Fall prevention  Functional Mobility and Transfers for ADLs:              Bed Mobility:     Supine to Sit: Supervision  Sit to Supine: Modified independent  Balance:  Sitting: Intact  Sitting - Static: Good (unsupported)  Sitting - Dynamic: Fair (occasional)     ADL Intervention:  Grooming  Washing Face: Supervision/set-up  Washing Hands: Supervision/set-up  Brushing Teeth: Stand-by assistance     Upper Body [address]: Minimum  assistance; Compensatory technique training     Pain:  Pre Treatment:8  Post Treatment:8  Pain Scale 1: Numeric (0 - 10)  Pain Intensity 1: 8  Pain Location 1: Arm  Pain Orientation 1: Left  Pain Intervention(s) 1: Nurse notified     Activity Tolerance:    Fair, pt fatigues easily     Please refer to the flowsheet for vital signs taken during this treatment.   After treatment:   [ ]  Patient left in no apparent distress sitting up in chair  [X]  Patient left in no apparent distress in bed  [X]  Call bell left within reach  [X]  Nursing notified  [ ]  Caregiver present  [ ]  Bed alarm activated     ANKIT Quach  Time Calculation: 27 mins

## 2017-03-07 NOTE — PROGRESS NOTES
Neurology Progress Note      Patient: Cesilia Flores. MRN: [id number]  SSN: [id number]    YOB: 1980  Age: 39 y.o. Sex: male      Admit Date: 3/2/2017    LOS: 5 days     Subjective:     He denies headaches today and still has double vision and using the Eye patch. on Decadron and antibiotics. He is transferred to floor today since he is stable. Objective:     Vitals:    03/07/17 0530 03/07/17 0755 03/07/17 1132 03/07/17 1611   BP: 146/84 137/89 133/72 125/71   Pulse: 62 (!) 56 85 75   Resp: 15 14 14 14   Temp: 98.1 °F (36.7 °C) 97.6 °F (36.4 °C) 97.9 °F (36.6 °C) 98 °F (36.7 °C)   SpO2: 97% 98% 98% 94%   Weight:       Height:                Physical Exam:   Awake and sitting in bed. Speech is dysarthric, oriented to hospital and himself . Cranial nerve: pupils is dilated for eye exam this afternoon. Not reactive. Dysconjugate gaze with left hypertropia and left exotropia. Difficulties with vertical eye movements and Ok with horizontal movements. No focal weakness in the extremities and is able to lift above gravity. 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 .    Lab/Data Review: Labs reviewed. Review of systems: Denies chest pain, said headaches better.     Medications Reviewed:  Current Facility-Administered Medications   Medication Dose Route Frequency    [START ON 3/8/2017] pantoprazole (PROTONIX) tablet 40 mg  40 mg Oral DAILY    dexamethasone (DECADRON) tablet 4 mg  4 mg Oral Q6H    cyanocobalamin (VITAMIN B12) injection 1,000 mcg  1,000 mcg IntraMUSCular EVERY OTHER DAY    tamsulosin (FLOMAX) capsule 0.4 mg  0.4 mg Oral DAILY    acetaminophen (TYLENOL) tablet 650 mg  650 mg Oral Q4H PRN    enoxaparin (LOVENOX) injection 40 mg  40 mg SubCUTAneous Q24H    buPROPion XL (WELLBUTRIN XL) tablet 300 mg  300 mg Oral 7am    0.9% sodium chloride infusion  100 mL/hr IntraVENous CONTINUOUS    cefTRIAXone (ROCEPHIN) 2 g in 0.9% sodium chloride (MBP/ADV) 50 mL MBP  2 g IntraVENous Q12H Past Medical History:   Diagnosis Date    Arm pain     [facility]A [facility]cerebrovascular accident) ([facility] [facility] 2012    Meningitis     Meningitis     [facility]     Social History     Social History    Marital status: SINGLE     Spouse name: N/A    Number of children: N/A    Years of education: N/A     Occupational History    Not on file. Social History Main Topics    Smoking status: Current Some Day Smoker     Packs/day: 0.25    Smokeless tobacco: Never Used    Alcohol use No    Drug use: No    Sexual activity: Not Currently     Other Topics Concern    Not on file     Social History Narrative     MRI Results (most recent):    Results from Hospital Encounter encounter on 03/02/17   MRI BRAIN W WO CONT   Narrative          Impression Impression:    1. Abnormal edematous signal changes with associated mild expansion involvingleft thalamus and left midbrain with ventral peripheral enhancement, withoutrestricted diffusion signal. Given history of prior neurosarcoidosis, thiscertainly could represent parenchymal neurosarcoidosis. Differential diagnosisincludes tumefactive demyelination, with no other evidence of multiple  sclerosis. Less likely possibility includes neoplasm. Further evaluationrecommended with CSF analysis and follow-up MR imaging following treatment. Initial report was given by the radiologist on call at 8:00 PM 3/2/2017.    2. Associated narrowing of the third ventricle and upper aqueduct due to leftthalamic and midbrain process, without hydrocephalus. 3. Chronic right thalamic lacunar infarct and nonspecific upper pontine signalchanges, possible sequela of previous chronic microvascular disease ordemyelination. Chronic findings within right thalamus could also represent sequela of remote involvement of previous neurosarcoidosis.               Assessment/Plan:     Principal Problem:    Meningitis (3/2/2017)    Active Problems:    Neurosarcoidosis ([facility] [address] [facility] (10/17/2016)      Headache (3/3/2017)    Neurosarcoidosis diagnosis by prior workup in [facility] , affecting brainstem/thalamus. Now new flareup affecting the same area. Getting Decadron. Clinically he is improving and no more headaches. Will have a repeat MRI and will taper his decadron and switch it to Prednisone if MRI shows improvement. Possible re-initiate imuran if ok with ID. Possible superimposed bacterial meningitis could not excluded , so keep on his antibiotics. Agree with basilar meningitis workup with CSF Fungal and AFB cultures. Diplopia: inabiltiy to elevate the right and depress the left eye with left hyper tropia. the left exotropia is improving. Will keep current systemic treatment for neurosarcoidosis. Blurred vision: Decreased vision in the left due to his cataract, most likely from the steroid. B12 was borderline,  On supplement.               Signed By: Viri Camejo MD     March 7, 2017        +

## 2017-03-07 NOTE — INTERDISCIPLINARY ROUNDS
IDR Summary      Patient: Ny Villalobos MRN: [id number]    Age: 39 y.o.  : 1980     Admit Diagnosis: Meningitis  Meningitis      Problems pertinent to hospital stay:     Consults:P.T, O.T. and Case Management     Testing due for patient today? YES    Nutrition plan:Yes     Mobility needs: Yes      Lines/Tubes:   IV: YES   Needed: YES  Gandhi: NO  Needed:NO  Central Line: NO Needed: NO      VTE Prophylaxis: Chemical    Influenza Vaccine received?  YES        Care Management:  Discharge plan: ARU vs SNF    PCP: Alethea Moreno MD    : NO  Financial concerns:No   Interventions:       LOS: 5 days     Expected days until discharge: 2-3 days        Signed:     RAFAEL Davis  [facility]  Pager:  [phone]  Office:  [phone]

## 2017-03-07 NOTE — ROUTINE PROCESS
[address] per order. 1838  Pt off to MRI    1930  Bedside and Verbal shift change report given to Michelle MEEHAN by Myles Redd RN. Report included the following information SBAR, Kardex, Intake/Output and MAR.

## 2017-03-07 NOTE — PROGRESS NOTES
Problem: Mobility Impaired (Adult and Pediatric)  Goal: *Acute Goals and Plan of Care (Insert Text)  Physical Therapy Goals  Initiated 3/3/2017 and to be accomplished within 7 day(s)  1. Patient will move from supine to sit and sit to supine , scoot up and down and roll side to side in bed with modified independence. 2. Patient will transfer from bed to chair and chair to bed with modified independence using the least restrictive device. 3. Patient will perform sit to stand with modified independence. 4. Patient will ambulate with supervision/set-up for 100 feet with the least restrictive device. 5. Patient will ascend/descend 14 stairs with handrail(s) with supervision/set-up. Outcome: Progressing Towards Goal  PHYSICAL THERAPY TREATMENT     Patient: Govind Ramirez (40 y.o. male)  Date: 3/7/2017  Diagnosis: Meningitis  Meningitis Meningitis  Precautions: Fall, Contact, Other (comment) (droplet)   Chart, physical therapy assessment, plan of care and goals were reviewed. ASSESSMENT:  No assistance needed for bed mobility. Unable to open L hand fully for proper  on RW. Ambulated 25ft LOB to the L multiple times requiring modA to maintain balance. Pt returned to bed, provided with cranberry juice. No c/o pain. Education: RW mgmt and safety. Progression toward goals:  [ ]      Improving appropriately and progressing toward goals  [X]      Improving slowly and progressing toward goals  [ ]      Not making progress toward goals and plan of care will be adjusted       PLAN:  Patient continues to benefit from skilled intervention to address the above impairments. Continue treatment per established plan of care. Discharge Recommendations:  Inpatient Acute Rehab vs Skilled Nursing Facility  Further Equipment Recommendations for Discharge:  rolling walker vs hemiwalker, TBD       SUBJECTIVE:   Patient stated My balance is worse.       OBJECTIVE DATA SUMMARY:   Critical Behavior:  Neurologic State: Alert  Orientation Level: Oriented X4  Cognition: Follows commands  Safety/Judgement: Fall prevention  Functional Mobility Training:  Bed Mobility:  Supine to Sit: Supervision  Sit to Supine: Supervision  Transfers:  Sit to Stand: Contact guard assistance  Stand to Sit: Contact guard assistance  Balance:  Sitting: Intact  Sitting - Static: Good (unsupported)  Sitting - Dynamic: Fair (occasional) (+)  Standing: Impaired; With support  Standing - Static: Fair  Standing - Dynamic : Poor  Ambulation/Gait Training:  Distance (ft): 25 Feet (ft)  Assistive Device: Gait belt;Walker, rolling  Ambulation - Level of Assistance: Moderate assistance  Gait Abnormalities: Decreased step clearance; Path deviations  Base of Support: Narrowed  Speed/Kami: Slow  Pain:  Pre 0  Post 0  Pain Scale 1: Numeric (0 - 10)  Activity Tolerance:   Fair  Please refer to the flowsheet for vital signs taken during this treatment.   After treatment:   [ ] Patient left in no apparent distress sitting up in chair  [X] Patient left in no apparent distress in bed  [X] Call bell left within reach  [ ] Nursing notified  [ ] Caregiver present  [ ] Bed alarm activated      [address] PTA   Time Calculation: 15 mins

## 2017-03-07 NOTE — PROGRESS NOTES
Rec'd pt from 2600 via  in stable condition. VSS. Clear on RA. States he is still having blurred & double vision. Wearing patch over L eye. Cris Hernandez contracted. Lue & LLE weakness. Gandhi intact. Placed on remote tele & is SB. Denies pain.

## 2017-03-07 NOTE — ROUTINE PROCESS
TRANSFER - OUT REPORT:    Verbal report given to Cory RN(name) on [facility].  being transferred to [facility](unit) for routine progression of care       Report consisted of patients Situation, Background, Assessment and   Recommendations(SBAR). Information from the following report(s) SBAR, Kardex, Intake/Output, MAR and Recent Results was reviewed with the receiving nurse. Lines:   Peripheral IV 03/05/17 Right Hand (Active)   Site Assessment Clean, dry, & intact 3/7/2017  5:00 AM   Phlebitis Assessment 0 3/7/2017  5:00 AM   Infiltration Assessment 0 3/7/2017  5:00 AM   Dressing Status Clean, dry, & intact 3/7/2017  4:00 AM   Dressing Type Tape;Transparent 3/7/2017  4:00 AM   Hub Color/Line Status Pink; Infusing 3/7/2017  4:00 AM   Action Taken Open ports on tubing capped 3/7/2017  4:00 AM   Alcohol Cap Used Yes 3/7/2017  4:00 AM       Peripheral IV 03/06/17 Left;Mid Forearm (Active)   Site Assessment Clean, dry, & intact 3/7/2017  5:00 AM   Phlebitis Assessment 0 3/7/2017  5:00 AM   Infiltration Assessment 0 3/7/2017  5:00 AM   Dressing Status Clean, dry, & intact 3/7/2017  4:00 AM   Dressing Type Tape;Transparent 3/7/2017  4:00 AM   Hub Color/Line Status Pink;Capped;Flushed 3/7/2017  4:00 AM   Action Taken Open ports on tubing capped 3/7/2017  4:00 AM   Alcohol Cap Used Yes 3/7/2017  4:00 AM        Opportunity for questions and clarification was provided.       Patient transported with:   Registered Nurse

## 2017-03-07 NOTE — PROGRESS NOTES
Daily Progress Note: 3/7/2017 8:06 AM   Admit Date: 3/2/2017    Patient seen in follow up for multiple medical problems as listed below:  Patient Active Problem List   Diagnosis Code    Neurosarcoidosis ([facility] [address] D86.89    Left hemiparesis ([facility] [address] G81.94    Advance care planning Z71.89    Meningitis G03.9    Headache R51       Assesment     39 y.o. male with pmhx of neurosarcoidosis, depression, left hemiparesis, meningitis, stroke who presents with headache and left eye diplopia onset 3 days. Very poor historian with mild dysarthria. Baseline mentation unknown. Very poor medical insight. Apparently he had a similar presentation back in [facility] in 2011, and 2014. Neurosacroid was diagnosed and patient was on immunosuppression with cellcept but took him self off this over 1 yr ago. In ER LP suggestive of infectious process bacterial vs viral .  LP and CSF analysis with CSF protein 103, CSF , CSF glucose 46, CSF RBC 8. CSF culture and gram stain and Blood culture negative. Patient was started on Rocephin IV, Vancomycin IV and Acyclovir. ID and Neurology consulted. Acyclovir stopped 3/4 with negative HSV on CSF. Further CSF studies have been negative for infection supporing neurosarcoidosis as the primary etiology. Vanc was d/kel. Patient is still on rocephin as neuro is considering superimposed bacterial infection(elevated WBC in CSF and h/o receiving atbx PTA) on top of neurosarcoidosis. ID has indicated that patient will need atbx for 2 weeks. Neurosarcoidosis with questionable meningitis   - On rocephin and decadron.  - Prednisone 40mg x1 then decadron 4mg IV q6h  - MRI -left thalamus and left midbrain with ventral peripheral enhancement with 3rd ventrical narrowing     Fever   - resolved. No infection found. Flu neg. HIV neg.   - Tylenol as needed  - Blood and CSF culture neg     Red man syndrome   - Resolved     Depression  - Home Wellbutrin     Neurosarcoidosis  - Prednisone every day.  Pt poor historian regarding this disease.   - Patient not taking Imuran. Unclear what prior symptoms the patient has had or treatment plans. Need prior Neuro notes     Left Hemiparesis   - residual from prior stroke/neurosarcoidosis R thalamic infarct  - PT/OT     Urinary urgency/inconten. - monitor for retention. flomax started 3/4. UA wnl. Suspect this is due to brainstem inflammation? DVT Protocol Active: yes  Code Status:  Full Code     Disposition: 3/8    Subjective:     CC: Headache; Gait Problem; and Fall    Interval History:No complaint today     Objective:     Visit Vitals    /71 (BP 1 Location: Left arm, BP Patient Position: At rest)    Pulse 75    Temp 98 °F (36.7 °C)    Resp 14    Ht 6' 2\" (1.88 m)    Wt 79.8 kg (176 lb)    SpO2 94%    BMI 22.6 kg/m2       Temp (24hrs), Av.9 °F (36.6 °C), Min:97.3 °F (36.3 °C), Max:98.4 °F (36.9 °C)        Intake/Output Summary (Last 24 hours) at 17 1704  Last data filed at 17 1630   Gross per 24 hour   Intake             1670 ml   Output             2685 ml   Net            -1015 ml       Gen: AOx2, NAD. Dipplopia on L  HEENT:  ERLIN, EOMI. Neck: No Bruits/JVD. No neck tenderness or stiffness   Lungs:   CTAB. Good respiratory effort  Heart:   RR S1 S2 without M/R/G  Abdomen: ND,NT, BSX4,   Extremities:   No LE edema. No cyanosis. Skin:  no jaundice/lesions  Neuro: CN2-12 intact.  DTR 2/4 pretibial, normal UE/LE strength and sensation      Data Review:     Meds/Labs/Tests reviewed    Current Shift:  701 - 1900  In: 480 [P.O.:480]  Out: 1100 [Urine:1100]  Last three shifts:  1901 - 700  In: 4081.7 [P.O.:845; I.V.:3236.7]  Out: 4150 [Urine:4150]  Recent Labs      17   0700  17   0715   WBC  10.0  10.3   RBC  4.14*  4.02*   HGB  10.9*  10.7*   HCT  34.0*  33.2*   PLT  239  249   GRANS  85*  84*   LYMPH  11*  10*   EOS  0  0       Recent Labs      17   0700  17   0715   BUN  8  8   CREA  0.72  0.70 CA  7.6*  7.2*   ALB  2.3*  2.3*   K  3.8  3.8   NA  143  145   CL  109*  110*   CO2  27  26   GLU  99  98        Lab Results   Component Value Date/Time    Glucose 99 03/07/2017 07:00 AM    Glucose 98 03/06/2017 07:15 AM    Glucose 101 03/04/2017 04:20 AM    Glucose 90 03/02/2017 04:50 PM        Procedures/Imaging:  CT head 3/1  pCXR 3/2  MRI brain 3/2       [id number] [facility]  Hospitalist Division  Pager: [phone]

## 2017-03-08 NOTE — INTERDISCIPLINARY ROUNDS
IDR Summary      Patient: Ny Villalobos MRN: [id number]    Age: 39 y.o.  : 1980     Admit Diagnosis: Meningitis  Meningitis      Problems pertinent to hospital stay:     Consults:P.T, O.T. and Case Management     Testing due for patient today? YES    Nutrition plan:Yes     Mobility needs: Yes      Lines/Tubes:   IV: YES   Needed: YES  Gandhi: NO  Needed:NO  Central Line: NO Needed: NO      VTE Prophylaxis: Chemical    Influenza Vaccine received?  YES        Care Management:  Discharge plan: ARU vs SNF    PCP: Alethea Moreno MD    : NO  Financial concerns:No   Interventions:       LOS: 6 days     Expected days until discharge: today pending auth       Signed:     Debbie Banerjee, ALMAZ-BC  [address]  Hospitalist Division  Pager:  [phone]  Office:  [phone]

## 2017-03-08 NOTE — PROGRESS NOTES
[address] not received, Discharge order DC. PICC in place. Pt voiding to urinal.  Pt denies pain. 1910  Bedside and Verbal shift change report given to [address] [place] [address] by Sherrie Bowden RN. Report included the following information SBAR, Kardex, Intake/Output and MAR.

## 2017-03-08 NOTE — PROGRESS NOTES
1034:  Pt just finished with OT and requesting to participate in PT later today. Will follow up.    1420:  Pt getting PICC line at this time. Will follow up at schedule permits. 1513:  3rd attempt, pt refusing to participate at this time. Will follow up tomorrow.

## 2017-03-08 NOTE — PROGRESS NOTES
Neurology Progress Note      Patient: Britta Fierro. MRN: [id number]  SSN: [id number]    YOB: 1980  Age: 39 y.o. Sex: male      Admit Date: 3/2/2017    LOS: 6 days     Subjective:     He still has double vision and using the Eye patch. No headaches. The repeat MRI showed improvement. I reviewed her imaging today which revealed Persistent signal abnormality left thalamus and midbrain slightly improved  along the periphery with slight improvement in degree of midbrain expansion, with resolution of previous enhancement. Be Saldana He is going to Subacute facility today to finish his IV antibiotics. Objective:     Vitals:    03/08/17 0335 03/08/17 0339 03/08/17 0757 03/08/17 1114   BP:  136/77 125/68 123/74   Pulse: 60  (!) 57 73   Resp: 16 16 14 15   Temp: 97.6 °F (36.4 °C) 97.6 °F (36.4 °C) 97.7 °F (36.5 °C) 97.5 °F (36.4 °C)   SpO2: 95% 97% 98% 99%   Weight: 79 kg (174 lb 2.6 oz)      Height:                Physical Exam:   Awake and sitting in bed. Oriented to hospital and himself. Speech is mild dysarthric. Questionable right sided weakness. PERRL and EOMI. Dysconjugate gaze with left hypertropia and mild left exotropia. Difficulties with elevation of his right eye and Ok with horizontal movements. No focal weakness in the extremities and is able to lift above gravity. 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 .    Lab/Data Review: Labs reviewed. Review of systems: Denies chest pain, said headaches better.     Medications Reviewed:  Current Facility-Administered Medications   Medication Dose Route Frequency    bacitracin 500 unit/gram packet 1 Packet  1 Packet Topical PRN    sodium chloride (NS) flush 10-30 mL  10-30 mL InterCATHeter PRN    sodium chloride (NS) flush 10 mL  10 mL InterCATHeter Q24H    sodium chloride (NS) flush 10 mL  10 mL InterCATHeter PRN    sodium chloride (NS) flush 10-40 mL  10-40 mL InterCATHeter Q8H    sodium chloride (NS) flush 20 mL  20 mL InterCATHeter Q24H  pantoprazole (PROTONIX) tablet 40 mg  40 mg Oral DAILY    dexamethasone (DECADRON) tablet 4 mg  4 mg Oral Q6H    cyanocobalamin (VITAMIN B12) injection 1,000 mcg  1,000 mcg IntraMUSCular EVERY OTHER DAY    tamsulosin (FLOMAX) capsule 0.4 mg  0.4 mg Oral DAILY    acetaminophen (TYLENOL) tablet 650 mg  650 mg Oral Q4H PRN    enoxaparin (LOVENOX) injection 40 mg  40 mg SubCUTAneous Q24H    buPROPion XL (WELLBUTRIN XL) tablet 300 mg  300 mg Oral 7am    0.9% sodium chloride infusion  100 mL/hr IntraVENous CONTINUOUS    cefTRIAXone (ROCEPHIN) 2 g in 0.9% sodium chloride (MBP/ADV) 50 mL MBP  2 g IntraVENous Q12H     Past Medical History:   Diagnosis Date    Arm pain     CVA (cerebrovascular accident) ([facility] [address] 2012    Meningitis     Meningitis     Neurosarcoidosis ([facility] [address] 2012     Social History     Social History    Marital status: SINGLE     Spouse name: N/A    Number of children: N/A    Years of education: N/A     Occupational History    Not on file. Social History Main Topics    Smoking status: Current Some Day Smoker     Packs/day: 0.25    Smokeless tobacco: Never Used    Alcohol use No    Drug use: No    Sexual activity: Not Currently     Other Topics Concern    Not on file     Social History Narrative     MRI Results (most recent):    Results from Hospital Encounter encounter on 03/02/17   MRI BRAIN W WO CONT   Narrative          Impression Impression:    1. Abnormal edematous signal changes with associated mild expansion involvingleft thalamus and left midbrain with ventral peripheral enhancement, withoutrestricted diffusion signal. Given history of prior neurosarcoidosis, thiscertainly could represent parenchymal neurosarcoidosis. Differential diagnosisincludes tumefactive demyelination, with no other evidence of multiple  sclerosis. Less likely possibility includes neoplasm. Further evaluationrecommended with CSF analysis and follow-up MR imaging following treatment. Initial report was given by the radiologist on call at 8:00 PM 3/2/2017.    2. Associated narrowing of the third ventricle and upper aqueduct due to leftthalamic and midbrain process, without hydrocephalus. 3. Chronic right thalamic lacunar infarct and nonspecific upper pontine signalchanges, possible sequela of previous chronic microvascular disease ordemyelination. Chronic findings within right thalamus could also represent sequela of remote involvement of previous neurosarcoidosis. Assessment/Plan:     Principal Problem:    Meningitis (3/2/2017)    Active Problems:    Neurosarcoidosis ([facility] [address] (10/17/2016)      Headache (3/3/2017)    Neurosarcoidosis diagnosis by prior workup in [place] , affecting brainstem/thalamus. Now wiht flareup affecting the same area. Getting better with Decadron clinically and in his MRI . Will taper his Decadron today to 4 mg q 8hr for 1 week, then q12 for 1 week then switch him to Prednisone 40 mg qam.  He may start Imuran 50 mg every day then increase it to 50 mg bid in 1 month . He will have TPMP genotyping before starting Imuran. He will need to follow up his outpatient neurologist.     Possible superimposed bacterial meningitis could not excluded , so he will finish the course of his antibiotics. CSF Fungal is neg  and AFB smear negative and cultures pending. Diplopia: due to midbrain lesion. Will keep current systemic treatment for neurosarcoidosis. Blurred vision: Decreased vision in the left due to his cataract, most likely from the steroid. B12 was borderline,  On supplement.               Signed By: Darrell Roth MD     March 8, 2017        +

## 2017-03-08 NOTE — PROGRESS NOTES
Assumed pt care. Patient not in room, pt in MRI. Visitors at bedside. 1935> patient back on the unit from MRI. Received pt alert and oriented, denies pain at this time. No s/s of distress. Call bell within reach. 0540> patient incontinent of urine, bathed patient, condom catheter applied. 0743> Bedside and Verbal shift change report given to Rebecca Ramey (oncoming nurse) by Jose Silveira   (offgoing nurse). Report included the following information SBAR, Kardex, Intake/Output and MAR.

## 2017-03-08 NOTE — PROGRESS NOTES
Posted in edc after  Speaking with mother, plan snf , will need ins Monique Felder /[facility] first choice.

## 2017-03-08 NOTE — PROGRESS NOTES
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 Infectious vs sec to neuro-sarcoidosis  - off acyclovir as HSV PCR neg , VDRL/ lyme titers neg   - Neuro considering superimposed bacterial infection (sig elevated WBC in CSF and h/o receiving dose of Abx PTA) on top of Neurosarcoidosis flare.  - Will continue CNS dose Ceftriaxone. plan 2 weeks of Abx [ 8 more days ]  - OPAT written, will get PICC today  - AFB smear neg, fungal cx ntd (Was on cellcept, but took himself off )   - on decadron per neurology for neurosarcoidosis , repeat MRI 3/8 shows improvement   - okay to start imuran from ID stand point.  - d/w Dr Arthur Hooper    Leucocytosis - Monitor, resolving  - on steroids currently   Fever - Improving   Neuro-Sarcoidosis- unclear if took steroids on regular basis or not  - Was on Cellcept but non compliant and took himself off.  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  - won't add to allergy list   Depression     Left nunu-paresis from residual stroke/neurosarcoidosis        MICROBIOLOGY:   3/2 blcx x2 ntd,        UA neg       influenza neg,        RPR NR       CSF crypto Ag neg, BAP neg, neg CX, HSV 1/2 PCR neg, VDRL/Lyme neg     LINES/DRAINS:   PIV    MEDICATIONS:     Current Facility-Administered Medications   Medication Dose Route Frequency    pantoprazole (PROTONIX) tablet 40 mg  40 mg Oral DAILY    dexamethasone (DECADRON) tablet 4 mg  4 mg Oral Q6H    cyanocobalamin (VITAMIN B12) injection 1,000 mcg  1,000 mcg IntraMUSCular EVERY OTHER DAY    tamsulosin (FLOMAX) capsule 0.4 mg  0.4 mg Oral DAILY    acetaminophen (TYLENOL) tablet 650 mg  650 mg Oral Q4H PRN    enoxaparin (LOVENOX) injection 40 mg  40 mg SubCUTAneous Q24H    buPROPion XL (WELLBUTRIN XL) tablet 300 mg  300 mg Oral 7am    0.9% sodium chloride infusion  100 mL/hr IntraVENous CONTINUOUS    cefTRIAXone (ROCEPHIN) 2 g in 0.9% sodium chloride (MBP/ADV) 50 mL MBP  2 g IntraVENous Q12H       SUBJECTIVE :     Interval notes reviewed. Afebrile. Denies any headache, indicates that his left sided weakness and eye vision is slightly better now. OBJECTIVE     Visit Vitals    /68 (BP 1 Location: Right arm, BP Patient Position: At rest)    Pulse (!) 57    Temp 97.7 °F (36.5 °C)    Resp 14    Ht 6' 2\" (1.88 m)    Wt 79 kg (174 lb 2.6 oz)    SpO2 98%    BMI 22.36 kg/m2       Temp (24hrs), Av.8 °F (36.6 °C), Min:97.6 °F (36.4 °C), Max:98 °F (36.7 °C)      Gen-No distress, lying in bed comfortably  HENT- No thrush, Left eye with patch  Chest- Symmetric expansion, CTA  CV-S1S2 RR, No JVD, No edema  Abd-Soft, NT, ND, BS+  Skin-No jaundice, cyanosis, clubbing.  No decubitus  Ext - no edema   Cns - left sided hemiparesis, awake, alert, patch over left eye    Labs: Results:   Chemistry Recent Labs      17   0717   0715   GLU  91  99  98   NA  141  143  145   K  3.5  3.8  3.8   CL  106  109*  110*   CO2  26  27  26   BUN  9  8  8   CREA  0.65  0.72  0.70   CA  7.9*  7.6*  7.2*   AGAP  9  7  9   BUCR  14  11*  11*   AP  66  66  64   TP  5.3*  5.3*  5.5*   ALB  2.4*  2.3*  2.3*   GLOB  2.9  3.0  3.2   AGRAT  0.8  0.8  0.7*      CBC w/Diff Recent Labs      17   0717   0715   WBC  9.9  10.0  10.3   RBC  4.14*  4.14*  4.02*   HGB  11.1*  10.9*  10.7*   HCT  34.6*  34.0*  33.2*   PLT  261  239  249   GRANS  84*  85*  84*   LYMPH  9*  11*  10*   EOS  0  0  0          RADIOLOGY :      Imaging    Results from Hospital Encounter encounter on 17   XR CHEST PORT   Narrative CHEST AP PORTABLE    Indication: Chest pain. Comparison: None. Findings: The lungs appear clear. No evidence for pneumothorax or pleural  effusion. Cardiac silhouette and pulmonary vascularity appear within normal  limits. Impression IMPRESSION:    No acute cardiopulmonary disease. Results from [place] encounter on 03/01/17   CT HEAD WO CONT   Addendum Addendum:   One or more dose reduction techniques were used on this CT: automated  exposure control, adjustment of the mAs and/or kVp according to patient's size, and iterative reconstruction techniques. The specific techniques utilized on  this CT exam have been documented in the patient's electronic medical record. Fiorella Mathews MD 3/1/2017  3:40 PM             Narrative CT HEAD WITHOUT CONTRAST    History:New onset of diplopia possible acute stroke history of neurosarcoid    CT Technique:    Serial axial noncontrast head CT was performed from base of skull to vertex. Comparison:  No prior exam available. CT Findings: There are several subtle small nodular foci of hypodensity somewhat poorly  defined within the central gray primarily in the right thalamus and adjacent  genu and posterior limb internal capsule possibly also minimally in the left  thalamus    Brain parenchyma appears of normal density without discrete hematoma, extra  axial collection, focal mass effect, or midline shift. Cerebral sulci and ventricles appear within normal limits in size and  configuration for patient age. Visible bony calvarium appears grossly unremarkable. Impression IMPRESSION:    1. No prior exams. Several small poorly defined foci of hypodensity, central  gray asymmetrically bilaterally, primarily in right thalamus and adjacent  posterior limb internal capsule, may represent lacunar infarcts, age  indeterminate. Recommend contrast enhanced MRI head for clarification. No  evident acute hemorrhage or focal mass effect. I have independently examined the patient and reviewed all lab studies and imgaing as well as review of nursing notes and physican notes from the past 24 hours. The plan of care has been discussed with the patient/relative and all questions are answered.      Kyler Vyas MD  March 8, 2017    [facility]  [phone]

## 2017-03-08 NOTE — DISCHARGE SUMMARY
Discharge Summary    Patient: Tana Dotson. Sex: male          DOA: 3/2/2017         YOB: 1980      Age:  39 y.o.        LOS:  LOS: 6 days                Admit Date: 3/2/2017    Discharge Date: 3/8/2017    Admission Diagnoses: Meningitis  Meningitis    Discharge Diagnoses:   1-Neurosarcoidosis   2-Possible bacterial meningitis  3-Red man syndrome  4-Fever  5-Diplopia  6-Cataract  7-Urinary urgency    Problem List as of 3/8/2017  Date Reviewed: 3/2/2017          Codes Class Noted - Resolved    Headache ICD-10-CM: R51  ICD-9-CM: 784.0  3/3/2017 - Present        * (Principal)Meningitis ICD-10-CM: G03.9  ICD-9-CM: 322.9  3/2/2017 - Present        Advance care planning ICD-10-CM: Z71.89  ICD-9-CM: V65.49  11/17/2016 - Present    Overview Signed 11/17/2016  3:20 PM by Jerardo Alexandra MD     Pt names his mother as his power of ; he is full code             Neurosarcoidosis [facility]) ICD-10-CM: D86.89  ICD-9-CM: 135  10/17/2016 - Present        Left hemiparesis ([facility] [address] ICD-10-CM: G81.94  ICD-9-CM: 342.90  10/17/2016 - Present              Discharge Medications:  Ceftriaxone 2 gm iv q 12 hrs x 8 days  Flomax 0.4 mg po daily   Decadron 4 mg po q 8 hrs x 1 week. Then 4 mg po q 12 hrs for 1 week. Then start tapering prednisone starting with 40 mg daily. Imuran 50 mg po daily,then increase dose as per neurologist  Current Discharge Medication List      CONTINUE these medications which have NOT CHANGED    Details      acetaminophen-codeine (TYLENOL #3) 300-30 mg per tablet Take 1 Tab by mouth every four (4) hours as needed for Pain. Max Daily Amount: 6 Tabs. Qty: 20 Tab, Refills: 0    Associated Diagnoses: Intractable headache, unspecified chronicity pattern, unspecified headache type; Acute left ankle pain         buPROPion XL (WELLBUTRIN XL) 150 mg tablet Take 300 mg by mouth every morning.          STOP taking these medications       cefTRIAXone (ROCEPHIN) 1 gram injection Comments:   Reason for Stopping:         ketorolac tromethamine (TORADOL) 60 mg/2 mL soln Comments:   Reason for Stopping: Follow-up:pcp and neurologist                     infectious disease. Discharge Condition:good    Activity:as tolerated    Diet:regular    Labs:  Labs: Results:       Chemistry Recent Labs      03/08/17 0448 03/07/17   0700 03/06/17   0715   GLU  91  99  98   NA  141  143  145   K  3.5  3.8  3.8   CL  106  109*  110*   CO2  26  27  26   BUN  9  8  8   CREA  0.65  0.72  0.70   CA  7.9*  7.6*  7.2*   AGAP  9  7  9   BUCR  14  11*  11*   AP  66  66  64   TP  5.3*  5.3*  5.5*   ALB  2.4*  2.3*  2.3*   GLOB  2.9  3.0  3.2   AGRAT  0.8  0.8  0.7*      CBC w/Diff Recent Labs      03/08/17 0448 03/07/17 0700 03/06/17   0715   WBC  9.9  10.0  10.3   RBC  4.14*  4.14*  4.02*   HGB  11.1*  10.9*  10.7*   HCT  34.6*  34.0*  33.2*   PLT  261  239  249   GRANS  84*  85*  84*   LYMPH  9*  11*  10*   EOS  0  0  0      Cardiac Enzymes No results for input(s): CPK, CKND1, BRAYDON in the last 72 hours. No lab exists for component: CKRMB, TROIP   Coagulation No results for input(s): PTP, INR, APTT in the last 72 hours. No lab exists for component: INREXT    Lipid Panel Lab Results   Component Value Date/Time    Cholesterol, total 146 11/17/2016 03:33 PM    HDL Cholesterol 39 11/17/2016 03:33 PM    LDL, calculated 81.4 11/17/2016 03:33 PM    VLDL, calculated 25.6 11/17/2016 03:33 PM    Triglyceride 128 11/17/2016 03:33 PM    CHOL/HDL Ratio 3.7 11/17/2016 03:33 PM      BNP No results for input(s): BNPP in the last 72 hours. Liver Enzymes Recent Labs      03/08/17 0448   TP  5.3*   ALB  2.4*   AP  66   SGOT  11*      Thyroid Studies Lab Results   Component Value Date/Time    TSH 0.28 03/02/2017 04:04 PM          Imaging:  CT scan of head on 3/1/2017:  1. No prior exams.  Several small poorly defined foci of hypodensity, central  gray asymmetrically bilaterally, primarily in right thalamus and adjacent  posterior limb internal capsule, may represent lacunar infarcts, age  indeterminate. Recommend contrast enhanced MRI head for clarification. No  evident acute hemorrhage or focal mass effect. MRI of brain on 3/2/2017:  1. Abnormal edematous signal changes with associated mild expansion involving  left thalamus and left midbrain with ventral peripheral enhancement, without  restricted diffusion signal. Given history of prior neurosarcoidosis, this  certainly could represent parenchymal neurosarcoidosis. Differential diagnosis  includes tumefactive demyelination, with no other evidence of multiple  sclerosis. Less likely possibility includes neoplasm. Further evaluation  recommended with CSF analysis and follow-up MR imaging following treatment. Initial report was given by the radiologist on call at 8:00 PM 3/2/2017.     2. Associated narrowing of the third ventricle and upper aqueduct due to left  thalamic and midbrain process, without hydrocephalus.     3. Chronic right thalamic lacunar infarct and nonspecific upper pontine signal  changes, possible sequela of previous chronic microvascular disease or  demyelination. Chronic findings within right thalamus could also represent  sequela of remote involvement of previous neurosarcoidosis. MRA of brain on 3/6/2017:  Unremarkable brain MRA, no significant intracranial stenosis.       MRI of brain on 3/7/2017:  1. Persistent signal abnormality left thalamus and midbrain slightly improved  along the periphery with slight improvement in degree of midbrain expansion,  with resolution of previous enhancement. Differential diagnosis continues to  include site of involvement of neurosarcoidosis or demyelinating process. Infectious etiology of cerebritis not entirely excluded.  Given improvement in  previous enhancement, neoplasm less likely.     2. Stable areas of chronic signal changes right thalamus and tom which may  represent sequela of previous chronic neurosarcoidosis or areas of chronic  infarct or demyelination.     3. No evidence of new acute intracranial finding or other significant change. Consults:   Neurology  ID    Treatment Team: Treatment Team: Attending Provider: Adam Peralta MD; Consulting Provider: Yen Bradshaw MD; Consulting Provider: Prudence Nyhan, MD; Consulting Provider: Kayleigh Asencio MD; Utilization Review: Kirk Newby RN; Occupational Therapy Assistant: ANKIT Heard; Physical Therapy Assistant: Trae Maddox PTA    Significant Diagnostic Studies:See recent lab result. Hospital Course:  39 y.o. male with pmhx of neurosarcoidosis, depression, left hemiparesis, meningitis, stroke who presents with headache and left eye diplopia onset 3 days. Very poor historian with mild dysarthria. Baseline mentation unknown. Very poor medical insight. Apparently he had a similar presentation back in [facility] in 2011, and 2014. Neurosacroid was diagnosed and patient was on immunosuppression with cellcept but took him self off this over 1 yr ago. In ER LP suggestive of infectious process bacterial vs viral . LP and CSF analysis with CSF protein 103, CSF , CSF glucose 46, CSF RBC 8. CSF culture and gram stain and Blood culture negative. Patient was started on Rocephin IV, Vancomycin IV and Acyclovir. ID and Neurology consulted. Acyclovir stopped 3/4 with negative HSV on CSF. Further CSF studies have been negative for infection supporing neurosarcoidosis as the primary etiology. Vanc was d/kel. Patient was kept on rocephin as neurologist was considering superimposed bacterial infection meningitis (elevated WBC in CSF and h/o receiving atbx PTA) on top of neurosarcoidosis. ID has indicated that patient will need atbx for 2 weeks. On 3/6/2017,opthalmologist saw patient because of double vision and blurred vision. There were no evidence of posterior uveitis from the sarcoidosis. Ophthalmologist concluded that the diplopia should resolve as the sarcoid granulomas resolve. She also indicated that the blurred vision OS was from cataract,most likely from the steroid. MRI of brain repeated on 3/7/2017,showed improvement compared to previous(see MRI results above). Neurologist will resume imuran as agreed by ID. Patient will be on decadron,dose determined by neurologist.Then later will go on prednisone after seen by her neurologist.Patient has shown improvement and no more headaches. He is clinically stable for discharge. P/E  NAD. Heent:blurred vision  Lungs ctab  Heart s1s2 nl  Abdm:soft,not tender,bs present  Extr:no edema. Neuro:aaox3,blurred vision. Time for discharge:50 minutes  Case discussed with neurology.     Esmer Finley MD  March 8, 2017

## 2017-03-08 NOTE — PROGRESS NOTES
Problem: Self Care Deficits Care Plan (Adult)  Goal: *Acute Goals and Plan of Care (Insert Text)  Occupational Therapy Goals  Initiated 3/3/2017 within 7 day(s). 1. Patient will perform grooming tasks with supervision/set-up   2. Patient will perform upper body dressing with supervision/set-up. 3. Patient will perform lower body dressing with moderate assistance . 4. Patient will perform toilet transfers with supervision/set-up. 5. Patient will perform all aspects of toileting with supervision/set-up. 6. Patient will participate in upper extremity therapeutic exercise/activities with minimal assistance/contact guard assist for 8 minutes to increase/maintain BUE function for ADLs. 7. Patient will utilize energy conservation techniques during functional activities with verbal cues. Outcome: Progressing Towards Goal  OCCUPATIONAL THERAPY TREATMENT     Patient: Chastity Muro (40 y.o. male)  Date: 3/8/2017  Diagnosis: Meningitis  Meningitis Meningitis       Precautions: Fall, Contact, Other (comment) (droplet)  Chart, occupational therapy assessment, plan of care, and goals were reviewed. ASSESSMENT:  Pt demonstrates energy conservation and nunu-techniques donning/doffing socks seated EOB, however poor dynamic sitting balance requires constant support to maintain midline. Pt w/poor dynamic standing balance w/functional mobility to bathroom requiring mac verbal/tactile cues for safety. Pt may benefit form nunu-walker 2/2 decrease  strength LUE. Pt tolerating LUE palm protector. Pt tolerates standing sinkside performing hand hygiene and requires assist w/soap dispenser mgt 2/2 poor dynamic standing balance. Pt declines OOB to chair 2/2 c/o continued dipolpia and HA. Alerted NSG. EDUCATION Pt educated on safety w/RW and functional mobility.   Progression toward goals:  [ ]          Improving appropriately and progressing toward goals  [X]          Improving slowly and progressing toward goals  [ ] Not making progress toward goals and plan of care will be adjusted       PLAN:  Patient continues to benefit from skilled intervention to address the above impairments. Continue treatment per established plan of care. Discharge Recommendations:  Tony Yee  Further Equipment Recommendations for Discharge:  shower chair and rolling walker, as determined by SNF staff       SUBJECTIVE:   Patient stated I'm all right.       OBJECTIVE DATA SUMMARY:         Cognitive/Behavioral Status:  Neurologic State: Alert  Orientation Level: Oriented X4  Cognition: Appropriate for age attention/concentration, Follows commands  Safety/Judgement: Fall prevention  Functional Mobility and Transfers for ADLs:              Bed Mobility:  Supine to Sit: Supervision  Sit to Supine: Modified independent              Transfers:  Sit to Stand: Contact guard assistance              Bathroom Mobility: Minimum assistance; Moderate assistance (w/RW)              Balance:  Sitting: Impaired  Sitting - Static: Fair (occasional)  Sitting - Dynamic: Poor (constant support)  Standing: With support; Impaired  Standing - Static: Fair  Standing - Dynamic : Poor  ADL Intervention:  Grooming  Washing Hands: Minimum assistance (requires assist w/hand soap mgt)     Upper Body [address] Gown: Contact guard assistance; Compensatory technique training     Lower Body Dressing Assistance  Socks: Minimum assistance; Compensatory technique training  Leg Crossed Method Used: Yes  Position Performed: Seated edge of bed  Cues: Don;Doff     Pain:  Pre Treatment:0  Post Treatment:0  Pain Scale 1: Numeric (0 - 10)  Pain Intensity 1: 0     Activity Tolerance:    Fair     Please refer to the flowsheet for vital signs taken during this treatment.   After treatment:   [ ]  Patient left in no apparent distress sitting up in chair  [X]  Patient left in no apparent distress in bed  [X]  Call bell left within reach  [X]  Nursing notified  [ ] Caregiver present  [ ]  Bed alarm activated     ANKIT Quach  Time Calculation: 30 mins

## 2017-03-09 NOTE — PROGRESS NOTES
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 Infectious vs sec to neuro-sarcoidosis  - off acyclovir as HSV PCR neg , VDRL/ lyme titers neg   - Neuro considering superimposed bacterial infection (sig elevated WBC in CSF and h/o receiving dose of Abx PTA) on top of Neurosarcoidosis flare.   - repeat MRI brain 3/8 shows improvemement of enhancement - Will continue CNS dose Ceftriaxone. plan 2 weeks of Abx [ 7 more days ]  - OPAT written, may go to [facility], Check weekly CBC, BMP while on abx.  - AFB smear neg, fungal cx ntd (Was on cellcept, but took himself off )  - okay to start imuran from ID stand point. Leucocytosis - Monitor, resolving  - on steroids currently   Fever - Improving   Neuro-Sarcoidosis- unclear if took steroids on regular basis or not  - Was on Cellcept but non compliant and took himself off.  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        MICROBIOLOGY:   3/2 blcx x2 ntd,        UA neg       influenza neg,        RPR NR       CSF crypto Ag neg, BAP neg, neg CX, HSV 1/2 PCR neg, VDRL/Lyme neg     LINES/DRAINS:   PICC 3/8     MEDICATIONS:     Current Facility-Administered Medications   Medication Dose Route Frequency    bacitracin 500 unit/gram packet 1 Packet  1 Packet Topical PRN    sodium chloride (NS) flush 10-30 mL  10-30 mL InterCATHeter PRN    sodium chloride (NS) flush 10 mL  10 mL InterCATHeter Q24H    sodium chloride (NS) flush 10 mL  10 mL InterCATHeter PRN    sodium chloride (NS) flush 10-40 mL  10-40 mL InterCATHeter Q8H    sodium chloride (NS) flush 20 mL  20 mL InterCATHeter Q24H    pantoprazole (PROTONIX) tablet 40 mg  40 mg Oral DAILY    dexamethasone (DECADRON) tablet 4 mg  4 mg Oral Q6H    cyanocobalamin (VITAMIN B12) injection 1,000 mcg  1,000 mcg IntraMUSCular EVERY OTHER DAY    tamsulosin (FLOMAX) capsule 0.4 mg  0.4 mg Oral DAILY    acetaminophen (TYLENOL) tablet 650 mg  650 mg Oral Q4H PRN    enoxaparin (LOVENOX) injection 40 mg  40 mg SubCUTAneous Q24H    buPROPion XL (WELLBUTRIN XL) tablet 300 mg  300 mg Oral 7am    cefTRIAXone (ROCEPHIN) 2 g in 0.9% sodium chloride (MBP/ADV) 50 mL MBP  2 g IntraVENous Q12H       SUBJECTIVE :     Interval notes reviewed. Afebrile. Denies any headache, indicates feeling better overall. OBJECTIVE     Visit Vitals    /86 (BP 1 Location: Left arm, BP Patient Position: At rest)    Pulse (!) 58    Temp 97.9 °F (36.6 °C)    Resp 14    Ht 6' 2\" (1.88 m)    Wt 79 kg (174 lb 2.6 oz)    SpO2 99%    BMI 22.36 kg/m2       Temp (24hrs), Av.6 °F (36.4 °C), Min:97.3 °F (36.3 °C), Max:98.1 °F (36.7 °C)      Gen-No distress, lying in bed comfortably  HENT- No thrush, Left eye with patch  Chest- Symmetric expansion, CTA  CV-S1S2 RR, No JVD, No edema  Abd-Soft, NT, ND, BS+  Skin-No jaundice, cyanosis, clubbing.  No decubitus  Ext - no edema   Cns - left sided hemiparesis, awake, alert, patch over left eye    Labs: Results:   Chemistry Recent Labs      17   0448  17   0700   GLU  91  99   NA  141  143   K  3.5  3.8   CL  106  109*   CO2  26  27   BUN  9  8   CREA  0.65  0.72   CA  7.9*  7.6*   AGAP  9  7   BUCR  14  11*   AP  66  66   TP  5.3*  5.3*   ALB  2.4*  2.3*   GLOB  2.9  3.0   AGRAT  0.8  0.8      CBC w/Diff Recent Labs      17   0448  17   0700   WBC  9.9  10.0   RBC  4.14*  4.14*   HGB  11.1*  10.9*   HCT  34.6*  34.0*   PLT  261  239 GRANS  84*  85*   LYMPH  9*  11*   EOS  0  0          RADIOLOGY :      Imaging    Results from Hospital Encounter encounter on 03/02/17   XR CHEST PORT   Narrative CHEST AP PORTABLE    Indication: Chest pain. Comparison: None. Findings: The lungs appear clear. No evidence for pneumothorax or pleural  effusion. Cardiac silhouette and pulmonary vascularity appear within normal  limits. Impression IMPRESSION:    No acute cardiopulmonary disease. Results from [place] encounter on 03/01/17   CT HEAD WO CONT   Addendum Addendum:   One or more dose reduction techniques were used on this CT: automated  exposure control, adjustment of the mAs and/or kVp according to patient's size, and iterative reconstruction techniques. The specific techniques utilized on  this CT exam have been documented in the patient's electronic medical record. Ronda Perez MD 3/1/2017  3:40 PM             Narrative CT HEAD WITHOUT CONTRAST    History:New onset of diplopia possible acute stroke history of neurosarcoid    CT Technique:    Serial axial noncontrast head CT was performed from base of skull to vertex. Comparison:  No prior exam available. CT Findings: There are several subtle small nodular foci of hypodensity somewhat poorly  defined within the central gray primarily in the right thalamus and adjacent  genu and posterior limb internal capsule possibly also minimally in the left  thalamus    Brain parenchyma appears of normal density without discrete hematoma, extra  axial collection, focal mass effect, or midline shift. Cerebral sulci and ventricles appear within normal limits in size and  configuration for patient age. Visible bony calvarium appears grossly unremarkable. Impression IMPRESSION:    1. No prior exams.  Several small poorly defined foci of hypodensity, central  gray asymmetrically bilaterally, primarily in right thalamus and adjacent  posterior limb internal capsule, may represent lacunar infarcts, age  indeterminate. Recommend contrast enhanced MRI head for clarification. No  evident acute hemorrhage or focal mass effect. I have independently examined the patient and reviewed all lab studies and imgaing as well as review of nursing notes and physican notes from the past 24 hours. The plan of care has been discussed with the patient/relative and all questions are answered.      Ny Adiran MD  March 9, 2017    [facility] AT THE [facility]  [phone]

## 2017-03-09 NOTE — PROGRESS NOTES
auth obtained, pt will transfer to [facility]. Notified Dr Hany Clemens, pt and mother.

## 2017-03-09 NOTE — PROGRESS NOTES
Problem: Mobility Impaired (Adult and Pediatric)  Goal: *Acute Goals and Plan of Care (Insert Text)  Physical Therapy Goals  Initiated 3/3/2017 and to be accomplished within 7 day(s)  1. Patient will move from supine to sit and sit to supine , scoot up and down and roll side to side in bed with modified independence. 2. Patient will transfer from bed to chair and chair to bed with modified independence using the least restrictive device. 3. Patient will perform sit to stand with modified independence. 4. Patient will ambulate with supervision/set-up for 100 feet with the least restrictive device. 5. Patient will ascend/descend 14 stairs with handrail(s) with supervision/set-up. Patient refusing on both attempts at 0932 and 1210. When asked why, pt states \"I'm lazy. \" will f/u tomorrow if pt has not transferred to [facility].

## 2017-03-09 NOTE — PROGRESS NOTES
Patient received in bed awake with eye patch to left eye. Patient A&Ox4, denies pain and discomfort. No distress noted. Frequently use items within reach. Bed locked in low position. Call bell within reach and Patient verbalized understanding of use for assistance and needs. 1330- Dr. Kapoor Netters to [facility] station made aware of Bladder scan 644 and urine output since this am 600 ml including incontinence episode; voiced understanding. Aniceto Varela RN ([facility]) was called made aware. Report had already been called to Aniceto Varela ([facility]) . 1715- Pt discharge to [facility] v/a w/c accompanied by CNA and Mother via of w/c. Pt has discharge instructions along with belongings. No distress noted.

## 2017-03-09 NOTE — IP AVS SNAPSHOT
Current Discharge Medication List  
  
START taking these medications Dose & Instructions Dispensing Information Comments Morning Noon Evening Bedtime  
 predniSONE 10 mg tablet Commonly known as:  Trina Fox Start taking on:  3/24/2017 Your last dose was: Your next dose is:    
   
   
 Dose:  10 mg Take 1 Tab by mouth daily (with breakfast). Quantity:  100 Tab Refills:  0  
     
   
   
   
  
 tamsulosin 0.4 mg capsule Commonly known as:  FLOMAX Your last dose was: Your next dose is:    
   
   
 Dose:  0.4 mg Take 1 Cap by mouth daily. Quantity:  60 Cap Refills:  0 CONTINUE these medications which have CHANGED Dose & Instructions Dispensing Information Comments Morning Noon Evening Bedtime buPROPion  mg XL tablet Commonly known as:  Johnita Plump What changed:  medication strength Your last dose was: Your next dose is:    
   
   
 Dose:  300 mg Take 1 Tab by mouth every morning. Quantity:  60 Tab Refills:  0  
     
   
   
   
  
 dexamethasone 4 mg tablet Commonly known as:  DECADRON What changed:  additional instructions Your last dose was: Your next dose is: One pill twice a day Quantity:  8 Tab Refills:  0 CONTINUE these medications which have NOT CHANGED Dose & Instructions Dispensing Information Comments Morning Noon Evening Bedtime  
 acetaminophen-codeine 300-30 mg per tablet Commonly known as:  TYLENOL #3 Your last dose was: Your next dose is:    
   
   
 Dose:  1 Tab Take 1 Tab by mouth every four (4) hours as needed for Pain. Max Daily Amount: 6 Tabs. Quantity:  60 Tab Refills:  0  
     
   
   
   
  
 azaTHIOprine 50 mg tablet Commonly known as:  [employer] Your last dose was: Your next dose is:    
   
   
 Dose:  50 mg Take 1 Tab by mouth daily. Quantity:  60 Tab Refills:  0 ASK your doctor about these medications Dose & Instructions Dispensing Information Comments Morning Noon Evening Bedtime  
 bacitracin 500 unit/gram ointment Your last dose was: Your next dose is:    
   
   
 Dose:  1 Packet Apply 1 Packet to affected area as needed (Catheter Removal Dressing). Quantity:  1 Packet Refills:  0  
     
   
   
   
  
 cefTRIAXone 2 gram 2 g IVPB Your last dose was: Your next dose is:    
   
   
 Dose:  2 g  
2 g by IntraVENous route every twelve (12) hours every twelve (12) hours. Quantity:  16 Dose Refills:  0 Where to Get Your Medications Information on where to get these meds will be given to you by the nurse or doctor. ! Ask your nurse or doctor about these medications  
  acetaminophen-codeine 300-30 mg per tablet  
 azaTHIOprine 50 mg tablet buPROPion  mg XL tablet  
 dexamethasone 4 mg tablet  
 predniSONE 10 mg tablet  
 tamsulosin 0.4 mg capsule

## 2017-03-09 NOTE — PROGRESS NOTES
2004 Attempted straight cath on patient with report of retention and pressure and bladder scan volume >700 mL. No success as the catheter is coiling upon insertion. 2104 Karl Espana made a second attempt at straight catheterizing the patient with successful insertion, but no urine return. Will page the on-call hospitalist for guidance. 2111 Dr. Kayleen Freed paged on patient with urinary retention and inability to empty bladder following two straight cath attempts. Per Dr. Chris Dill instruction, we will wait another hour and attempt to place boland catheter for draining overnight and reassess in the a.m.   2136 Patient voided 200 mL clear yellow urine, wishes to wait for now for boland insertion. Will continue to monitor. 2318 Patient voided 200 mL clear yellow urine  0128 Medications administered, patient incontinent of very large volume of urine, bed completely saturated. Patient bathed and linens changed. Bladder scanned revealing 894 mL in the bladder. Patient made aware that we need to go ahead and insert a boland catheter. 0149 Attempted to insert a 16 FR boland with Tyra Metz RN as second nurse at bedside. Met resistance again and could not advance to boland. Nursing supervisor paged for smaller catheter. [address] Radha Burch RN (nursing supervisor) placed 14 FR coude catheter with very small amount of clear yellow urine return. Patient still reporting some discomfort. Will seek urology consult in the morning. 8804 Patient requesting boland be d/c'd because \"It's irritating me\". Educated patient that we are uncertain as to the depth of is voiding difficulties so it would be best to leave the catheter in until a urologist is able to assess him. Patient insistent that he will not be able to wait until morning and requesting it be taken out now. 9115 Dr. Kayleen Freed page for guidance  2004 Patient called out several times to request boland be d/c'd. Dr. Chris Dill paged again.   4094 Still awaiting call from  Radha Gandhi d/c'd at patient request.

## 2017-03-09 NOTE — PROGRESS NOTES
See today discharge summary for progress notes. Patient is not discharged since the approval could not be obtained from insurance

## 2017-03-09 NOTE — IP AVS SNAPSHOT
[facility] 
 
 
 [address] [address] Patient: Ny Rivera. MRN: [id number] You are allergic to the following No active allergies Recent Documentation Height Weight BMI Smoking Status 1.88 m 80.5 kg 22.78 kg/m2 Current Some Day Smoker Emergency Contacts Name Discharge Info Relation Home Work Mobile Qing Carrera DISCHARGE CAREGIVER [3] Mother [14] [phone] About your hospitalization You were admitted on:  March 9, 2017 You last received care in the:  [facility] [address] [place] [address] You were discharged on:  March 19, 2017 Unit phone number:  [phone] Why you were hospitalized Your primary diagnosis was:  Not on File Providers Seen During Your Hospitalizations Provider Role Specialty Primary office phone Dion Vigil MD Attending Provider Geriatric Medicine [phone] Your Primary Care Physician (PCP) Primary Care Physician Office Phone Office Fax Alex 39, Tamara 70 Follow-up Information Follow up With Details Comments Contact Info Alethea Moreno MD  Follow up on 3/27/17 @1:30pm  [id number] [place] [address] [address] [address] [address] [address] [phone] Your Appointments Monday March 27, 2017  1:30 PM EDT HOSPITAL FOLLOW-UP with Alethea Moreno MD  
[address] [place] [address] [id number] [place] [address] [address] [facility] [address]  
[phone] Monday April 03, 2017  8:40 AM EDT Follow Up with Megha Yang MD  
[facility] [address] [facility] [address]  
[phone] Current Discharge Medication List  
  
START taking these medications Dose & Instructions Dispensing Information Comments Morning Noon Evening Bedtime predniSONE 10 mg tablet Commonly known as:  Jeremiah Gutiérrez Start taking on:  3/24/2017 Your last dose was: Your next dose is:    
   
   
 Dose:  10 mg Take 1 Tab by mouth daily (with breakfast). Quantity:  100 Tab Refills:  0  
     
   
   
   
  
 tamsulosin 0.4 mg capsule Commonly known as:  FLOMAX Your last dose was: Your next dose is:    
   
   
 Dose:  0.4 mg Take 1 Cap by mouth daily. Quantity:  60 Cap Refills:  0 CONTINUE these medications which have CHANGED Dose & Instructions Dispensing Information Comments Morning Noon Evening Bedtime buPROPion  mg XL tablet Commonly known as:  Clark Valdez What changed:  medication strength Your last dose was: Your next dose is:    
   
   
 Dose:  300 mg Take 1 Tab by mouth every morning. Quantity:  60 Tab Refills:  0  
     
   
   
   
  
 dexamethasone 4 mg tablet Commonly known as:  DECADRON What changed:  additional instructions Your last dose was: Your next dose is: One pill twice a day Quantity:  8 Tab Refills:  0 CONTINUE these medications which have NOT CHANGED Dose & Instructions Dispensing Information Comments Morning Noon Evening Bedtime  
 acetaminophen-codeine 300-30 mg per tablet Commonly known as:  TYLENOL #3 Your last dose was: Your next dose is:    
   
   
 Dose:  1 Tab Take 1 Tab by mouth every four (4) hours as needed for Pain. Max Daily Amount: 6 Tabs. Quantity:  60 Tab Refills:  0  
     
   
   
   
  
 azaTHIOprine 50 mg tablet Commonly known as:  The Pepsi Your last dose was: Your next dose is:    
   
   
 Dose:  50 mg Take 1 Tab by mouth daily. Quantity:  60 Tab Refills:  0 ASK your doctor about these medications Dose & Instructions Dispensing Information Comments Morning Noon Evening Bedtime  
 bacitracin 500 unit/gram ointment Your last dose was: Your next dose is:    
   
   
 Dose:  1 Packet Apply 1 Packet to affected area as needed (Catheter Removal Dressing). Quantity:  1 Packet Refills:  0  
     
   
   
   
  
 cefTRIAXone 2 gram 2 g IVPB Your last dose was: Your next dose is:    
   
   
 Dose:  2 g  
2 g by IntraVENous route every twelve (12) hours every twelve (12) hours. Quantity:  16 Dose Refills:  0 Where to Get Your Medications Information on where to get these meds will be given to you by the nurse or doctor. ! Ask your nurse or doctor about these medications  
  acetaminophen-codeine 300-30 mg per tablet  
 azaTHIOprine 50 mg tablet buPROPion  mg XL tablet  
 dexamethasone 4 mg tablet  
 predniSONE 10 mg tablet  
 tamsulosin 0.4 mg capsule Discharge Instructions None Discharge Orders None [employer] Announcement We are excited to announce that we are making your provider's discharge notes available to you in [employer]. You will see these notes when they are completed and signed by the physician that discharged you from your recent hospital stay. If you have any questions or concerns about any information you see in [employer], please call the Health Information Department where you were seen or reach out to your Primary Care Provider for more information about your plan of care. Introducing [facility] & HEALTH SERVICES! Dear Misty Curry: Thank you for requesting a [employer] account. Our records indicate that you already have an active [employer] account. You can access your account anytime at [email or web address]. Ning by Glam Media/[email or web address] Apps Did you know that you can access your hospital and ER discharge instructions at any time in Madison [employer]? You can also review all of your test results from your hospital stay or ER visit. Additional Information If you have questions, please visit the Frequently Asked Questions section of the [place] Superdomehart website at [email or web address]. To The Tops/[email or web address] Remember, MyChart is NOT to be used for urgent needs. For medical emergencies, dial 911. Now available from your iPhone and Android! General Information Please provide this summary of care documentation to your next provider. Patient Signature:  ____________________________________________________________ Date:  ____________________________________________________________  
  
[facility] Provider Signature:  ____________________________________________________________ Date:  ____________________________________________________________

## 2017-03-09 NOTE — ROUTINE PROCESS
Bedside and Verbal shift change report given to Yoselin Ruelas RN (oncoming nurse) by Hendrick Hashimoto, RN (offgoing nurse). Report included the following information SBAR, Kardex and MAR. Patient had no acute events overnight. Currently resting in NAD. No express needs reported at this time.

## 2017-03-09 NOTE — DISCHARGE INSTRUCTIONS
DISCHARGE SUMMARY from Nurse    The following personal items are in your possession at time of discharge:    Dental Appliances: None        Home Medications: None  Jewelry: Bracelet, Other (comment) (rubber bracelet )  Clothing: None  Other Valuables: Other (comment) (eye patch)             PATIENT INSTRUCTIONS:    After general anesthesia or intravenous sedation, for 24 hours or while taking prescription Narcotics:  · Limit your activities  · Do not drive and operate hazardous machinery  · Do not make important personal or business decisions  · Do  not drink alcoholic beverages  · If you have not urinated within 8 hours after discharge, please contact your surgeon on call. Report the following to your surgeon:  · Excessive pain, swelling, redness or odor of or around the surgical area  · Temperature over 100.5  · Nausea and vomiting lasting longer than 4 hours or if unable to take medications  · Any signs of decreased circulation or nerve impairment to extremity: change in color, persistent  numbness, tingling, coldness or increase pain  · Any questions        What to do at [facility]:  * Recommended activity: Activity as tolerated     * If you experience any of the following symptoms as listed in your teaching for Meningitis under \"When Should You call for help?, please follow up with you Doctor/ and nurse. *  Please give a list of your current medications to your Primary Care Provider. *  Please update this list whenever your medications are discontinued, doses are      changed, or new medications (including over-the-counter products) are added. *  Please carry medication information at all times in case of emergency situations. Bacterial Meningitis: Care Instructions  Your Care Instructions    Bacterial meningitis is an infection of the tissues that surround the brain and spinal cord. This serious infection can injure the brain. It can be life-threatening.   How long it takes you to get better depends on how bad the illness is. It can take from just a couple of weeks to many months. You may have changes in how you think or concentrate. Most people with these symptoms get better over time. Be patient. Follow your doctor's instructions. Follow-up care is a key part of your treatment and safety. Be sure to make and go to all appointments, and call your doctor if you are having problems. It's also a good idea to know your test results and keep a list of the medicines you take. How can you care for yourself at home? · If your doctor prescribed antibiotics, take them as directed. Do not stop taking them just because you feel better. You need to take the full course of antibiotics. · Take an over-the-counter pain medicine, such as acetaminophen (Tylenol), ibuprofen (Advil, Motrin), or naproxen (Aleve) for pain or fever. Be safe with medicines. Read and follow all instructions on the label. · Do not take two or more pain medicines at the same time unless the doctor told you to. Many pain medicines have acetaminophen, which is Tylenol. Too much acetaminophen (Tylenol) can be harmful. · Get plenty of rest.  · Talk with your doctor about any new symptoms that develop, such as changes in your hearing or trouble concentrating. · Be sure that anyone who has come into close contact with you during this illness calls a doctor if he or she feels sick. When should you call for help? Call 911 anytime you think you may need emergency care. For example, call if:  · You have a seizure. Call your doctor now or seek immediate medical care if:  · Your symptoms come back or get worse. These may include:  ¨ A fever. ¨ A severe headache. ¨ A stiff neck. ¨ Nausea and vomiting. · You have trouble thinking or concentrating. Watch closely for changes in your health, and be sure to contact your doctor if:  · You do not get better as expected. Where can you learn more? Go to http://robin-gray.info/.   Enter M535 in the search box to learn more about \"Bacterial Meningitis: Care Instructions. \"  Current as of: May 24, 2016  Content Version: 11.1  © 2006-2016 Fooooo. Care instructions adapted under license by itsDapper (which disclaims liability or warranty for this information). If you have questions about a medical condition or this instruction, always ask your healthcare professional. [address] any warranty or liability for your use of this information. These are general instructions for a healthy lifestyle:    No smoking/ No tobacco products/ Avoid exposure to second hand smoke    Surgeon General's Warning:  Quitting smoking now greatly reduces serious risk to your health. Obesity, smoking, and sedentary lifestyle greatly increases your risk for illness    A healthy diet, regular physical exercise & weight monitoring are important for maintaining a healthy lifestyle    You may be retaining fluid if you have a history of heart failure or if you experience any of the following symptoms:  Weight gain of 3 pounds or more overnight or 5 pounds in a week, increased swelling in our hands or feet or shortness of breath while lying flat in bed. Please call your doctor as soon as you notice any of these symptoms; do not wait until your next office visit. Recognize signs and symptoms of STROKE:    F-face looks uneven    A-arms unable to move or move unevenly    S-speech slurred or non-existent    T-time-call 911 as soon as signs and symptoms begin-DO NOT go       Back to bed or wait to see if you get better-TIME IS BRAIN. Warning Signs of HEART ATTACK     Call 911 if you have these symptoms:   Chest discomfort. Most heart attacks involve discomfort in the center of the chest that lasts more than a few minutes, or that goes away and comes back. It can feel like uncomfortable pressure, squeezing, fullness, or pain.  Discomfort in other areas of the upper body. Symptoms can include pain or discomfort in one or both arms, the back, neck, jaw, or stomach.  Shortness of breath with or without chest discomfort.  Other signs may include breaking out in a cold sweat, nausea, or lightheadedness. Don't wait more than five minutes to call 911 - MINUTES MATTER! Fast action can save your life. Calling 911 is almost always the fastest way to get lifesaving treatment. Emergency Medical Services staff can begin treatment when they arrive -- up to an hour sooner than if someone gets to the hospital by car. RealDeckhart Activation    Thank you for requesting access to Cerecor. Please follow the instructions below to securely access and download your online medical record. Cerecor allows you to send messages to your doctor, view your test results, renew your prescriptions, schedule appointments, and more. How Do I Sign Up? 1. In your internet browser, go to https://"[employer] EverSpin Technologies/Medsurant Monitoringhart. 2. Click on the First Time User? Click Here link in the Sign In box. You will see the New Member Sign Up page. 3. Enter your Cerecor Access Code exactly as it appears below. You will not need to use this code after youve completed the sign-up process. If you do not sign up before the expiration date, you must request a new code. Cerecor Access Code: Activation code not generated  Current Cerecor Status: Active (This is the date your Cerecor access code will )    4. Enter the last four digits of your Social Security Number (xxxx) and Date of Birth (mm/dd/yyyy) as indicated and click Submit. You will be taken to the next sign-up page. 5. Create a KidsCasht ID. This will be your Cerecor login ID and cannot be changed, so think of one that is secure and easy to remember. 6. Create a Cerecor password. You can change your password at any time. 7. Enter your Password Reset Question and Answer. This can be used at a later time if you forget your password.    8. Enter your e-mail address. You will receive e-mail notification when new information is available in [address] 9. Click Sign Up. You can now view and download portions of your medical record. 10. Click the Download Summary menu link to download a portable copy of your medical information. Additional Information    If you have questions, please visit the Frequently Asked Questions section of the AirSage website at [email or web address] [email or web address] Remember, InOpenhart is NOT to be used for urgent needs. For medical emergencies, dial 911. Patient discharged without removing armband and transfered to another healthcare acute, sub acute , or extended care facility. Informed of privacy risks if armband lost or stolen     The discharge information has been reviewed with the patient. The patient verbalized understanding. Discharge medications reviewed with the patient and appropriate educational materials and side effects teaching were provided.

## 2017-03-09 NOTE — ROUTINE PROCESS
Hospital to SNF SBAR Handoff - [facility].                                                                        39 y.o.   male    [facility]   Room: 2114/01    [facility] 2W NEURO MED  Unit Phone# :  [phone]      [facility] 2W NEURO MED  [address] [place] [address]  Dept: [phone]  Loc: [phone]                    SITUATION     Admitted:  3/2/2017         Attending Provider:  Talita Johnson MD       Consultations:  IP CONSULT TO TELE-NEUROLOGY  IP CONSULT TO NEUROLOGY  IP CONSULT TO INFECTIOUS DISEASES  IP CONSULT TO NEUROLOGY    PCP:  Ruddy Nichole MD   [phone]    Treatment Team: Attending Provider: Talita Johnson MD; Consulting Provider: Stacie Sinclair MD; Consulting Provider: Saige Moreno MD; Consulting Provider: Esthela Moser MD; Utilization Review: Carson Parrish RN; Charge Nurse: Edyta Sykes RN; Occupational Therapy Assistant: ANKIT Bridges; Physical Therapy Assistant: Lit Rosas PTA    Admitting Dx:   Meningitis  Meningitis       Principal Problem: Meningitis    * No surgery found * of      BY: * Surgery not found *             ON: * No surgery found *                  Code Status: Full Code                Advance Directives:   Advance Care Planning 3/3/2017   Patient's Healthcare Decision Maker is: Legal Next of Kin   Confirm Advance Directive None   Patient Would Like to Complete Advance Directive No   Does the patient have other document types Other (comment)    (Send w/patient)   Not Received       Isolation:  There are currently no Active Isolations       MDRO: No current active infections    Pain Medications given:  3/7/17 Tylenol   Last dose: 650 mg PO at  16:32     Special Equipment needed: no  Type of equipment:    hemodialysis - Not currently on dialysis        BACKGROUND     Allergies:  No Known Allergies    Past Medical History:   Diagnosis Date    Arm pain     CVA (cerebrovascular accident) [facility]) 2012    Meningitis     Meningitis     Neurosarcoidosis ([facility] [address] 2012       Past Surgical History:   Procedure Laterality Date    HX HIP REPLACEMENT      HX WISDOM TEETH EXTRACTION         Prescriptions Prior to Admission   Medication Sig    [] cefTRIAXone (ROCEPHIN) 1 gram injection 1 g by IntraMUSCular route once for 1 dose.  predniSONE (DELTASONE) 20 mg tablet Take 3 Tabs by mouth daily (with breakfast) for 7 days.  [] ketorolac tromethamine (TORADOL) 60 mg/2 mL soln 2 mL by IntraMUSCular route once for 1 dose.  acetaminophen-codeine (TYLENOL #3) 300-30 mg per tablet Take 1 Tab by mouth every four (4) hours as needed for Pain. Max Daily Amount: 6 Tabs.  azaTHIOprine (IMURAN) 50 mg tablet Take 50 mg by mouth three (3) times daily.  buPROPion XL (WELLBUTRIN XL) 150 mg tablet Take 300 mg by mouth every morning. Hard scripts included in transfer packet no    Vaccinations:    Immunization History   Administered Date(s) Administered    Influenza Vaccine 10/12/2016    Pneumococcal Polysaccharide (PPSV-23) 2016       Readmission Risks:    Known Risks: unknown        The Charlson CoMorbitiy Index tool is an evidenced based tool that has more automatic generated information. The tool looks at many different items such as the age of the patient, how many times they were admitted in the last calendar year, current length of stay in the hospital and their diagnosis. All of these items are pulled automatically from information documented in the chart from various places and will generate a score that predicts whether a patient is at low (less than 13), medium (13-20) or high (21 or greater) risk of being readmitted.         ASSESSMENT                Temp: 98 °F (36.7 °C) (17) Pulse (Heart Rate): 82 (17)     Resp Rate: 14 (17)           BP: 129/79 (17)     O2 Sat (%): 97 % (17)     Weight: 79 kg (174 lb 2.6 oz) Height: 6' 2\" (188 cm) (03/03/17 0745)       If above not within 1 hour of discharge:    15:35- BP: 126/82  P: 78  R: 14 T: 98.0 O2 Sat: 97%  O2: RA    Active Orders   Diet    DIET REGULAR         Orientation: oriented to time, place, person and situation     Active Behaviors: None                                   Active Lines/Drains:  (Peg Tube / Gandhi / CL or S/L?): no    Urinary Status: Voiding     Last BM: Last Bowel Movement Date: 03/08/17                   Mobility: Slightly limited   Weight Bearing Status: WBAT (Weight Bearing as Tolerated)      Gait Training  Assistive Device: Gait belt, Walker, rolling  Ambulation - Level of Assistance: Moderate assistance  Distance (ft): 25 Feet (ft)  Interventions: Safety awareness training, Verbal cues         Lab Results   Component Value Date/Time    Glucose 91 03/08/2017 04:48 AM    Hemoglobin A1c 5.0 03/02/2017 04:04 PM    HGB 11.1 03/08/2017 04:48 AM    HGB 10.9 03/07/2017 07:00 AM        RECOMMENDATION     See After Visit Summary (AVS) for:  · Discharge instructions  · After [address]   · Special equipment needed (entered pre-discharge by Care Management)  · Medication Reconciliation    · Follow up Appointment(s)         Report given/sent by:  Jose Adames RN                    Verbal report given to:  Sadia Ledbetter RN FAXED to:  [phone]         Estimated discharge time:  3/9/2017 at [facility] will call with time

## 2017-03-10 NOTE — PROGRESS NOTES
Nutrition initial assessment-[facility]/  Plan of care      RECOMMENDATIONS:     1. Regular diet  2. Monitor weight, labs and PO intake  3. RD to follow     GOALS:     1. PO intake meets >75% of protein/calorie needs by 3/17  2. Weight Maintenance (+/- 1-2 lb by 3/17)    ASSESSMENT:     Weight status is classified as normal per BMI of 24.3. PO intake is adequate. Labs noted. Nutrition recommendations listed. RD to follow. Nutrition Diagnoses:   No nutrition diagnosis at this time. Nutrition Risk:  [] High  [] Moderate [x]  Low    SUBJECTIVE/OBJECTIVE:     Transferred from  Neuro to [facility] on 3/9/17. Patient reports good appetite and eating all of breakfast this morning. Denies food allergies and problems with chewing/swallowing. Reviewed SLP note- cleared for regular texture and thin liquids. States weight was stable PTA at 176 lb. Encouraged adequate intake. Will monitor.      Information Obtained from:    [x] Chart Review   [x] Patient   [] Family/Caregiver   [] Nurse/Physician   [] Interdisciplinary Meeting/Rounds    Diet: Regular diet  Medications: [x] Reviewed    Allergies: [x] Reviewed   Patient Active Problem List   Diagnosis Code    Neurosarcoidosis ([facility] D86.89    Left hemiparesis ([facility] G81.94    Advance care planning Z71.89    Meningitis G03.9    Headache R51     Past Medical History:   Diagnosis Date    Arm pain     CVA (cerebrovascular accident) ([facility] 2012    Meningitis     Meningitis     Neurosarcoidosis ([facility] 2012      Labs:    Lab Results   Component Value Date/Time    Sodium 141 03/08/2017 04:48 AM    Potassium 3.5 03/08/2017 04:48 AM    Chloride 106 03/08/2017 04:48 AM    CO2 26 03/08/2017 04:48 AM    Anion gap 9 03/08/2017 04:48 AM    Glucose 91 03/08/2017 04:48 AM    BUN 9 03/08/2017 04:48 AM    Creatinine 0.65 03/08/2017 04:48 AM    Calcium 7.9 03/08/2017 04:48 AM    Albumin 2.4 03/08/2017 04:48 AM     Anthropometrics: BMI (calculated): 24.3  Last 3 Recorded Weights in this Encounter 03/09/17 1943   Weight: 86 kg (189 lb 8 oz)      Ht Readings from Last 1 Encounters:   03/09/17 6' 2\" (1.88 m)     No data found. IBW: 190 lb %IBW: 100% UBW: 176 lb %UBW: 108%   [] Weight Loss [x] Weight Gain [] Weight Stable    Estimated Nutrition Needs: [x] MSJ  [] Other:  Calories: 2510 Kcal Based on:   [x] Actual BW    Protein:    g Based on:   [x] Actual BW    Fluid:       8488-4633 ml Based on:   [x] Actual BW      [x] No Cultural, Mormonism or ethnic dietary need identified.     [] Cultural, Mormonism and ethnic food preferences identified and addressed     Wt Status:  [x] Normal (18.6 - 24.9) [] Underweight (<18.5) [] Overweight (25 - 29.9) [] Mild Obesity (30 - 34.9)  [] Moderate Obesity (35 - 39.9) [] Morbid Obesity (40+)     Nutrition Problems Identified:   [] Suboptimal PO intake   [] Food Allergies  [] Difficulty chewing/swallowing/poor dentition  [] Constipation/Diarrhea   [] Nausea/Vomiting   [x] None  [] Other:     Plan:   [] Therapeutic Diet  []  Obtained/adjusted food preferences/tolerances and/or snacks options   []  Supplements added   [] Occupational therapy following for feeding techniques  [x]  HS snack added   []  Modify diet texture   []  Modify diet for food allergies   []  Assist with menu selection   [x]  Monitor PO intake on meal rounds   [x]  Continue inpatient monitoring and intervention   [x]  Participate in discharge planning/Interdisciplinary rounds/Team meetings   []  Other:     Education Needs:   [] Not appropriate for teaching at this time due to:   [x] Identified and addressed    Nutrition Monitoring and Evaluation:  [x] Continue ongoing monitoring and intervention  [] Other    Mary Muñoz

## 2017-03-10 NOTE — PROGRESS NOTES
Problem: Self Care Deficits Care Plan (Adult)  Goal: *Acute Goals and Plan of Care (Insert Text)  OCCUPATIONAL THERAPY SHORT TERM GOALS   Initiated 3/10/2017 and to be accomplished within 2 Week(s)    1. Patient will perform Upper body ADLs with/without adaptive equipment with supervision/set-up. 2. Patient will perform Lower body ADLs with/without adaptive equipment with minimal assistance/contact guard assist .  3. Patient will perform toileting task with minimal assistance/contact guard assist with Fair safety to reduce falls risk. 4. Patient will perform functional transfers with Alysia Aura and minimal assistance/contact guard assist.  5. Patient will perform standing static/dynamic balance activities for improved ADL/IADL function with minimal assistance/contact guard assist and Fair balance and safety awarenes. 6. Patient will improve Barthel index scores to atleast 60/100 to improve functional mobility. OCCUPATIONAL THERAPY LONG TERM GOALS   Initiated 3/10/2017 and to be accomplished within 3-4 Week(s)    1. Patient will perform Upper body ADLs with/without adaptive equipment with modified independence. 2. Patient will perform Lower body ADLs with/without adaptive equipment with modified independence. 3. Patient will perform toileting task with modified independence with Fair safety to reduce falls risk. 4. Patient will perform functional transfers with Rolling Walker and supervision/set-up and Fair balance and safety awareness. 5. Patient will perform standing static/dynamic activity for improved ADL/IADL function with modified independence an and Fair balance and safety awareness. 6. Patient will improve Barthel index score to 80/100 to improve independence with mobility. Therapist: Maris Miller 79. 3/10/2017   [facility]   OCCUPATIONAL THERAPY EVALUATION        Patient: Shamar Chu  (40 y.o. male)            Start of Care Date: 3/10/2017 Onset Date:  3/3/2017  Referred by : Debbra Litten, MD                        Primary Diagnosis: neurosarcoidosis              Precautions: Fall  Eval Complexity: History: MEDIUM Complexity : Expanded review of history including physical, cognitive and psychosocial  history . History of present problem reveals HIGH Complexity :3+ comorbidities / personal factors will impact the outcome/ POC . Past Medical history includes:   Past Medical History:   Diagnosis Date    Arm pain      [facility]A (cerebrovascular accident) ([facility] 2012    Meningitis      Meningitis      Neurosarcoidosis ([facility] 2012     Past Surgical History:   Procedure Laterality Date    HX HIP REPLACEMENT   2010    HX WISDOM TEETH EXTRACTION          Cognitive Status:  Mental Status  Neurologic State: Alert  Orientation Level: Oriented X4  Cognition: Appropriate for age attention/concentration; Follows commands  Perception: Cues to maintain midline in sitting;Cues to maintain midline in standing; Tactile;Verbal  Perseveration: No perseveration noted  Safety/Judgement: Fall prevention  Barriers to Learning/Limitations: yes;  sensory deficits-vision/hearing/speech and physical  Compensate with: visual, verbal, tactile, kinesthetic cues/model  Justification for Evaluation complexity:   MEDIUM Complexity : Patient may present with comorbidities that affect occupational performnce. Miniml to moderate modification of tasks or assistance (eg, physical or verbal ) with assesment(s) is necessary to enable patient to complete evaluation . Patient is referred to [facility] [address] [address] due to a functional decline in strength, endurance and balance for carryover of ADLs and safe transfers.    Prior Level of Function/Home Situation:   Home Situation  Home Environment: Private residence  # Steps to Enter: 2  Rails to Enter: No  One/Two Story Residence: Two story  # of Interior Steps: 16  Interior Rails: Both  Lift Chair Available: No  Living Alone: No  Support Systems: Parent, Family member(s)  Patient Expects to be Discharged to[de-identified] Private residence  Current DME Used/Available at Home: [address], [place], straight, Commode, bedside, Shower chair, Walker, rollator, Walker, rolling  Tub or Shower Type: Tub/Shower combination  Level of Assistance received at Home: Prior to this current hospitalization, the patient required no assistance for BADLs. Pt lives with his parents in a two ed house and reports getting occasional assistance with climbing up/down the steps to his bedroom on second floor. Otherwise was MOD I with BADLs. OBJECTIVE DATA SUMMARY:      Vital Signs:  Resting BP:  BP: 120/63  HR: Pulse (Heart Rate): 80     Pain:  Auditory:  Auditory  Auditory Impairment: None  Examination:   MEDIUM Complexity : 3-5 performance deficits relating to physical, cognitive , or psychosocial skils that result in activity limitations and / or participation restrictions; Tone and Sensation:  Tone:  (chronic flexor tone at L hand from previous CVA)  Sensation: Impaired  Visual Perceptual:  Vision  Diplopia: Yes  Acuity: Able to read employee name carlo without difficulty  Corrective Lenses:  (eye patch for L eye)    Coordination:  Coordination  Fine Motor Skills-Upper: Left Impaired;Right Intact  Gross Motor Skills-Upper: Left Impaired;Right Intact  Coordination: Generally decreased, functional  Fine Motor Skills-Upper: Left Impaired;Right Intact    Gross Motor Skills-Upper: Left Impaired;Right Intact  Bed Mobility:  Bed Mobility  Supine to Sit: Contact guard assistance  Sit to Supine: Minimum assistance  Transfers:  Functional Transfers  Sit to Stand: Minimum assistance; Additional time;Assist x1  Stand to Sit: Contact guard assistance; Additional time;Assist x1  Balance:  Balance  Sitting: Impaired  Sitting - Static: Fair (occasional) (+)  Sitting - Dynamic: Fair (occasional)  Standing: Pull to stand; With support  Standing - Static: Fair (-)  Standing - Dynamic : Poor (+)  Gross Assesment:  Gross Assessment  AROM: Generally decreased, functional  PROM: Generally decreased, functional  Strength: Generally decreased, functional  Coordination: Generally decreased, functional  Tone:  (chronic flexor tone at L hand from previous CVA)  Sensation: Impaired  ADL self care:  Basic ADL  Feeding: Setup  Oral Facial Hygiene/Grooming: Minimum assistance  Bathing: Minimum assistance  Upper Body Dressing: Minimum assistance  Lower Body Dressing: Moderate assistance  Toileting:  Moderate assistance                                            THE BARTHEL INDEX      ACTIVITY    SCORE   FEEDING  0=unable  5=needs help cutting,spreading butter,etc., or modified diet  10= independent    5   BATHING  0=dependent  5=independent (or in shower    0   GROOMING  0=needs help  5=independent face/hair/teeth/shaving (implements provided)    0   DRESSING  0=dependent  5=needs help but can do about half unaided  10=independent(including buttons, zips,laces etc.)    5   BOWELS  0=incontinent  5=occasional accident  10=continent    10   BLADDER  0=incontinent, or catheterized and unable to manage alone  5=occasional accident  10=continent    10   TOILET USE  0=dependent  5=needs some help, but can do something alone  10=independent (on and off, dressing, wiping)    5   TRANSFER (BED TO CHAIR AND BACK)  0=unable, no sitting balance  5=major help(one or two people,physical), can sit  10=minor help(verbal or physical)  15=independent    5   MOBILITY (ON LEVEL SURFACES)  0=immobile or <50 yards  5=wheelchair independent,including corners,>50 yards  10=walkes with help of one person (verbal or physical) >50 yards  15=independent(but may use any aid; for example, stick) >50 yards    0   STAIRS  0=unable  5=needs help (verbal, physical, carrying aid)  10=independent    0              TOTAL:             40/100          ASSESSMENT:   A clinical decision making of MEDIUM complexity was conducted, which includes an analysis of the Occupational profile, standardized assessments, data and treatment options. Additional comments:        TREATMENT PLAN : Rehab Potential : Good  Skilled Occupational Therapy Services may include:   [X] Self Care/Home Mgmt                    [ ]Cognitive Perceptual Re-training                              [X] Adaptive Equip Training                 [X]Therapeutic Exercise                  [ ]Sensory Integration                           [X]Community Work Integration  [X]Therapeutic Activities                     [ ]Other/modalities  [X]Neuromuscular Re-education         [ ] Wheelchair Mgmt/propulsion    [X]Patient/Family/Staff Instruction      :  [ ]Orthotics/Prosthetic Fitting & training      Frequency/Duration: Patient will be followed by Occupational therapy for 1-2 times a day for a minimum of 3 days per week for 4 weeks to address goals. .  Discharge Recommendations: Home Health and To Be Determined  Patient expected Discharge Location: [X]Private Residence  [ ] [facility]  [ [facility]  [ ] [address]       COMMUNICATION/EDUCATION:  Patient/Family Agreement with Treatment Plan :      [X]       OT Evaluation/POC has been reviewed with the PHAM. [X]        Fall prevention education was provided and the patient/caregiver indicated understanding  [X]        Patient/family have participated as able in goal setting and plan of care. Patient/family agree to work toward stated goals and plan of care. [ ]        Patient is unable to participate in goal setting and plan of care. Therapist will contact SW/POA. Treatment session:   Overall Complexity:MEDIUM Evaluation:  30mins. Treatment :   10 mins.      Occupational Therapist:   Maris Hedrick 79.         3/10/2017   Thank You for this referral.

## 2017-03-10 NOTE — ROUTINE PROCESS
Bedside and verbal shift report given to [address] [place] [address] LPN (oncoming nurse) by Thomas Perea LPN (off going nurse) Report included SBAR, Kardex Mars and recent results.

## 2017-03-10 NOTE — PROGRESS NOTES
Pt was lying in bed upon approach. Pt alert and oriented. Pt was very pleasant and cooperative during initial activity interview. Pt ID watching/ playing basketball, walking, and David games as leisure interests. Pt educated on leisure cabinet and unit activities. Maintain current leisure interests.

## 2017-03-10 NOTE — H&P
[address]    Name:  Lilliana Huber  MR#:  [id number]  :  1980  Account #:  [de-identified]  Date of Adm:  2017      HISTORY OF PRESENT ILLNESS: The patient is a 76-year-old  gentleman with a preceding history of    1. Neurosarcoidosis. 2. Previous CVA with left hemiparesis. 3. Questionable history of meningitis. He presented to the hospital with headache and new onset diplopia  involving his left eye. He had a temperature at home of 101. He was  admitted to the hospital where he underwent LP with CSF analysis with  white blood cells, low blood sugar. He was started on Rocephin,  vancomycin, and acyclovir. He had consultations by Infectious Disease  and Neurology. Acyclovir was stopped. CSF studies have been  negative for infection. Vancomycin was stopped. He was kept on  Rocephin somewhat precautionarily and it was felt that the  neurosarcoid was the most likely cause of his problem. He was started  on Imuran and steroids. He is not normally on steroids, and Decadron  to be able to switch to prednisone. MEDICATIONS: He was discharged on the medications that include  1. Ceftriaxone every 12 hours for 8 days. 2. Flomax 0.4 daily. 3. Decadron 4 mg every 8 hours for 1 week, then 4 mg every 12 hours  for 1 week, and then prednisone 40 mg a day. 4. Imuran 50 mg a day. 5. Tylenol No. 3, 1 every 4 hours p.r.n. pain. 6. Bupropion  mg every morning. SOCIAL HISTORY: Reveals that he lives with his mother and father. REVIEW OF SYSTEMS  CONSTITUTIONAL: He denies weight loss or weight gain. HEENT: He does admit to the diplopia as mentioned before, but states  his visual acuity seems to be fine. CARDIOVASCULAR: Denies chest pain or palpitations. GASTROINTESTINAL: Denies nausea or vomiting. GENITOURINARY: Denies difficulty urinating.   MUSCULOSKELETAL: He denies joint symptoms at the present time,  although he has decreased function on his left side from his  previous CVA. IMPRESSION  1. Neurosarcoid with acute exacerbation. 2. Possible bacterial meningitis. 3. Diplopia left eye secondary to #1. 4. Previous cerebrovascular accident with left hemiparesis.         Haroon Acosta MD    [facility] / Dorcas Wu  D:  03/10/2017   10:03  T:  03/10/2017   10:37  Job #:  [id number]

## 2017-03-11 NOTE — ROUTINE PROCESS
Bedside and verbal shift change report given to [address] [place] [address] LPN (ONcoming nurse) by Melanie Boles LPN (off going nurse) Report included SBAR, Kardex, Mars, and recent results.

## 2017-03-11 NOTE — PROGRESS NOTES
met with patient completed the initial Spiritual Assessment of the patient, and offered Pastoral Care, see flow sheets for interventions.  extended the assurance of prayer and spiritual care materials. Patient does not have any Baptist/cultural needs that will affect patients preferences in health care. The patient was informed that chaplains will continue to follow and will provide pastoral care on an as needed/requested basis.       Jennifer Weir, MPH, [address] [phone]

## 2017-03-11 NOTE — PROGRESS NOTES
Problem: Mobility Impaired (Adult and Pediatric)  Goal: *Therapy Goal (Edit Goal, Insert Text)  PHYSICAL THERAPY STG GOALS :  Initiated 3/11/2017 and to be accomplished within 1 Weeks    1. Patient will transfer from bed to chair and chair to bed with modified independence using FWW.  2. Patient will perform sit to stand with modified independence with Good balance and safety awareness. 3. Patient will ambulate with modified independence for 200 feet with FWW on level surfaces with 2 turns. 4. Patient will ascend/descend 2 stairs with no handrail(s) with supervision/set-up to allow for safe home access/exit. 5. Patient will improve standardized test score for [facility] Standing Balance Scale to 5 to improve stability during manipulation of the home environment. Physical Therapist: Fernie Carrion on 3/11/2017   [id number]   PHYSICAL THERAPY EVALUATION     Patient: Tana Barrett (40 y.o. male)                Start of Care Date: 3/11/2017   Referred by : Tiana Armstrong MD                                      Precautions: Ilya Wahl          History:  Patient was [facility]/3/2017 with a Dx of neurosarcoidosis    History of present problem reveals MEDIUM  Complexity : 1-2 comorbidities / personal factors will impact the outcome/ POC . Past Medical history includes:   Past Medical History:   Diagnosis Date    Arm pain      CVA (cerebrovascular accident) ([address] 2012    Meningitis      Meningitis      Neurosarcoidosis ([facility] [address] 2012     Past Surgical History:   Procedure Laterality Date    HX HIP REPLACEMENT   2010    HX WISDOM TEETH EXTRACTION            Justification for Evaluation complexity:  Patient is referred to Skilled Physical Therapy services due to a functional decline in mobility and L hemiparesis and required an overall MEDIUM complexity evaluation examination. Cognitive Status:  Mental Status  Neurologic State: Alert; Appropriate for age  Orientation Level: Oriented X4  Cognition: Appropriate for age attention/concentration  Barriers to Learning/Limitations: yes;  sensory deficits-vision/hearing/speech, mild dysarthria  Compensate with: visual, verbal, tactile, kinesthetic cues/model  Prior Level of Function/Home Situation and Assitance recieved:  Home Situation  Home Environment: Private residence  # Steps to Enter: 2  Rails to [employer]: No  One/Two Story Residence: Two story  # of Interior Steps: 12  Interior Rails: Both  Lift Chair Available: No  Living Alone: No  Support Systems: Family member(s)  Patient Expects to be Discharged to[de-identified] Private residence  Current DME Used/Available at Home: Cane, straight, Walker, rolling  Tub or Shower Type: Tub/Shower combination  Level of Assistance received at Home:   Prior to this current hospitalization, the patient required occasional assistance with stair negotiation from the first to 2nd floor of his home from his parents and lived with his parents in a 2 story home. He utilized a SPC vs FWW for mobility and reports he was independent with ADLs. OBJECTIVE DATA SUMMARY:      Exam:MEDIUM Complexity : 3 Standardized tests and measures addressing body structure, function, activity limitation and / or participation in recreation    Clinical Presentation: MEDIUM Complexity : Evolving with changing characteristics   Vital Signs:  Resting BP:     HR:      Pain:   Pt denies pain  Gross Assessment:  Gross Assessment  AROM: Generally decreased, functional  PROM: Generally decreased, functional  Strength: Generally decreased, functional  Coordination: Generally decreased, functional  Tone: Abnormal  Sensation: Impaired   Bed Mobility:  Bed Mobility  Supine to Sit: Contact guard assistance  Scooting: Contact guard assistance  Balance:  Balance  Sitting: Intact  Sitting - Static: Good (unsupported)  Sitting - Dynamic: Good (unsupported)  Standing: Impaired; With support  Standing - Static: Fair  Standing - Dynamic : Fair  Transfers:  Sit to Stand: Setup;Contact guard assistance  Gait:  Gait  Base of Support: Shift to left; Shift to right;Other (comment) (variable lean, leans to L, LOB then leans to R)  Speed/Kami: Pace decreased (<100 feet/min); Slow  Step Length: Left shortened;Right shortened  Swing Pattern: Left asymmetrical;Right asymmetrical  Stance: Left decreased;Right increased;Weight shift  Gait Abnormalities: Decreased step clearance; Hemiplegic; Path deviations;Trunk sway increased; Other (leans to L, LOB when loss of , then Lean to R)  Ambulation - Level of Assistance: Contact guard assistance  Distance (ft): 185 Feet (ft) (133 & 100 feet for f/u rounds of walking)  Assistive Device: Walker, rolling; Other (comment) (trained with quad cane for last 100 feet of ambulation today)  Interventions: Verbal cues; Visual/Demos     With 3 turns. Stair Negotiation: Pt demonstrates min assist progressing to CGA stair negotiation with 1 HR assist on R. Requires cueing for non-reciprocal step technique and hand placement. Requires min assist for balance when attempting to perform stair training without HR assist. 16steps trained with 1 HR assist, 4 with no HR. Assessment:   Clinical Decision Making: Of Medium Complexity evaluation using standardized patient assessment instrument and /or measurable assessement of functional outcome of level of assistance, [facility] balance scale, and strength assessment. Pt demonstrates grossly weak LLE, limited use of the LUE d/t flexor tone and weakness, requires min assistance to mod I for functional mobility, and demonstrates 4+ score on [employer] standing balance Scale. Positioning needs/Additional Comments :   Pt found seated upright in W/C with PHAM and nurse present in room. Nursing reports pt independently mobilizing in bed and had been found seated at EOB upon her arrival. 14 minutes of co-treatment provided with OT.  Pt demonstrates good sitting balance, but requires CGA and occasional cues for hand placement with sit to stand transfers with FWW, CGA with ambulation with FWW with several LOB during gait training d/t loss of  with LUE on walker with resultant LOB to the L. Pt demonstrates independent balance recovery and then compensates with R trunk lean during gait. Demonstrates ability to maintain balance without AD with reaching in multiple planes, however, requires 1 UE assist on FWW when turning in place d/t LOB to L. In addition, performed gait training with cues for safe technique utilizing FWW as well as quad cane as well as stair negotiation training as above. Pt may require modification of FWW or other assist to maintain LUE hand placement on walker, though he appears to demonstrate improved ease of gait during brief training with quad cane. TREATMENT PLAN: Rehab Potential : Good  Skilled Physical Therapy Services may include:   [X]           Bed Mobility Training             [X]    Neuromuscular Re-Education  [X]           Transfer Training                   [X]    Orthotic/Prosthetic Training  [X]           Gait Training                          [X]    Modalities  [ ]           Therapeutic Procedures        [X]    Manual Therapy  [X]           Therapeutic Activities            [X]    Patient and Family Training/Education  [ ]           Other (comment):      Frequency/Duration: Patient will be followed by physical therapy for 1-2 times a day for a minimum of 3 days per week for 4 weeks to address goals. Discharge Recommendations: Home Health, Outpatient and To Be Determined  Patient's expected Discharge Location:  [X]Private Residence  [ ] [facility]  [ [facility]  [ ]Other:       COMMUNICATION/EDUCATION:  Patient/Family Agreement with Treatment Plan :      [X]         The PT evaluation/POC has been reviewed with PT assistant. [X]         Fall prevention education was provided and the patient/caregiver indicated understanding.   [X]         Patient/family have participated as able in goal setting and plan of care and Patient/family agree to work toward stated goals and plan of care. [ ]         Patient is unable to participate in goal setting and plan of care. Therapist/SW will contact family/POA. Treatment session:  Overall Complexity: MEDIUM Evaluation:  10 mins./ Treatment:  25 mins.   Physical Therapist:   Alex Arita, PT, DPT, ATC, CSCS       3/11/2017   Thank you for this referral.

## 2017-03-11 NOTE — PROGRESS NOTES
Problem: Self Care Deficits Care Plan (Adult)  Goal: *Acute Goals and Plan of Care (Insert Text)  OCCUPATIONAL THERAPY SHORT TERM GOALS   Initiated 3/10/2017 and to be accomplished within 2 Week(s)    1. Patient will perform Upper body ADLs with/without adaptive equipment with supervision/set-up. 2. Patient will perform Lower body ADLs with/without adaptive equipment with minimal assistance/contact guard assist .  3. Patient will perform toileting task with minimal assistance/contact guard assist with Fair safety to reduce falls risk. 4. Patient will perform functional transfers with Catana Graves and minimal assistance/contact guard assist.  5. Patient will perform standing static/dynamic balance activities for improved ADL/IADL function with minimal assistance/contact guard assist and Fair balance and safety awarenes. 6. Patient will improve Barthel index scores to atleast 60/100 to improve functional mobility. OCCUPATIONAL THERAPY LONG TERM GOALS   Initiated 3/10/2017 and to be accomplished within 3-4 Week(s)    1. Patient will perform Upper body ADLs with/without adaptive equipment with modified independence. 2. Patient will perform Lower body ADLs with/without adaptive equipment with modified independence. 3. Patient will perform toileting task with modified independence with Fair safety to reduce falls risk. 4. Patient will perform functional transfers with Rolling Walker and supervision/set-up and Fair balance and safety awareness. 5. Patient will perform standing static/dynamic activity for improved ADL/IADL function with modified independence an and Fair balance and safety awareness. 6. Patient will improve Barthel index score to 80/100 to improve independence with mobility. Therapist: Willa Valdez, OTR 3/10/2017   [facility]   OCCUPATIONAL THERAPY DAILY TREATMENT NOTE        Patient: Merlin Moeller.  (40 y.o. male)                            Date: 3/11/2017  Attending Physician: Jorge Ayala MD  Primary Diagnosis: neurosarcoidosis        Precautions : Precautions at Admission: Fall  Vital Signs:        Cognitive Status:  Mental Status  Neurologic State: Alert; Appropriate for age  Orientation Level: Oriented X4  Cognition: Appropriate for age attention/concentration  Bed Mobility:  Bed Mobility  Supine to Sit: Contact guard assistance  Scooting: Contact guard assistance  Transfers:  Functional Transfers  Sit to Stand: Contact guard assistance; Additional time  Stand to Sit: Contact guard assistance     Balance:  Balance  Sitting: Impaired  Sitting - Static: Fair (occasional) (+)  Sitting - Dynamic: Fair (occasional)  Standing: With support  Standing - Static: Fair  Standing - Dynamic : Fair  ADL Self Care:  Basic ADL  Oral Facial Hygiene/Grooming: Setup  Upper Body Dressing: Stand-by assistance (pt able to gagan L hand brace MOD I. ) Pt educated on nunu tech with L UE. Lower Body Dressing: Minimum assistance     Therapeutic Activities:  Pt demo UB/LB dressing tasks, see above for levels of A. CGA fnxl room and hallway mobility with RW ~ 133 ft and 185 ft with few LOB's posteriorly and laterally to L with 3/4 self correction. CGA dyn standing task with 1/0 UE support reaching OH and across mid line to challenge balance and stability for increased I and safety with ADL tasks. Patient/Caregiver Education:    Pt educated on fall prevention and safety for optimal fnxl gains. ASSESSMENT:  Patient continues to demonstrate the need for skilled Occupational Therapy services to improve strength, balance, and endurance. Progression toward goals:  [X]      Improving appropriately and progressing toward goals  [ ]      Improving slowly and progressing toward goals  [ ]      Not making progress toward goals and plan of care will be adjusted   Partial PT co tx for optimal safety during fnxl mobility and balance activities.    Treatment session:   30 minutes     Therapist: ANKIT Trejo,  3/11/2017

## 2017-03-12 NOTE — PROGRESS NOTES
Problem: Mobility Impaired (Adult and Pediatric)  Goal: *Therapy Goal (Edit Goal, Insert Text)  PHYSICAL THERAPY STG GOALS :  Initiated 3/11/2017 and to be accomplished within 1 Weeks    1. Patient will transfer from bed to chair and chair to bed with modified independence using FWW.  2. Patient will perform sit to stand with modified independence with Good balance and safety awareness. 3. Patient will ambulate with modified independence for 200 feet with FWW on level surfaces with 2 turns. 4. Patient will ascend/descend 2 stairs with no handrail(s) with supervision/set-up to allow for safe home access/exit. 5. Patient will improve standardized test score for [facility] Standing Balance Scale to 5 to improve stability during manipulation of the home environment. Physical Therapist: Maxine Guzman on 3/11/2017        [facility]   PHYSICAL THERAPY DAILY TREATMENT NOTE        Patient: Josseline Valentino (40 y.o. male)               Date: 3/12/2017    Physician: Clark Torres MD  Primary Diagnosis: neurosarcoidosis          Treatment Diagnosis  Treatment Diagnosis: neurosarcoidosis   Precautions: Fall, WBAT  Vital Signs  Vital Signs  Temp: 97.9 °F (36.6 °C)  Temp Source: Oral  Pulse (Heart Rate): 98  Resp Rate: 16  O2 Sat (%): 99 %  BP: 132/68  MAP (Calculated): 89  Cognitive Status:  Mental Status  Neurologic State: Alert; Appropriate for age  Orientation Level: Oriented X4  Cognition: Follows commands  Pain  Bed Mobility Training  Balance  Sitting: Intact  Sitting - Static: Good (unsupported)  Sitting - Dynamic: Good (unsupported)  Standing: Impaired; With support  Standing - Static: Fair  Standing - Dynamic : Fair  Transfer Training  Transfer Training  Sit to Stand: Contact guard assistance  Stand to Sit: Contact guard assistance  Sit to Stand: Contact guard assistance  Gait Training  Gait  Base of Support: Center of gravity altered  Speed/Kami: Pace decreased (<100 feet/min)  Step Length: Left shortened;Right shortened  Gait Abnormalities: Decreased step clearance; Path deviations;Trunk sway increased  Ambulation - Level of Assistance: Contact guard assistance  Distance (ft): 225 Feet (ft) (x2)  Assistive Device: Gait belt;Walker, rolling  Interventions: Manual cues; Safety awareness training; Tactile cues; Verbal cues; Visual/Demos  Gait Abnormalities: Decreased step clearance; Path deviations;Trunk sway increased  Therapeutic Exercise:   Pt tolerated treatment session well and continues to demonstrate improvement in overall functional mobility~ambulating 225ft x2 reps CGA using FWW with improved ability to  walker with L hand throughout gait. Pt ambulated with slow serge, mild path deviation, and increased lateral trunk sway but no LOB. Pt performed seated ther-ex: AROM BLE's: LAQ's and hip flexion marches to increase LE strength/endurance. Patient/Caregiver Education:   Pt /Caregiver Education on safety sequencing techniques and proper AD management during surface<->surface t/f's due to pt's tendency to leave AD behind. Pt needs reinforcement. ASSESSMENT:  Patient continues to benefit from Skilled PT services to improve safety awareness, increase strength, endurance, and overall functional mobility. Progression toward goals:  [X]      Improving appropriately and progressing toward goals  [ ]      Improving slowly and progressing toward goals  [ ]      Not making progress toward goals and plan of care will be adjusted      Treatment session: 30 minutes.   Therapist:   Corbin Plunkett PTA,          3/12/2017

## 2017-03-12 NOTE — ROUTINE PROCESS
Bedside and Verbal shift change report given to MARITZA VENTURA RN (oncoming nurse) by MILSE PARK LPN (offgoing nurse). Report included the following information SBAR, Kardex and MAR.

## 2017-03-13 NOTE — PROGRESS NOTES
JOSEPH met with pt to complete the social evaluation. Pt lives with his mother and stepfather and states that he plans to return home at d/c. Pt used a rollater walker for mobility and a quad cane. Pt stated that he was independent with adl's. JOSEPH informed pt that his [employer] approved him for 7 days and that Joseph will send an update on 3/15/17 to request additional time if needed. JOSEPH left message for pt's mother  Giorgio Ellis informing her that SW will be the  for d/c needs and the  for pt's insurance. Joseph requested that pt's mother call JOSEPH back with a daytime phone number to contact her at.

## 2017-03-13 NOTE — PROGRESS NOTES
GIM     Patient: Billy Parisi. MRN: [id number]  CSN: [id number]    YOB: 1980  Age: 39 y.o. Sex: male    DOA: 3/9/2017 LOS:  LOS: 4 days                    Subjective:     Pt with neuro saqrcoid and ? bacerial meningitis. For several more days of rocephin. No head ache and feels better. Diplopia persists. Objective:      Visit Vitals    /66    Pulse 83    Temp 98 °F (36.7 °C)    Resp 18    Ht 6' 2\" (1.88 m)    Wt 86 kg (189 lb 8 oz)    SpO2 97%    BMI 24.33 kg/m2       Physical Exam:  Chest clear  Cor rr  abd soft  No edema    Intake and Output:  Current Shift:     Last three shifts:  03/11 1901 - 03/13 0700  In: -   Out: 950 [Urine:950]    No results found for this or any previous visit (from the past 24 hour(s)). Current Facility-Administered Medications   Medication Dose Route Frequency    [START ON 3/16/2017] dexamethasone (DECADRON) tablet 4 mg  4 mg Oral Q12H    [START ON 3/24/2017] predniSONE (DELTASONE) tablet 10 mg  10 mg Oral DAILY WITH BREAKFAST    cefTRIAXone (ROCEPHIN) 2 g in 0.9% sodium chloride (MBP/ADV) 50 mL MBP  2 g IntraVENous Q12H    dexamethasone (DECADRON) tablet 4 mg  4 mg Oral Q8H    azaTHIOprine (IMURAN) tablet 50 mg  50 mg Oral DAILY    acetaminophen-codeine (TYLENOL #3) per tablet 1 Tab  1 Tab Oral Q4H PRN    buPROPion XL (WELLBUTRIN XL) tablet 300 mg  300 mg Oral 7am    tamsulosin (FLOMAX) capsule 0.4 mg  0.4 mg Oral DAILY    magnesium hydroxide (MILK OF MAGNESIA) 400 mg/5 mL oral suspension 30 mL  30 mL Oral DAILY PRN    bisacodyl (DULCOLAX) suppository 10 mg  10 mg Rectal DAILY PRN    sodium phosphate (FLEET'S) enema 118 mL  1 Enema Rectal PRN       Lab Results   Component Value Date/Time    Glucose 91 03/08/2017 04:48 AM    Glucose 99 03/07/2017 07:00 AM    Glucose 98 03/06/2017 07:15 AM    Glucose 101 03/04/2017 04:20 AM    Glucose 90 03/02/2017 04:50 PM        Assessment/Plan     Active Problems:    * No active hospital problems. Fernando Cohen MD  3/13/2017, 11:39 AM

## 2017-03-13 NOTE — PROGRESS NOTES
I have reviewed this patient's current medication list and recent laboratory results. At this time, I do not suggest any drug therapy adjustments or additional laboratory monitoring. Thank you,  Ibrahima JIMENEZ  Ph. M. S.  3/13/2017

## 2017-03-13 NOTE — PROGRESS NOTES
Problem: Mobility Impaired (Adult and Pediatric)  Goal: *Acute Goals and Plan of Care (Insert Text)  [facility]   PHYSICAL THERAPY DAILY TREATMENT NOTE        Patient: Kristine Sol (40 y.o. male)               Date: 3/13/2017    Physician: Elif Yee MD  Primary Diagnosis: neurosarcoidosis          Treatment Diagnosis  Treatment Diagnosis: neurosarcoidosis   Precautions: Fall, WBAT  Vital Signs  Cognitive Status:  Pain  Bed Mobility Training  Bed Mobility Training  Supine to Sit: Supervision  Sit to Supine: Supervision  Scooting: Supervision  Balance  Sitting: Intact  Sitting - Static: Good (unsupported)  Sitting - Dynamic: Good (unsupported)  Standing: Impaired; With support  Standing - Static: Fair  Standing - Dynamic : Fair  Transfer Training  Transfer Training  Sit to Stand: Stand-by asssistance;Contact guard assistance  Stand to Sit: Stand-by asssistance;Contact guard assistance  Bed to Chair: Contact guard assistance  Interventions: Safety awareness training;Verbal cues  Sit to Stand: Stand-by asssistance;Contact guard assistance  Gait Training  Gait  Base of Support: Center of gravity altered  Speed/Kami: Pace decreased (<100 feet/min)  Step Length: Right shortened;Left shortened  Gait Abnormalities: Decreased step clearance; Path deviations;Trunk sway increased  Ambulation - Level of Assistance: Contact guard assistance  Distance (ft): 225 Feet (ft) (+55)  Assistive Device: Walker, rolling;Gait belt;Walker nunu  Interventions: Safety awareness training;Verbal cues; Tactile cues  Gait Abnormalities: Decreased step clearance; Path deviations;Trunk sway increased    With 3 turns. Wheelchair Mobility/Mgmt  Therapeutic Exercise: Co-treat with OT to maximize functional gains with standing balance and ambulatory activities. Bed mobility as noted above. Gait training attempted with use of nunu-walker. Pt presented with much trunk sway with noted difficulty with advancing nunu-walker.  Pt reported feeling unstable with use of nunu-walker and stated \"I feel better with the other walker\". Gait training continued with RW and abovementioned details with close w/c follow for safety. Seated B LE TE rendered with 2# AW to promote strength and endurance for improved functional mobility: HR/TR, LAQ, hip flexion 2x20 with occasional verbal cues for proper technique. Therapist advised pt to call for assistance with all OOB activities as pt reported standing at EOB to use urinal without assistance. Patient/Caregiver Education:   Pt /Caregiver Education on safety and fall prevention and calling for assistance with standing or getting OOB was provided to reduce risk of falls. ASSESSMENT:  Patient continues to benefit from Skilled PT services to improve transfers, strength, balance, gait and stair negotiation. Progression toward goals:  [X]      Improving appropriately and progressing toward goals  [ ]      Improving slowly and progressing toward goals  [ ]      Not making progress toward goals and plan of care will be adjusted      Treatment session: 55 minutes.   Therapist:   Linn Lindsey PTA,          3/13/2017

## 2017-03-13 NOTE — ROUTINE PROCESS
Bedside and verbal shift report given to MILES Bai LPN (oncoming nurse) by MAYURI Freeman (off going nurse). Report included SBAR, MAR and Kardex.  Hourly rounds completed

## 2017-03-13 NOTE — PROGRESS NOTES
Problem: Self Care Deficits Care Plan (Adult)  Goal: *Acute Goals and Plan of Care (Insert Text)  OCCUPATIONAL THERAPY SHORT TERM GOALS   Initiated 3/10/2017 and to be accomplished within 2 Week(s)    1. Patient will perform Upper body ADLs with/without adaptive equipment with supervision/set-up. 2. Patient will perform Lower body ADLs with/without adaptive equipment with minimal assistance/contact guard assist .  3. Patient will perform toileting task with minimal assistance/contact guard assist with Fair safety to reduce falls risk. 4. Patient will perform functional transfers with [place] and minimal assistance/contact guard assist.  5. Patient will perform standing static/dynamic balance activities for improved ADL/IADL function with minimal assistance/contact guard assist and Fair balance and safety awarenes. 6. Patient will improve Barthel index scores to atleast 60/100 to improve functional mobility. OCCUPATIONAL THERAPY LONG TERM GOALS   Initiated 3/10/2017 and to be accomplished within 3-4 Week(s)    1. Patient will perform Upper body ADLs with/without adaptive equipment with modified independence. 2. Patient will perform Lower body ADLs with/without adaptive equipment with modified independence. 3. Patient will perform toileting task with modified independence with Fair safety to reduce falls risk. 4. Patient will perform functional transfers with Rolling Walker and supervision/set-up and Fair balance and safety awareness. 5. Patient will perform standing static/dynamic activity for improved ADL/IADL function with modified independence an and Fair balance and safety awareness. 6. Patient will improve Barthel index score to 80/100 to improve independence with mobility. Therapist: Radha Murry, OTR 3/10/2017   [facility]   OCCUPATIONAL THERAPY DAILY TREATMENT NOTE        Patient: Niki Mooney.  (40 y.o. male)                            Date: 3/13/2017  Attending Physician: Devendra Begum MD  Primary Diagnosis: neurosarcoidosis    Treatment Diagnosis  Treatment Diagnosis: neurosarcoidosis    Precautions : Precautions at Admission: Fall, WBAT  Vital Signs:        Cognitive Status:     Pain:        Gross Assessment:     Coordination:     Bed Mobility:  Bed Mobility  Supine to Sit: Supervision  Sit to Supine: Supervision  Scooting: Supervision  Transfers:  Functional Transfers  Sit to Stand: Stand-by asssistance;Contact guard assistance  Stand to Sit: Stand-by asssistance;Contact guard assistance  Bed to Chair: Contact guard assistance     Balance:  Balance  Sitting: Intact  Sitting - Static: Good (unsupported)  Sitting - Dynamic: Good (unsupported)  Standing: Impaired; With support  Standing - Static: Fair  Standing - Dynamic : Fair  Therapeutic Activities:  Shadowed patient with bed mobility in order to assess safety and independence during routine. See above for levels of A needed. Functional mobility utilizing RW and christopher walker in order to assess safety and independence during routine. Patient demonstrated increased independence and safety with utilizing RW during mobility vs. Christopher RW. Cueing needed for safety such as standing utilizing RW and keep B UE on during mobility for optimal safety. Discussed with patient a L UE splint to assess with L digit contractures and optimal use needed for ADL tasks and transfers. Patient verbalized understanding. Therapeutic Exercises:  PROM to L UE with prolonged stretch in order to increase ROM needed for UB ADLs. Patient reports 7/10 pain during AROM today. Patient/Caregiver Education:    PtMary Stephens Education on see above.   ASSESSMENT:  Patient continues to demonstrate the need for skilled Occupational Therapy services to improve static standing balance needed for bathing  Progression toward goals:  [X]      Improving appropriately and progressing toward goals  [ ]      Improving slowly and progressing toward goals  [ ] Not making progress toward goals and plan of care will be adjusted      Treatment session:   50 minutes     Therapist:    ANKIT Morales,  3/13/2017

## 2017-03-14 NOTE — ROUTINE PROCESS
Bedside and Verbal shift change report given to kamari Felipe (oncoming nurse) by brandon Bai lpn (offgoing nurse). Report included the following information SBAR, Kardex and MAR.  Hourly rounds

## 2017-03-14 NOTE — ROUTINE PROCESS
Bedside and verbal shift report given to [address] [place] [address] LPN (oncoming nurse) by Rohan Pruett LPN (off going nurse, Report included SBAR, Kardex, [place] and recent labs.

## 2017-03-14 NOTE — PROGRESS NOTES
Problem: Mobility Impaired (Adult and Pediatric)  Goal: *Therapy Goal (Edit Goal, Insert Text)  PHYSICAL THERAPY STG GOALS :  Initiated 3/11/2017 and to be accomplished within 1 Weeks    1. Patient will transfer from bed to chair and chair to bed with modified independence using FWW.  2. Patient will perform sit to stand with modified independence with Good balance and safety awareness. 3. Patient will ambulate with modified independence for 200 feet with FWW on level surfaces with 2 turns. 4. Patient will ascend/descend 2 stairs with no handrail(s) with supervision/set-up to allow for safe home access/exit. 5. Patient will improve standardized test score for [facility] Standing Balance Scale to 5 to improve stability during manipulation of the home environment. Physical Therapist: Bree Groves on 3/11/2017        [facility]   PHYSICAL THERAPY DAILY TREATMENT NOTE        Patient: Russell Livingston (40 y.o. male)               Date: 3/14/2017    Physician: Raman Rutherford MD  Primary Diagnosis: neurosarcoidosis          Treatment Diagnosis  Treatment Diagnosis: neurosarcoidosis   Precautions: Fall, WBAT  Vital Signs  Cognitive Status:  Mental Status  Neurologic State: Alert; Appropriate for age  Orientation Level: Oriented X4  Pain  Bed Mobility Training  Bed Mobility Training  Supine to Sit: Supervision  Scooting: Supervision  Balance  Sitting: Without support  Sitting - Static: Good (unsupported)  Sitting - Dynamic: Fair (occasional)  Standing: With support  Standing - Static: Fair  Standing - Dynamic : Fair  Transfer Training  Transfer Training  Sit to Stand: Stand-by asssistance  Stand to Sit: Stand-by asssistance  Bed to Chair: Contact guard assistance  Interventions: Safety awareness training;Verbal cues  Sit to Stand: Stand-by asssistance  Gait Training  Gait  Base of Support: Center of gravity altered  Speed/Kami: Slow  Gait Abnormalities: Decreased step clearance; Ataxic  Ambulation - Level of Assistance: Contact guard assistance  Distance (ft): 250 Feet (ft)  Assistive Device: Walker, rolling;Gait belt  Interventions: Safety awareness training;Verbal cues; Tactile cues  Gait Abnormalities: Decreased step clearance; Ataxic  With 4 turns. Wheelchair Mobility/Mgmt  Therapeutic Exercise: Pt presented supine in bed. Pt able to complete supine>sit with (S) and scooting at EOB. Pt somewhat impulsive and required cues for safety awareness throughout session. Gait training with abovementioned details and w/c follow for safety. Pt demonstrated min sway with slight ataxic gait pattern. Standing dynamic balance challenged with reaching down out of ADEEL for items and tossing into basket on floor with 1 UE support and CGA due to min sway noted. Seated B LE TE rendered with 2# AW to build strength and endurance for improved quality of ambulation and ease of stair negotiation: HR/TR, LAQ, hip flexion, resisted hip abd (GTB), ball squeezes 2x20 with occasional verbal cues for proper technique. Patient/Caregiver Education:   Pt /Caregiver Education on safety and fall prevention was provided to reduce risk of falls. ASSESSMENT:  Patient continues to benefit from Skilled PT services to improve strength, balance, gait, transfers and safety awareness. Progression toward goals:  [ ]      Improving appropriately and progressing toward goals  [X]      Improving slowly and progressing toward goals  [ ]      Not making progress toward goals and plan of care will be adjusted      Treatment session: 50 minutes.   Therapist:   Alesha Garcia PTA,          3/14/2017

## 2017-03-14 NOTE — PROGRESS NOTES
Problem: Self Care Deficits Care Plan (Adult)  Goal: *Acute Goals and Plan of Care (Insert Text)  OCCUPATIONAL THERAPY SHORT TERM GOALS   Initiated 3/10/2017 and to be accomplished within 2 Week(s)    1. Patient will perform Upper body ADLs with/without adaptive equipment with supervision/set-up. 2. Patient will perform Lower body ADLs with/without adaptive equipment with minimal assistance/contact guard assist .  3. Patient will perform toileting task with minimal assistance/contact guard assist with Fair safety to reduce falls risk. 4. Patient will perform functional transfers with Caro Baez and minimal assistance/contact guard assist.  5. Patient will perform standing static/dynamic balance activities for improved ADL/IADL function with minimal assistance/contact guard assist and Fair balance and safety awarenes. 6. Patient will improve Barthel index scores to atleast 60/100 to improve functional mobility. OCCUPATIONAL THERAPY LONG TERM GOALS   Initiated 3/10/2017 and to be accomplished within 3-4 Week(s)    1. Patient will perform Upper body ADLs with/without adaptive equipment with modified independence. 2. Patient will perform Lower body ADLs with/without adaptive equipment with modified independence. 3. Patient will perform toileting task with modified independence with Fair safety to reduce falls risk. 4. Patient will perform functional transfers with Rolling Walker and supervision/set-up and Fair balance and safety awareness. 5. Patient will perform standing static/dynamic activity for improved ADL/IADL function with modified independence an and Fair balance and safety awareness. 6. Patient will improve Barthel index score to 80/100 to improve independence with mobility. Therapist: Aram Viveros, OTR 3/10/2017   [address] [facility]   OCCUPATIONAL THERAPY DAILY TREATMENT NOTE        Patient: Kristine Sol  (40 y.o. male)                            Date: 3/14/2017  Attending Physician: Yadira Goldman MD  Primary Diagnosis: neurosarcoidosis    Treatment Diagnosis  Treatment Diagnosis: neurosarcoidosis    Precautions : Precautions at Admission: Fall, WBAT  Vital Signs:        Cognitive Status:  Mental Status  Neurologic State: Alert; Appropriate for age  Orientation Level: Oriented X4  Pain:        Gross Assessment:     Coordination:     Bed Mobility:     Transfers:  Functional Transfers  Sit to Stand: Stand-by asssistance  Bed to Chair: Stand-by asssistance (close)     Balance:  Balance  Sitting: Intact  Sitting - Static: Good (unsupported)  Sitting - Dynamic: Good (unsupported)  Standing: With support  Standing - Static: Fair  Standing - Dynamic : Fair        Therapeutic Activities:  Shadowed patient with bed mobility and bed>w/c transfer in order to assess safety and independence. Cueing needed for safety during transfers. Static standing activity with two hand release in order to increase standing balance and tolerance as well as challenge balance and stability needed for ADL tasks and transfers. PHAM noticed patient locks L knee during standing to increase stability. PHAM assessed standing balance with L knee slightly bent and patient unable to complete one hand release. PHAM informed PTA regarding L LE weakness. Patient was able to tolerate standing with 13:35 with SBA prior to requesting to sit. Therapeutic Exercises:  PROM of L UE, with prolonged stretch in order to increase ROM needed to safely complete UB ADLS. MHP x 10 mins applied to L UE prior to PROM to decrease pain/stiffness during ROM. Patient/Caregiver Education:    Pt. Sonny Chiu Education on see above.       ASSESSMENT:  Patient continues to demonstrate the need for skilled Occupational Therapy services to improve dynamic standing balance needed for bathing  Progression toward goals:  [X]      Improving appropriately and progressing toward goals  [ ]      Improving slowly and progressing toward goals  [ ] Not making progress toward goals and plan of care will be adjusted      Treatment session:   50 minutes     Therapist:    ANKIT Villalpando,  3/14/2017

## 2017-03-15 NOTE — ROUTINE PROCESS
[facility] BEDSIDE shift change report given to Guanako Nguyen LPN, oncoming nurse) by Benjie Rollins RN (offgoing nurse).  Report included the following information, SBAR, KARDEX and Mar. Orders and meds complete for 7-3

## 2017-03-15 NOTE — PROGRESS NOTES
FAROOQ spoke with pt's mother Rosario Shipman w# [phone] to inform her that SW will be the  for any d/c needs for pt. SW informed her of the rehab process and informed her that pt's insurance [employer] determines his length of stay on the unit. She indicated that pt needs to be able to walk before returning home. Both she and her  work during the day and pt will be home alone. She also indicated that she was in the process of looking into adult  care to get pt out of the home but pt was hospitalized before she could contact anyone. FAROOQ agreed to assist with this. Pt's mother also stated that she can make accommodations for pt to stay on the 1st floor. Pt's mother seems supportive and willing to assist with d/c planning.

## 2017-03-15 NOTE — PROGRESS NOTES
Patient attempted to leave floor to smoke cigarette. Has cigarettes in room. Denies having lighter/matches. Willingly gave 1 cigarette to nurse (placed in baggie in patient bin). Has more in room. Will attempt to collect. Encouraged patient albert speak to MD about smoking cessation. Patient refused.

## 2017-03-15 NOTE — PROGRESS NOTES
Problem: Mobility Impaired (Adult and Pediatric)  Goal: *Therapy Goal (Edit Goal, Insert Text)  PHYSICAL THERAPY STG GOALS :  Initiated 3/11/2017 and to be accomplished within 1 Weeks    1. Patient will transfer from bed to chair and chair to bed with modified independence using FWW.  2. Patient will perform sit to stand with modified independence with Good balance and safety awareness. 3. Patient will ambulate with modified independence for 200 feet with FWW on level surfaces with 2 turns. 4. Patient will ascend/descend 2 stairs with no handrail(s) with supervision/set-up to allow for safe home access/exit. 5. Patient will improve standardized test score for [facility] Standing Balance Scale to 5 to improve stability during manipulation of the home environment. Physical Therapist: Burke William on 3/11/2017        [facility]   PHYSICAL THERAPY DAILY TREATMENT NOTE        Patient: Luciano Viera (40 y.o. male)               Date: 3/15/2017    Physician: Cassandra Nelson MD  Primary Diagnosis: neurosarcoidosis          Treatment Diagnosis  Treatment Diagnosis: neurosarcoidosis   Precautions: Fall, WBAT  Vital Signs  Cognitive Status:  Mental Status  Neurologic State: Alert; Appropriate for age  Orientation Level: Oriented X4  Pain  Bed Mobility Training  Balance  Sitting: Without support  Sitting - Static: Good (unsupported)  Sitting - Dynamic: Fair (occasional) (+)  Standing: With support  Standing - Static: Fair (+)  Standing - Dynamic : Fair  Transfer Training  Transfer Training  Sit to Stand: Stand-by asssistance  Stand to Sit: Stand-by asssistance  Sit to Stand: Stand-by asssistance  Gait Training  Gait  Base of Support: Center of gravity altered  Speed/Kami: Slow  Gait Abnormalities: Decreased step clearance; Path deviations;Trunk sway increased  Ambulation - Level of Assistance: Contact guard assistance  Distance (ft): 250 Feet (ft)  Assistive Device: Walker, rolling;Gait belt  Interventions: Safety awareness training;Verbal cues  Gait Abnormalities: Decreased step clearance; Path deviations;Trunk sway increased     With 4 turns. Wheelchair Mobility/Mgmt  Therapeutic Exercise: Pt presented sitting at EOB. Pt demonstrated good seated balance at EOB. Gait training with abovementioned details and w/c follow for safety. Pt ambulated 250ft x2 with verbal cues to facilitate step height and for safety awareness. Pt tends to have increased trunk sway and R lateral lean when pt looks around during ambulation with less focus on walking. Additional gait training provided at // to promote wider ADEEL, step height and control. Pt performed side step walking, high knee walking, and drills for wider ADEEL 2x15ft each set with CGA. Standing B LE TE rendered to promote strength and balance for improved gait and ease of stair negotiation: Hip flexion, hip abd, HR/TR x20 with CGA and verbal/visual cues for proper technique. Seated LAQ with 2.5# AW 2x20 with 5\" hold. Standing dynamic balance with no UE support for ~3' while reaching down and forward slightly out of ADEEL with CGA/SBA and no noted LOB, though pt occasionally presented with L lateral lean. At end of session; pt sitting in bedside chair with call bell. Patient/Caregiver Education:   Pt /Caregiver Education on safety and fall prevention was provided to reduce risk of falls. ASSESSMENT:  Patient continues to benefit from Skilled PT services to improve transfers, gait, dynamic balance, stair negotiation, strength. Progression toward goals:  [X]      Improving appropriately and progressing toward goals  [X]      Improving slowly and progressing toward goals  [ ]      Not making progress toward goals and plan of care will be adjusted      Treatment session: 50 minutes.   Therapist:   Anya Ocasio PTA,          3/15/2017

## 2017-03-15 NOTE — PROGRESS NOTES
GIM     Patient: Britta Fierro. MRN: [id number]  CSN: [id number]    YOB: 1980  Age: 39 y.o. Sex: male    DOA: 3/9/2017 LOS:  LOS: 6 days                    Subjective:     Pt stable and to complete antibiotics fri. No headache and still on decadron. And perminate prdnisone. Objective:      Visit Vitals    /69    Pulse 83    Temp 97.6 °F (36.4 °C)    Resp 18    Ht 6' 2\" (1.88 m)    Wt 86 kg (189 lb 8 oz)    SpO2 97%    BMI 24.33 kg/m2       Physical Exam:  Chest clear  Cor rr. abd soft  No edema    Intake and Output:  Current Shift:     Last three shifts:  03/13 1901 - 03/15 0700  In: -   Out: 500 [Urine:500]    No results found for this or any previous visit (from the past 24 hour(s)). Current Facility-Administered Medications   Medication Dose Route Frequency    [START ON 3/16/2017] dexamethasone (DECADRON) tablet 4 mg  4 mg Oral Q12H    [START ON 3/24/2017] predniSONE (DELTASONE) tablet 10 mg  10 mg Oral DAILY WITH BREAKFAST    cefTRIAXone (ROCEPHIN) 2 g in 0.9% sodium chloride (MBP/ADV) 50 mL MBP  2 g IntraVENous Q12H    dexamethasone (DECADRON) tablet 4 mg  4 mg Oral Q8H    azaTHIOprine (IMURAN) tablet 50 mg  50 mg Oral DAILY    acetaminophen-codeine (TYLENOL #3) per tablet 1 Tab  1 Tab Oral Q4H PRN    buPROPion XL (WELLBUTRIN XL) tablet 300 mg  300 mg Oral 7am    tamsulosin (FLOMAX) capsule 0.4 mg  0.4 mg Oral DAILY    magnesium hydroxide (MILK OF MAGNESIA) 400 mg/5 mL oral suspension 30 mL  30 mL Oral DAILY PRN    bisacodyl (DULCOLAX) suppository 10 mg  10 mg Rectal DAILY PRN    sodium phosphate (FLEET'S) enema 118 mL  1 Enema Rectal PRN       Lab Results   Component Value Date/Time    Glucose 91 03/08/2017 04:48 AM    Glucose 99 03/07/2017 07:00 AM    Glucose 98 03/06/2017 07:15 AM    Glucose 101 03/04/2017 04:20 AM    Glucose 90 03/02/2017 04:50 PM        Assessment/Plan     Active Problems:    * No active hospital problems.  Destini Avilez, MD  3/15/2017, 10:59 AM

## 2017-03-15 NOTE — PROGRESS NOTES
Problem: Self Care Deficits Care Plan (Adult)  Goal: *Acute Goals and Plan of Care (Insert Text)  OCCUPATIONAL THERAPY SHORT TERM GOALS   Initiated 3/10/2017 and to be accomplished within 2 Week(s)    1. Patient will perform Upper body ADLs with/without adaptive equipment with supervision/set-up. 2. Patient will perform Lower body ADLs with/without adaptive equipment with minimal assistance/contact guard assist .  3. Patient will perform toileting task with minimal assistance/contact guard assist with Fair safety to reduce falls risk. 4. Patient will perform functional transfers with Devorah Barer and minimal assistance/contact guard assist.  5. Patient will perform standing static/dynamic balance activities for improved ADL/IADL function with minimal assistance/contact guard assist and Fair balance and safety awarenes. 6. Patient will improve Barthel index scores to atleast 60/100 to improve functional mobility. OCCUPATIONAL THERAPY LONG TERM GOALS   Initiated 3/10/2017 and to be accomplished within 3-4 Week(s)    1. Patient will perform Upper body ADLs with/without adaptive equipment with modified independence. 2. Patient will perform Lower body ADLs with/without adaptive equipment with modified independence. 3. Patient will perform toileting task with modified independence with Fair safety to reduce falls risk. 4. Patient will perform functional transfers with Rolling Walker and supervision/set-up and Fair balance and safety awareness. 5. Patient will perform standing static/dynamic activity for improved ADL/IADL function with modified independence an and Fair balance and safety awareness. 6. Patient will improve Barthel index score to 80/100 to improve independence with mobility. Therapist: Christina Gonzalez, OTR 3/10/2017   [facility]   OCCUPATIONAL THERAPY DAILY TREATMENT NOTE        Patient: Aarti Phipps.  (40 y.o. male)                            Date: 3/15/2017  Attending Physician: Lisa Huerta MD  Primary Diagnosis: neurosarcoidosis    Treatment Diagnosis  Treatment Diagnosis: neurosarcoidosis    Precautions : Precautions at Admission: Fall, WBAT  Vital Signs:        Cognitive Status:  Mental Status  Neurologic State: Alert; Appropriate for age  Orientation Level: Oriented X4  Pain:        Gross Assessment:     Coordination:     Bed Mobility:  Bed Mobility  Supine to Sit: Supervision  Sit to Supine: Supervision  Transfers:  Functional Transfers  Sit to Stand: Stand-by asssistance  Stand to Sit: Stand-by asssistance     Balance:  Balance  Sitting: Without support  Sitting - Static: Good (unsupported)  Sitting - Dynamic: Fair (occasional) (+)  Standing: With support  Standing - Static: Fair (+)  Standing - Dynamic : Fair  ADL Self Care:     ADL Intervention:           Toileting  Toileting Assistance: Supervision/set up (utilizing urinal)                    Therapeutic Activities:  Shadowed patient with standard chair to w/c transfer in order to assess safety and independence during routine. Static standing activity with two hand release in order to increase standing balance and tolerance needed for ADLS. Patient was able to tolerate standing for 6 mins prior to requesting to sit. PHAM noticed increased R leaning with prolonged standing task. Practiced weight shifting activity in order to challenge balance and stability needed for ADL tasks. Patient demonstrates increased R lateral leaning when lifting L LE. Shadowed patient with toileting routine in order to assess safety and independence during routine. See above for levels of A needed. PHAM educated patient on standing by bedrail when utilizing urinal for optimal safety. Patient verbalized understanding. Therapeutic Exercises:  UB strengthening with 5# and 2# on L UE in order to increase functional activity tolerance needed for ADL tasks and transfers.  Provided as well as reviewed Self ROM exericises in order to increase independence and performance with HEP in room to reduce L hand contracture. Patient/Caregiver Education:    Pt. Ford Stephens Education on see above.       ASSESSMENT:  Patient continues to demonstrate the need for skilled Occupational Therapy services to improve dynamic standing balance needed for bathing  Progression toward goals:  [X]      Improving appropriately and progressing toward goals  [ ]      Improving slowly and progressing toward goals  [ ]      Not making progress toward goals and plan of care will be adjusted      Treatment session:   65 minutes     Therapist:    ANKIT Lawrence,  3/15/2017

## 2017-03-15 NOTE — PROGRESS NOTES
Joseph faxed clinical update to Heide Montano at [facility] to request an extension of pt's snf stay.

## 2017-03-16 NOTE — PROGRESS NOTES
Problem: Mobility Impaired (Adult and Pediatric)  Goal: *Therapy Goal (Edit Goal, Insert Text)  PHYSICAL THERAPY STG GOALS :  Initiated 3/11/2017 and to be accomplished within 1 Weeks (Updated 3/16/17)     1. Patient will transfer from bed to chair and chair to bed with modified independence using FWW. (Progressing; CGA)   2. Patient will perform sit to stand with modified independence with Good balance and safety awareness. (Progressing; ((S) with occasional cues for safety awareness and good balance)   3. Patient will ambulate with modified independence for 200 feet with FWW on level surfaces with 2 turns. (Progessing; 250ft with 4 turns and SBA/CGA)   4. Patient will ascend/descend 2 stairs with no handrail(s) with supervision/set-up to allow for safe home access/exit. (Progressing; 5 steps with R HR and CGA with cues for safety awareness)   5. Patient will improve standardized test score for [facility] Balance Scale to 5 to improve stability during manipulation of the home environment. (Progressing; 3+)     Physical Therapist: Mercy Richard on 3/11/2017        [facility]   PHYSICAL THERAPY WEEKLY PROGRESS REPORT  Reporting Period:  Date:  3/11/17 to 3/16/17        Patient: Marlee Sweeney.  (40 y.o. male)                         Date: 3/16/2017    Primary Diagnosis: neurosarcoidosis                  Attending Physician: Samaria Mathias MD   Treatment Diagnosis  Treatment Diagnosis: neurosarcoidosis   Precautions:  Fall, WBAT  Rehab Potential : Good:     Skill interventions and education provided with clinical rationale (include individualized treatment techniques and standardized tests):   Skilled Physical Therapy services were provided with  TA to promote Mod (I) with bed mobility and transfers, TE to build strength and endurance for improved functional mobility, Gait training to address deficits and allow pt to return to PLOF, Stair training to allow pt to safely return home upon DC, Neuromuscular reeducation of movement, coordination and balance for reduced risk of falls. Using a comparative statement, summarize significant progress toward goals as a result of skilled intervention provided:  Patient has made Good progress towards their Physical Therapy goals in the areas of bed mobility, transfers and gait. Identify remaining functional areas, impairments limiting progress and/or barriers to improvement:  Patient would benefit from continues PT services to address the following functional deficits in stair negotiation, gait, dynamic balance and strength.        OBJECTIVE DATA SUMMARY:       INITIAL ASSESSMENT WEEKLY ASSESSMENT   COGNITIVE STATUS COGNITIVE STATUS   Neurologic State: Alert  Orientation Level: Oriented X4  Cognition: Appropriate for age attention/concentration, Appropriate safety awareness, Follows commands  Perception: Cues to maintain midline in sitting, Cues to maintain midline in standing, Tactile, Verbal  Perseveration: No perseveration noted  Safety/Judgement: Fall prevention Neurologic State: Alert, Appropriate for age  Orientation Level: Oriented X4  Cognition: Follows commands  Perception: Cues to maintain midline in sitting, Cues to maintain midline in standing, Tactile, Verbal  Perseveration: No perseveration noted  Safety/Judgement: Fall prevention   PAIN PAIN   Pain Scale 1: Numeric (0 - 10)  Pain Intensity 1: 0  Pain Location 1: Arm  Pain Orientation 1: Left  Patient Stated Pain Goal: 0  Pain Reassessment 1: Yes Pain Scale 1: Numeric (0 - 10)  Pain Intensity 1: 0  Pain Location 1: Arm  Pain Orientation 1: Left  Patient Stated Pain Goal: 0  Pain Reassessment 1: Yes   GROSS ASSESSMENT GROSS ASSESSMENT   AROM: Generally decreased, functional  PROM: Generally decreased, functional  Strength: Generally decreased, functional  Coordination: Generally decreased, functional  Tone:  (chronic flexor tone at L hand from previous CVA)  Sensation: Impaired AROM: Generally decreased, functional  PROM: Generally decreased, functional  Strength: Generally decreased, functional  Coordination: Generally decreased, functional  Tone: Abnormal  Sensation: Impaired   BED MOBILITY BED MOBILITY   Supine to Sit: Contact guard assistance  Sit to Supine: Minimum assistance  Scooting: Contact guard assistance Supine to Sit: Supervision  Sit to Supine: Supervision  Scooting: Supervision   GAIT GAIT   Base of Support: Shift to left, Shift to right, Other (comment) (variable lean, leans to L, LOB then leans to R)  Speed/Kami: Pace decreased (<100 feet/min), Slow  Step Length: Left shortened, Right shortened  Swing Pattern: Left asymmetrical, Right asymmetrical  Stance: Left decreased, Right increased, Weight shift  Gait Abnormalities: Decreased step clearance, Hemiplegic, Path deviations, Trunk sway increased, Other (leans to L, LOB when loss of , then Lean to R)  Ambulation - Level of Assistance: Contact guard assistance  Distance (ft): 185 Feet (ft) (133 & 100 feet for f/u rounds of walking)  Assistive Device: Walker, rolling, Other (comment) (trained with quad cane for last 100 feet of ambulation today)  Rail Use: Right   Stairs - Level of Assistance: Contact guard assistance  Number of Stairs Trained: 5 (x3)  Interventions: Verbal cues, Visual/Demos Base of Support: Center of gravity altered  Speed/Akmi: Slow  Step Length: Right shortened, Left shortened  Swing Pattern: Left asymmetrical, Right asymmetrical  Stance: Left decreased, Right increased, Weight shift  Gait Abnormalities: Decreased step clearance, Path deviations, Trunk sway increased  Ambulation - Level of Assistance: Contact guard assistance  Distance (ft): 250 Feet (ft)  Assistive Device: Walker, rolling, Gait belt  Rail Use: Right   Stairs - Level of Assistance: Contact guard assistance  Number of Stairs Trained: 5 (x3)  Interventions: Safety awareness training, Verbal cues                     TRANSFERS TRANSFERS   Sit to Stand: Minimum assistance, Additional time, Assist x1  Stand to Sit: Contact guard assistance, Additional time, Assist x1  Bed to Chair: Contact guard assistance Sit to Stand: Supervision  Stand to Sit: Supervision  Bed to Chair: Contact guard assistance   BALANCE BALANCE   Sitting: Impaired  Sitting - Static: Fair (occasional) (+)  Sitting - Dynamic: Fair (occasional)  Standing: Pull to stand, With support  Standing - Static: Fair (-)  Standing - Dynamic : Poor (+) Sitting: Without support  Sitting - Static: Good (unsupported)  Sitting - Dynamic: Fair (occasional) (+)  Standing: With support  Standing - Static: Good  Standing - Dynamic : Fair   WHEELCHAIR MOBILITY/MGMT WHEELCHAIR MOBILITY/MGMT         Activity Tolerance:  Good Activity Tolerance: Good   Visual/Perceptual   Diplopia: Yes  Acuity: Able to read employee name badge without difficulty  Corrective Lenses:  (eye patch for L eye)        Visual/Perceptual   Vision  Diplopia: Yes  Acuity: Able to read employee name badge without difficulty  Corrective Lenses:  (eye patch for L eye)         Auditory:   Auditory Impairment: None      Auditory:   Auditory  Auditory Impairment: None         Steps: right   Clinical Decision making:  [place] standing balance score: 3+ Clinical Decision making:  [place] standing balance score: 3+      Treatment:   Pt presented supine in bed. Pt demonstrated supine>sit with (S) and no need cues for safety. Sit<>stand with (S), but occasional verbal cues for safety as pt is impulsive and moves quickly. Stair training provided. Following training pt able to complete 5 steps x3 with R HR and CGA. Pt required verbal cues for sequencing and safety. Seated B LE TE's 2.5# AW to promote strength and endurance: LAQ, hip flexion, resisted hip abd (GTB), ball squeezes 2x20. Pt required assistance with ambulation to bathroom with RW and SBA/CGA ~20ft. Pt able to complete all toileting tasks with (S).  At end of session; pt requested to go back to bed. Therapist attempted to encourage pt to sit up OOB, but pt declined. Patient's response to treatment rendered:  Good      Patient expected Discharge Location:  [X]Private Residence  [ ] [facility]  [ [facility]  [ ]Other:     Plan: Continue Skilled PT services as established by the Plan of Care for 5-6 times a week. PT and Assistant have had a weekly case conference regarding the above treatment:  [X] Yes     [ ] No        Treatment session:  36 minutes. Therapist: Son Rajan, PTA       Date:3/16/2017  Forward to PT for co-signature when completed.

## 2017-03-16 NOTE — PROGRESS NOTES
[facility] and pt's last covered day will be 3/18/17. JOSEPH met with pt to inform him of the denial. SW reviewed the notice with pt and the appeal process. Pt signed the notice and was given a copy. Pt stated that he doesn't want to appeal the decision and  is prepared for d/c home on 3/19/17. JOSEPH called pt's mother from pt's room re: denial by Taawna Johnson. JOSEPH informed her as well that pt is against Adult . She stated that pt doesn't have a choice when he comes home. JOSEPH will follow-up re: criteria and get back with pt's mother.

## 2017-03-16 NOTE — ROUTINE PROCESS
Bedside and verbal shift change report given to Briana Perez [phone]  (oncoming nurse)by Skip JHAVERIN(off going nurse) Report included SBAR, Kardex, Mars and recent results. Urine collected and sent to lab. Bedside and verbal shift report given to [address] [place] [address] LPN (oncoming nurse) by Morteza Hernandez LPN (offgoing nurse. Report included SBAR, Kardex, Mars and recent results.

## 2017-03-16 NOTE — PROGRESS NOTES
Nutrition follow up-TCC/  Plan of care      RECOMMENDATIONS:     1. Regular diet  2. Monitor weight, labs and PO intake  3. RD to follow     GOALS:     1. Met/Ongoing: PO intake meets >75% of protein/calorie needs by 3/23  2. Met/Ongoing: Weight Maintenance (+/- 1-2 lb by 3/23)    ASSESSMENT:     Weight status is classified as normal per BMI of 22.8. PO intake is adequate. Weight stable from UBW. New labs n/a on record. Nutrition recommendations listed. RD to follow. Nutrition Risk:  [] High  [] Moderate [x]  Low    SUBJECTIVE/OBJECTIVE:     Transferred from  Neuro to [facility] on 3/9/17. Patient reports good appetite and eating all of meals. 100% intake of breakfast meal. Denies food allergies and problems with chewing/swallowing. Reviewed SLP note- cleared for regular texture and thin liquids. Weight stable from UBW of 176 lb. Encouraged adequate intake. Will monitor.      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    Headache R51     Past Medical History:   Diagnosis Date    Arm pain     CVA (cerebrovascular accident) ([facility] [address] 2012    Meningitis     Meningitis     Neurosarcoidosis ([facility] [address] 2012      Labs:    Lab Results   Component Value Date/Time    Sodium 141 03/08/2017 04:48 AM    Potassium 3.5 03/08/2017 04:48 AM    Chloride 106 03/08/2017 04:48 AM    CO2 26 03/08/2017 04:48 AM    Anion gap 9 03/08/2017 04:48 AM    Glucose 91 03/08/2017 04:48 AM    BUN 9 03/08/2017 04:48 AM    Creatinine 0.65 03/08/2017 04:48 AM    Calcium 7.9 03/08/2017 04:48 AM    Albumin 2.4 03/08/2017 04:48 AM     Anthropometrics: BMI (calculated): 22.8  Last 3 Recorded Weights in this Encounter 03/09/17 1943 03/15/17 1100   Weight: 86 kg (189 lb 8 oz) 80.5 kg (177 lb 6.4 oz)      Ht Readings from Last 1 Encounters:   03/15/17 6' 2\" (1.88 m)     No data found. Nutrition Needs:   Calories: 2510 Kcal    Protein:    g      [x] No Cultural, Roman Catholic or ethnic dietary need identified.     [] Cultural, Roman Catholic and ethnic food preferences identified and addressed     Wt Status:  [x] Normal (18.6 - 24.9) [] Underweight (<18.5) [] Overweight (25 - 29.9) [] Mild Obesity (30 - 34.9)  [] Moderate Obesity (35 - 39.9) [] Morbid Obesity (40+)     Nutrition Problems Identified:   [] Suboptimal PO intake   [] Food Allergies  [] Difficulty chewing/swallowing/poor dentition  [] Constipation/Diarrhea   [] Nausea/Vomiting   [x] None  [] Other:     Plan:   [] Therapeutic Diet  []  Obtained/adjusted food preferences/tolerances and/or snacks options   []  Supplements added   [] Occupational therapy following for feeding techniques  [x]  HS snack added   []  Modify diet texture   []  Modify diet for food allergies   []  Assist with menu selection   [x]  Monitor PO intake on meal rounds   [x]  Continue inpatient monitoring and intervention   [x]  Participated in discharge planning/Interdisciplinary rounds/Team meetings   []  Other:     Education Needs:   [] Not appropriate for teaching at this time due to:   [x] Identified and addressed    Nutrition Monitoring and Evaluation:  [x] Continue ongoing monitoring and intervention  [] Parmjit Muñoz

## 2017-03-16 NOTE — PROGRESS NOTES
conducted a Follow up consultation and Spiritual Assessment for Tin De AndaMary, who is a 39 y.o.,male. The  provided the following Interventions:  Continued the relationship of care and support. Listened empathically. Offered prayer and assurance of continued prayer on patients behalf. Chart reviewed. The following outcomes were achieved:  Patient expressed gratitude for pastoral care visit. Assessment:  There are no further spiritual or Evangelical issues which require Spiritual Care Services interventions at this time. Plan:  Chaplains will continue to follow and will provide pastoral care on an as needed/requested basis.  recommends bedside caregivers page  on duty if patient shows signs of acute spiritual or emotional distress.      [address] [facility]   Staff [address] [facility]   [phone]

## 2017-03-16 NOTE — PROGRESS NOTES
Problem: Self Care Deficits Care Plan (Adult)  Goal: *Acute Goals and Plan of Care (Insert Text)  OCCUPATIONAL THERAPY SHORT TERM GOALS     Updated 3/16/17    1. Patient will perform Upper body ADLs with/without adaptive equipment with Mod I.  2. Patient will perform Lower body ADLs with/without adaptive equipment with SBA. 3. Patient will perform toileting task with Supervision with Fair+ safety to reduce falls risk. 4. Patient will perform functional transfers with Jeremy Michael and Supervision. 5. Patient will perform standing static/dynamic balance activities for improved ADL/IADL function with Supervision and Good balance and safety awarenes. 6. Patient will improve Barthel index scores to atleast 80/100 to improve functional mobility. Initiated 3/10/2017 and to be accomplished within 2 Week(s)    1. Patient will perform Upper body ADLs with/without adaptive equipment with supervision/set-up. (goal met-UPG Mod I)  2. Patient will perform Lower body ADLs with/without adaptive equipment with minimal assistance/contact guard assist . (goal met-UPG SBA)  3. Patient will perform toileting task with minimal assistance/contact guard assist with Fair safety to reduce falls risk. (goal met-UPG Supervision)  4. Patient will perform functional transfers with Jeremy Michael and minimal assistance/contact guard assist. (goal met-UPG Supervision)  5. Patient will perform standing static/dynamic balance activities for improved ADL/IADL function with minimal assistance/contact guard assist and Fair balance and safety awarenes. (goal met-UPG Supervision)  6. Patient will improve Barthel index scores to atleast 60/100 to improve functional mobility. (goal met-UPG 80/100)    OCCUPATIONAL THERAPY LONG TERM GOALS   Initiated 3/10/2017 and to be accomplished within 3-4 Week(s)    1. Patient will perform Upper body ADLs with/without adaptive equipment with modified independence.   2. Patient will perform Lower body ADLs with/without adaptive equipment with modified independence. 3. Patient will perform toileting task with modified independence with Fair safety to reduce falls risk. 4. Patient will perform functional transfers with Rolling Walker and supervision/set-up and Fair balance and safety awareness. 5. Patient will perform standing static/dynamic activity for improved ADL/IADL function with modified independence an and Fair balance and safety awareness. 6. Patient will improve Barthel index score to 80/100 to improve independence with mobility. Therapist: Maris Hernandez. 3/10/2017   [facility]  OCCUPATIONAL THERAPY WEEKLY SUMMARY   Reporting period:  from 3/10/17 through 3/16/17         Patient: Femi Clement. (40 y.o. male)                                   Date: 3/16/2017    Primary Diagnosis: neurosarcoidosis                              Attending Physician: Ky Hobbs MD Treatment Diagnosis  Treatment Diagnosis: neurosarcoidosis   Precautions: Fall, WBAT  Rehab Potential : Good     Skill interventions and education provided with clinical rationale (include individualized treatment techniques and standardized tests):  Skilled Occupational services were provided utilizing therapeutic exericises, therapeutic activities, functional mobility, functional transfers, and EC/WS. Using a comparative statement, summarize significant progress toward goals as a result of skilled intervention provided:  Patient has made Good progress towards their Occupational Therapy goals in the following areas: increased independence and performance with grooming, bathing, upper body dressing, lower body dressing, toileting and toilet transfer. Patient has met 6/6 STGS and making good progression towards LTGS.   Identify remaining functional areas, impairments limiting progress and/or barriers to improvement:  Patient would benefit from continued skilled Occupational Therapy Services to address the following functional deficits in decreased dynamic standing balance needed for ADLS. OBJECTIVE DATA SUMMARY:          INITIAL ASSESSMENT WEEKLY PROGRESS   COGNITIVE STATUS: COGNITIVE STATUS:   Neurologic State: Alert  Orientation Level: Oriented X4  Cognition: Appropriate for age attention/concentration, Appropriate safety awareness, Follows commands  Perception: Cues to maintain midline in sitting, Cues to maintain midline in standing, Tactile, Verbal  Perseveration: No perseveration noted  Safety/Judgement: Fall prevention Neurologic State: Alert, Appropriate for age  Orientation Level: Oriented to place  Cognition: Follows commands  Perception: Cues to maintain midline in sitting, Cues to maintain midline in standing, Tactile, Verbal  Perseveration: No perseveration noted  Safety/Judgement: Fall prevention   PAIN: PAIN:   Pain Scale 1: Numeric (0 - 10)  Pain Intensity 1: 0  Pain Location 1: Arm  Pain Orientation 1: Left  Patient Stated Pain Goal: 0  Pain Reassessment 1: Yes       Pain Scale 1: Numeric (0 - 10)  Pain Intensity 1: 0  Pain Location 1: Arm  Pain Orientation 1: Left  Patient Stated Pain Goal: 0  Pain Reassessment 1: Yes   BED MOBILITY BED MOBILITY   Supine to Sit: Contact guard assistance  Sit to Supine: Minimum assistance  Scooting: Contact guard assistance Supine to Sit: Supervision  Sit to Supine: Supervision  Scooting: Supervision   ADL SELF CARE ADL SELF CARE   Feeding: Setup  Oral Facial Hygiene/Grooming: Minimum assistance  Bathing: Minimum assistance  Upper Body Dressing: Minimum assistance  Lower Body Dressing: Moderate assistance  Toileting:  Moderate assistance Bathing Assistance: Modified independent  Position Performed: Seated in chair     Dressing Assistance: Modified independent  Pullover Shirt: Modified independent     Bathing Assistance: Supervision/set-up  Perineal  : Supervision/set-up  Position Performed: Standing, Seated in chair  Lower Body : Supervision/set-up  Position Performed: Seated in chair     Toileting Assistance: Supervision/set up  Bladder Hygiene: Supervision/set-up           Dressing Assistance: Supervision/set up  Underpants: Supervision/set-up  Pants With Elastic Waist: Supervision/set-up  Socks:  Moderate assistance  Leg Crossed Method Used: No  Position Performed: Bending forward method, Seated in chair  Cues: Manish Mederos         TRANSFERS TRANSFERS   Sit to Stand: Minimum assistance, Additional time, Assist x1  Stand to Sit: Contact guard assistance, Additional time, Assist x1  Bed to Chair: Contact guard assistance Sit to Stand: Stand-by asssistance  Stand to Sit: Stand-by asssistance  Bed to Chair: Contact guard assistance   Bathroom Mobility: Stand-by assistance (close) Bathroom Mobility: Stand-by assistance (close)   BALANCE BALANCE   Sitting: Impaired  Sitting - Static: Fair (occasional) (+)  Sitting - Dynamic: Fair (occasional)  Standing: Pull to stand, With support  Standing - Static: Fair (-)  Standing - Dynamic : Poor (+) Sitting: Without support  Sitting - Static: Good (unsupported)  Sitting - Dynamic: Fair (occasional) (+)  Standing: With support  Standing - Static: Fair (+)  Standing - Dynamic : Fair         GROSS ASSESSMENT  GROSS ASSESSMENT   AROM: Generally decreased, functional  PROM: Generally decreased, functional  Strength: Generally decreased, functional  Coordination: Generally decreased, functional  Tone:  (chronic flexor tone at L hand from previous CVA)  Sensation: Impaired AROM: Generally decreased, functional  PROM: Generally decreased, functional  Strength: Generally decreased, functional  Coordination: Generally decreased, functional  Tone: Abnormal  Sensation: Impaired   COORDINATION COORDINATION   Fine Motor Skills-Upper: Left Impaired, Right Intact  Gross Motor Skills-Upper: Left Impaired, Right Intact Fine Motor Skills-Upper: Left Impaired, Right Intact  Gross Motor Skills-Upper: Left Impaired, Right Intact VISUAL/PERCEPTUAL VISUAL/PERCEPTUAL   Diplopia: Yes  Acuity: Able to read employee name badge without difficulty  Corrective Lenses:  (eye patch for L eye)   Diplopia: Yes  Acuity: Able to read employee name badge without difficulty  Corrective Lenses:  (eye patch for L eye)     AUDITORY: AUDITORY:   Auditory Impairment: None Auditory Impairment: None         INSTRUMENTAL  ADL'S:    INSTRUMENTAL ADL'S:                  THE BARTHEL INDEX  ACTIVITY    SCORE   FEEDING  0=unable  5=needs help cutting,spreading butter,etc., or modified diet  10= independent    10   BATHING  0=dependent  5=independent (or in shower    0   GROOMING  0=needs help  5=independent face/hair/teeth/shaving (implements provided)    5   DRESSING  0=dependent  5=needs help but can do about half unaided  10=independent(including buttons, zips,laces etc.)    5   BOWELS  0=incontinent  5=occasional accident  10=continent    10   BLADDER  0=incontinent, or catheterized and unable to manage alone  5=occasional accident  10=continent    10   TOILET USE  0=dependent  5=needs some help, but can do something alone  10=independent (on and off, dressing, wiping)    5   TRANSFER (BED TO CHAIR AND BACK)  0=unable, no sitting balance  5=major help(one or two people,physical), can sit  10=minor help(verbal or physical)  15=independent    10   MOBILITY (ON LEVEL SURFACES)  0=immobile or <50 yards  5=wheelchair independent,including corners,>50 yards  10=walkes with help of one person (verbal or physical) >50 yards  15=independent(but may use any aid; for example, stick) >50 yards    10   STAIRS  0=unable  5=needs help (verbal, physical, carrying aid)  10=independent    0             TOTAL:                  65/100      Treatment:  Completed morning ADLs with patient in order to assess safety and independence during routine. See above for levels of A needed.  Functional mobility utilizing RW to/from bathroom in order to assess safety and independence during routine. Patient demonstrated 1 LOB during mobility towards R side today. PHAM educated patient on threading LB dressing seated, locking w/c prior to standing and keeping B UE on RW during mobility to reduce risk for falls. Patient continues to require cueing to keep RW with him during all mobility and standing task as well as complete mobility with supervision for optimal safety. Patient's response to Treatment rendered:  Patient is pleasant and receptive to therapist cueing. Patient expected Discharge Location:  [X]Private Residence  [ ] JANET/ILF  [ [facility]  [ ]Other:     Plan: Continue OT services as established on the Plan of Care for 5 times a week.   Treatment Minutes:  39  OT and Assistant have had a weekly case conference regarding the above treatment:  [ ] Yes     [ ] No    Therapist:   Karen Charles,         Date:3/16/2017      Forward to OT for co-signature when completed

## 2017-03-16 NOTE — PROGRESS NOTES
Late entry: FAROOQ left message for Kavin Jay at [facility] requesting that she call FAROOQ back re: request for extension. FAROOQ faxed clinical information on yesterday with no response.

## 2017-03-17 NOTE — PROGRESS NOTES
SW  Met with pt's mother to address d/c concerns. Pt's mother reported that pt has been noncompliant with adl's and medications at home. He refuses ant recommendations that she has made re: adult . FAROOQ provided pt's mother with information on adult  through [employer] and [address] [place] [address] Avenue also contacted an adult care provide  per agreement with pt's mother to discuss residential care at her facility [facility] and was given a copy. Pt's mother verbalized frustration with managing pt at home due to his behavior. SW provided support.

## 2017-03-17 NOTE — PROGRESS NOTES
Problem: Mobility Impaired (Adult and Pediatric)  Goal: *Therapy Goal (Edit Goal, Insert Text)  PHYSICAL THERAPY STG GOALS :  Initiated 3/11/2017 and to be accomplished within 1 Weeks (Updated 3/16/17)     1. Patient will transfer from bed to chair and chair to bed with modified independence using FWW. (Progressing; CGA)   2. Patient will perform sit to stand with modified independence with Good balance and safety awareness. (Progressing; ((S) with occasional cues for safety awareness and good balance)   3. Patient will ambulate with modified independence for 200 feet with FWW on level surfaces with 2 turns. (Progessing; 250ft with 4 turns and SBA/CGA)   4. Patient will ascend/descend 2 stairs with no handrail(s) with supervision/set-up to allow for safe home access/exit. (Progressing; 5 steps with R HR and CGA with cues for safety awareness)   5. Patient will improve standardized test score for [facility] Balance Scale to 5 to improve stability during manipulation of the home environment. (Progressing; 3+)     Physical Therapist: Qamar Alcantar on 3/11/2017        [address] [facility]   PHYSICAL THERAPY DAILY TREATMENT NOTE        Patient: Coral Xavier  (40 y.o. male)               Date: 3/17/2017    Physician: Destiney Agosto MD  Primary Diagnosis: neurosarcoidosis          Treatment Diagnosis  Treatment Diagnosis: neurosarcoidosis   Precautions: Fall, WBAT  Vital Signs  Cognitive Status:  Pain  Bed Mobility Training  Bed Mobility Training  Supine to Sit: Supervision  Scooting: Supervision  Balance  Sitting: Without support  Sitting - Static: Good (unsupported)  Sitting - Dynamic: Fair (occasional) (+)  Standing: With support  Standing - Static: Good  Standing - Dynamic : Fair  Transfer Training  Transfer Training  Sit to Stand: Supervision  Stand to Sit: Supervision  Sit to Stand: Supervision  Gait Training  Gait  Base of Support: Center of gravity altered  Gait Abnormalities: Decreased step clearance; Path deviations;Trunk sway increased  Ambulation - Level of Assistance: Contact guard assistance;Stand-by asssistance  Distance (ft): 250 Feet (ft)  Assistive Device: Walker, rolling;Gait belt  Rail Use: Left   Stairs - Level of Assistance: Contact guard assistance;Stand-by asssistance  Number of Stairs Trained: 15  Interventions: Safety awareness training  Gait Abnormalities: Decreased step clearance; Path deviations;Trunk sway increased   With 4 turns. Wheelchair Mobility/Mgmt  Therapeutic Exercise: Pt with (S) for bed mobility and sit<>stand. Gait training rendered as noted above with w/c follow for safety. Pt ambulated with SBA for straightaways and CGA for turns. Pt negotiated 2 steps with no HR and use of RW with CGA. Pt reported that steps to go into his home are wide and would allow for use of RW. Pt negotiated 15 steps with L HR and SBA/CGA for ascending and CGA for descending. Pt did not require verbal cues for sequencing today and demonstrated good carryover from yesterday's session. Seated B LE TE with 2# AW rendered to promote strength and endurance for improved functional mobility: LAQ, ball squeezes 2x20, standing HR/TR, marches, hip abd 2x20. Patient/Caregiver Education:   Pt /Caregiver Education on safety and fall prevention, HEP was provided to reduce risk of falls and maximize gains. ASSESSMENT:  Patient continues to benefit from Skilled PT services for 1 additional session with DC planned for 3/19  Progression toward goals:  [X]      Improving appropriately and progressing toward goals  [ ]      Improving slowly and progressing toward goals  [ ]      Not making progress toward goals and plan of care will be adjusted      Treatment session: 50 minutes.   Therapist:   Teddy Howard PTA,          3/17/2017

## 2017-03-17 NOTE — PROGRESS NOTES
SW spoke with [address] [place] [address] liaison re: referral and pt's d/c home on 3/19/17.

## 2017-03-17 NOTE — DISCHARGE SUMMARY
[facility] SUMMARY    Name:  Leonardo Dalal  MR#:  [id number]  :  1980  Account #:  [de-identified]  Date of Adm:  2017  Date of Discharge:      FINAL DIAGNOSES  1. Diplopia with disconjugate gaze involving the left eye. 2. 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 3. Possible bacterial meningitis with negative cultures, but positive  chemical findings in the cerebrospinal fluid. 4. Neurosarcoidosis, reasonably long-standing. According to the  mother, the patient has been at home and noncompliant with his  treatment. 5. History of urinary incontinence and urinary retention with intermittent  self catheterization. 6. Left hemiparesis noted in 10/2016 thought also to be related to  neurosarcoid. DISCHARGE MEDICATIONS: Include  1. Prednisone 10 mg a day to start on 2017.  2. Flomax 0.4 mg a day. 3. Bupropion  mg in the morning. 4. Dexamethasone 4 mg twice a day through the . 5. Tylenol No. 3, 1 every 4 hours p.r.n. pain. 6. Imuran 50 mg a day. COURSE IN THE FACILITY: The patient was admitted from the  hospital following fever, chills, new onset diplopia and an abnormal  cerebrospinal fluid. His steroid treatments were increased to Decadron  and the Decadron is being tapered back to his regular dose of  prednisone. According to the mother, he was not taking his medications  at home and he has been restarted on his Imuran. He finished his  antibiotics, which was Rocephin, while he was here, although the CSF  cultures failed to demonstrate an offending organism. His headaches  resolved. His temperature remained normal. Laboratory data shows  hemoglobin to 11.9, white count was 9.9, blood sugars were well  controlled. At this point, we will continue the patient on the above-  mentioned medication. He is to follow up with Dr. Mirella Quinn, as well as with  Neurology within 1-2 weeks.         Juanita Severance, MD    [facility] BAEZ: 03/17/2017   10:28  T:  03/17/2017   10:49  Job #:  [id number]

## 2017-03-17 NOTE — PROGRESS NOTES
Pt and his mother spoke with [facility] adult home provider re: her home. Pt is against placement. SW stressed the importance of pt complying with request by his mother to avoid snf placement. Pt seemed agreeable to this and shook his head.

## 2017-03-17 NOTE — PROGRESS NOTES
Problem: Self Care Deficits Care Plan (Adult)  Goal: *Acute Goals and Plan of Care (Insert Text)  OCCUPATIONAL THERAPY SHORT TERM GOALS     Updated 3/16/17    1. Patient will perform Upper body ADLs with/without adaptive equipment with Mod I.  2. Patient will perform Lower body ADLs with/without adaptive equipment with SBA. 3. Patient will perform toileting task with Supervision with Fair+ safety to reduce falls risk. 4. Patient will perform functional transfers with [address] and Supervision. 5. Patient will perform standing static/dynamic balance activities for improved ADL/IADL function with Supervision and Good balance and safety awarenes. 6. Patient will improve Barthel index scores to atleast 80/100 to improve functional mobility. Initiated 3/10/2017 and to be accomplished within 2 Week(s)    1. Patient will perform Upper body ADLs with/without adaptive equipment with supervision/set-up. (goal met-UPG Mod I)  2. Patient will perform Lower body ADLs with/without adaptive equipment with minimal assistance/contact guard assist . (goal met-UPG SBA)  3. Patient will perform toileting task with minimal assistance/contact guard assist with Fair safety to reduce falls risk. (goal met-UPG Supervision)  4. Patient will perform functional transfers with [address] and minimal assistance/contact guard assist. (goal met-UPG Supervision)  5. Patient will perform standing static/dynamic balance activities for improved ADL/IADL function with minimal assistance/contact guard assist and Fair balance and safety awarenes. (goal met-UPG Supervision)  6. Patient will improve Barthel index scores to atleast 60/100 to improve functional mobility. (goal met-UPG 80/100)    OCCUPATIONAL THERAPY LONG TERM GOALS   Initiated 3/10/2017 and to be accomplished within 3-4 Week(s)    1. Patient will perform Upper body ADLs with/without adaptive equipment with modified independence.   2. Patient will perform Lower body ADLs with/without adaptive equipment with modified independence. 3. Patient will perform toileting task with modified independence with Fair safety to reduce falls risk. 4. Patient will perform functional transfers with Rolling Walker and supervision/set-up and Fair balance and safety awareness. 5. Patient will perform standing static/dynamic activity for improved ADL/IADL function with modified independence an and Fair balance and safety awareness. 6. Patient will improve Barthel index score to 80/100 to improve independence with mobility. Therapist: ADAIR Puente 3/10/2017   [address] [facility]   OCCUPATIONAL THERAPY DAILY TREATMENT NOTE        Patient: Maxx Gómez  (40 y.o. male)                            Date: 3/17/2017  Attending Physician: Claudean Peaches, MD  Primary Diagnosis: neurosarcoidosis    Treatment Diagnosis  Treatment Diagnosis: neurosarcoidosis    Precautions : Precautions at Admission: Fall, WBAT  Vital Signs:        Cognitive Status:     Pain:        Gross Assessment:     Coordination:     Bed Mobility:  Bed Mobility  Supine to Sit: Supervision  Scooting: Supervision  Transfers:  Functional Transfers  Sit to Stand: Supervision  Stand to Sit: Supervision  Shower Transfer: Supervision  Functional Transfers  Shower Transfer: Supervision  Cues: Verbal cues provided  Balance:  Balance  Sitting: Without support  Sitting - Static: Good (unsupported)  Sitting - Dynamic: Fair (occasional) (+)  Standing: With support  Standing - Static: Good  Standing - Dynamic : Fair  ADL Self Care:     ADL Intervention:  Upper Body Bathing  Bathing Assistance: Minimum assistance  Position Performed: Seated edge of bed;Standing  Upper Body Dressing Assistance  Dressing Assistance: Modified independent  Pullover Shirt: Modified independent  Lower Body Bathing  Bathing Assistance: Supervision/set-up  Perineal  : Supervision/set-up  Position Performed: Standing  Lower Body : Supervision/set-up  Position Performed: Seated in chair        Lower Body Dressing Assistance  Dressing Assistance: Supervision/set up  Underpants: Supervision/set-up  Socks: Supervision/set-up  Leg Crossed Method Used: No  Position Performed: Bending forward method;Seated in chair           Therapeutic Activities:  Completed shower assessment with patient and his mother in order to assess safety and independence as well as educate patient's mother on amount of assistance needed for routine. PHAM informed patient's mother he able to complete all physical assistance just supervision for transfers and standing due to occasional decreased safety awareness.   Patient/Caregiver Education:    PtMary Ordoñez Education on see above  ASSESSMENT:  Patient continues to demonstrate the need for skilled Occupational Therapy services to improve dynamic standing balance needed for bathing  Progression toward goals:  [X]      Improving appropriately and progressing toward goals  [ ]      Improving slowly and progressing toward goals  [ ]      Not making progress toward goals and plan of care will be adjusted      Treatment session:   60 minutes     Therapist:    Janae Leung, [address],  3/17/2017

## 2017-03-18 NOTE — ROUTINE PROCESS
Bedside and Verbal shift change report given to Jorge L Christina LPN (oncoming nurse) by Chuck Valedz RN (offgoing nurse). Report included the following information SBAR, Kardex and MAR.  24 hour chart check completed

## 2017-03-19 NOTE — ROUTINE PROCESS
Bedside and verbal shift report given to P. Lara Schwab, LPN (oncoming nurse) by MAYURI Ruiz LPN (off going nurse). Report included SBAR, MAR and Kardex.  Hourly rounds completed

## 2017-03-19 NOTE — ROUTINE PROCESS
Resident remains alert, PICC line removes from right upper arm. No bleeding observed Pressure dressing applied Discharge instructions given to Mother.

## 2017-03-20 NOTE — PROGRESS NOTES
Transition of [facility] Follow Up for [facility] from 3/2/17 to 3/9/17 for Meningitis then transferred to [facility] from 3/9/17 - 3/19/17. Called patient 3/20/2017 and 3/21/17, left voice mails for patient to return my call twice. Mailed patient a letter.

## 2017-03-20 NOTE — PROGRESS NOTES
[facility]ational [facility] Discharge note    Patient: Alexa Mims (40 y.o. male)  Date: 3/20/2017    Primary Diagnosis: neurosarcoidosis      Treatment Diagnosis  Treatment Diagnosis: neurosarcoidosis   Precautions: Fall, WBAT  Physician: No att. providers found      Alexa Mims was discharged from the facility on 3/19/17 unexpectedly and was unable to receive a final Skilled Occupational Therapy Evaluation due to the following reason:   [] Medical Emergency or Procedure requiring patient to be hospitalized   [x]Per Insurance   []     Please see [facility] Physical Therapy Treatment Note dated : 3/18/17 for the patients level of progress/independence prior to discharge. Therapist Alan Christine,  (enter credentials here)   3/20/2017  Please forward to OT for Co-signature.

## 2017-03-20 NOTE — PROGRESS NOTES
Problem: Mobility Impaired (Adult and Pediatric)  Goal: *Therapy Goal (Edit Goal, Insert Text)  PHYSICAL THERAPY STG GOALS :  Initiated 3/11/2017 and to be accomplished within 1 Weeks (Updated 3/16/17)     1. Patient will transfer from bed to chair and chair to bed with modified independence using FWW. (Progressing; CGA)   2. Patient will perform sit to stand with modified independence with Good balance and safety awareness. (Progressing; ((S) with occasional cues for safety awareness and good balance)   3. Patient will ambulate with modified independence for 200 feet with FWW on level surfaces with 2 turns. (Progessing; 250ft with 4 turns and SBA/CGA)   4. Patient will ascend/descend 2 stairs with no handrail(s) with supervision/set-up to allow for safe home access/exit. (Progressing; 5 steps with R HR and CGA with cues for safety awareness)   5. Patient will improve standardized test score for [facility] Balance Scale to 5 to improve stability during manipulation of the home environment. (Progressing; 3+)     Physical Therapist: Izabela Ariza on 3/11/2017        [facility] PHYSICAL THERAPY DISCHARGE SUMMARY        Patient: Mary Ellen Gudino (40 y.o. male)                  Date: 3/20/2017    Attending Physician: No att. providers found  Primary Diagnosis: neurosarcoidosis        Treatment Diagnosis  Treatment Diagnosis: neurosarcoidosis          Precautions: Fall, WBAT   MEDICAL HISTORY:   Past Medical History:   Diagnosis Date    Arm pain      [facility]     Meningitis      Meningitis      [facility]sarcoidosis ([facility]      Past Surgical History:   Procedure Laterality Date    HX HIP REPLACEMENT       HX WISDOM TEETH EXTRACTION            Reason for Discharge: [ ] Goals Met   [X]Met highest potential  [ ]    [ ] Progress ceased  [ ]Hospitalized  [ Alarcon Clore: Pt/family request for early D/C from facility.   Discharge Location:  [X] Private Residence  [ ] residential  [ ] LTC  [ ]  Sen[facility]. [X]Other: 24 hr.supervision for safety. Summarize skilled services provided and significant progress attained since last daily/weekly note (include individualized treatment techniques and standardized tests): This Patient has received skilled PT services from 3/11/17 through 3/20/17 and has made Good progress towards their PT goals.     Improvements noted in bed mobility and transfers   Summary of education/recommendation provided to Patient/Caregivers:    Pt. Education provided to use Rolling Walker        Objective Data:       INITIAL  ASSESSMENT DISCHARGE ASSESSMENT   COGNITIVE STATUS COGNITIVE STATUS   Neurologic State: Alert  Orientation Level: Oriented X4  Cognition: Appropriate for age attention/concentration, Appropriate safety awareness, Follows commands  Perception: Cues to maintain midline in sitting, Cues to maintain midline in standing, Tactile, Verbal  Perseveration: No perseveration noted  Safety/Judgement: Fall prevention Neurologic State: Alert  Orientation Level: Oriented X4  Cognition: Follows commands  Perception: Cues to maintain midline in sitting, Cues to maintain midline in standing, Tactile, Verbal  Perseveration: No perseveration noted  Safety/Judgement: Fall prevention   PAIN PAIN   Pain Scale 1: Numeric (0 - 10)  Pain Intensity 1: 0  Pain Location 1: Arm  Pain Orientation 1: Left  Patient Stated Pain Goal: 0  Pain Reassessment 1: Yes Pain Scale 1: Numeric (0 - 10)  Pain Intensity 1: 0  Pain Location 1: Arm  Pain Orientation 1: Left  Patient Stated Pain Goal: 0  Pain Reassessment 1: Yes   GROSS ASSESSMENT GROSS ASSESSMENT   AROM: Generally decreased, functional  PROM: Generally decreased, functional  Strength: Generally decreased, functional  Coordination: Generally decreased, functional  Tone:  (chronic flexor tone at L hand from previous CVA)  Sensation: Impaired AROM: Generally decreased, functional  PROM: Generally decreased, functional  Strength: Generally decreased, functional  Coordination: Generally decreased, functional  Tone: Abnormal  Sensation: Impaired   BED MOBILITY BED MOBILITY   Supine to Sit: Contact guard assistance  Sit to Supine: Minimum assistance  Scooting: Contact guard assistance Supine to Sit: Supervision  Sit to Supine: Supervision  Scooting: Supervision   GAIT GAIT   Base of Support: Shift to left, Shift to right, Other (comment) (variable lean, leans to L, LOB then leans to R)  Speed/Kami: Pace decreased (<100 feet/min), Slow  Step Length: Left shortened, Right shortened  Swing Pattern: Left asymmetrical, Right asymmetrical  Stance: Left decreased, Right increased, Weight shift  Gait Abnormalities: Decreased step clearance, Hemiplegic, Path deviations, Trunk sway increased, Other (leans to L, LOB when loss of , then Lean to R)  Ambulation - Level of Assistance: Contact guard assistance  Distance (ft): 185 Feet (ft) (133 & 100 feet for f/u rounds of walking)  Assistive Device: Walker, rolling, Other (comment) (trained with quad cane for last 100 feet of ambulation today)  Rail Use: Right   Stairs - Level of Assistance: Contact guard assistance  Number of Stairs Trained: 5 (x3)  Interventions: Verbal cues, Visual/Demos Base of Support: Center of gravity altered  Speed/Kami: Slow  Step Length: Right shortened, Left shortened  Swing Pattern: Left asymmetrical, Right asymmetrical  Stance: Left decreased, Right increased, Weight shift  Gait Abnormalities: Decreased step clearance, Path deviations, Trunk sway increased  Ambulation - Level of Assistance: Contact guard assistance, Stand-by asssistance  Distance (ft): 250 Feet (ft)  Assistive Device: Walker, rolling, Gait belt  Rail Use: Left   Stairs - Level of Assistance: Contact guard assistance, Stand-by asssistance  Number of Stairs Trained: 15  Interventions: Safety awareness training                       With 4 turns.    TRANSFERS TRANSFERS   Sit to Stand: Minimum assistance, Additional time, Assist x1  Stand to Sit: Contact guard assistance, Additional time, Assist x1  Bed to Chair: Contact guard assistance Sit to Stand: Supervision  Stand to Sit: Supervision  Bed to Chair: Contact guard assistance   BALANCE BALANCE   Sitting: Impaired  Sitting - Static: Fair (occasional) (+)  Sitting - Dynamic: Fair (occasional)  Standing: Pull to stand, With support  Standing - Static: Fair (-)  Standing - Dynamic : Poor (+) Sitting: Without support  Sitting - Static: Good (unsupported)  Sitting - Dynamic: Fair (occasional) (+)  Standing: With support  Standing - Static: Good  Standing - Dynamic : Fair   WHEELCHAIR MOBILITY/MGMT WHEELCHAIR MOBILITY/MGMT               Visual/Perceptual       Diplopia: Yes  Acuity: Able to read employee name badge without difficulty  Corrective Lenses:  (eye patch for L eye)    Auditory:   Auditory  Auditory Impairment: None             Visual/Perceptual       Diplopia: Yes  Acuity: Able to read employee name badge without difficulty  Corrective Lenses:  (eye patch for L eye)    Auditory:   Auditory  Auditory Impairment: None             Activity Tolerance:  Fair                              Treatment:   No treatment rendered today. Discharge Recommendations:  [ ]  Home Exercise Program                                     [X]  Home Health PT             [X]  Remove throw rugs/clear environmental barriers                  [X]  Assistance with: ambulation and stair negotiation                             [X]  Ambulation Device: Rolling Walker   [X]  Steps with right & CGA  [X]  Wheelchair Pt has w/c at home   [ ]  Life Line/alert   [ ]  WC  Ramp        Treatment minutes: 0 minutes.      Therapist :  Pam Schuler PTA            Date:3/20/2017

## 2017-03-20 NOTE — LETTER
3/21/2017 4:49 PM 
 
Mr. Ubaldo Nevarez. [address] 
[facility] Dear Ubaldo Nevarez., 
 
I am a Nurse Navigator covering for 's patients and part of my work is to follow up with patients who were admitted to the Hospital.  I have been unable to reach you by phone. I would like to see how you are doing. If you could call me at your earliest convenience. I can be reached @ [phone].  
 
 
 
 
 
 
Sincerely, 
 
 
Niko Perales RN

## 2017-03-22 NOTE — PROGRESS NOTES
Transitional Care Follow up         Returned [facility] voice call, patient's mother, on his PHI, currently at work. Patient is doing fair, not walking as good as she would like. Leaving work early to meet [address] this afternoon, manages his medications and purchased Rxs after discharge. She will call back this afternoon to complete medication reconciliation. Patient has RL FU 3/27/17 @ 1:30pm, she is aware of his other scheduled appointments.

## 2017-03-27 NOTE — PROGRESS NOTES
1. Have you been to the ER, urgent care clinic since your last visit? Hospitalized since your last visit? No    2. Have you seen or consulted any other health care providers outside of the [address] [place] [address] since your last visit? Include any pap smears or colon screening.  No

## 2017-03-27 NOTE — PROGRESS NOTES
Raffi Rodas is a 39 y.o.  male and presents with      Subjective:  Constipation  Patient complains of constipation. Stool pattern has been 1 {stool hard stool(s) every 2 weeks. Onset was a few months ago Defecation has been difficult. Co-Morbid conditions:neurologic disease. Symptoms have been symptoms have progressed to a point and plateaued. . Current Health Habits: Eating fiber? no Exercise?no Water intake? inadequate Current OTC/RX therapy has been none. Patient was recently hospitalized for possible meningitis which was then attributed to neurosarcoidosis after further examination of CSF. MRI was performed and it has been reviewed. Patient was referred to ophthalmology but the physician did not take his insurance. He continues to have diplopia decreased eye movements. He denies any difficulties. He still does not talk significantly. His mother is answering for him. Patient Active Problem List   Diagnosis Code    Neurosarcoidosis ([facility] D86.89    Left hemiparesis ([facility] G81.94    Advance care planning Z71.89    Meningitis G03.9    Headache R51     Patient Active Problem List    Diagnosis Date Noted    Headache 03/03/2017    Meningitis 03/02/2017    Advance care planning 11/17/2016    Neurosarcoidosis ([facility] 10/17/2016    Left hemiparesis ([facility] 10/17/2016     Current Outpatient Prescriptions   Medication Sig Dispense Refill    azaTHIOprine (IMURAN) 50 mg tablet Take 1 Tab by mouth daily. 60 Tab 0    buPROPion XL (WELLBUTRIN XL) 300 mg XL tablet Take 1 Tab by mouth every morning. 60 Tab 0    predniSONE (DELTASONE) 10 mg tablet Take 1 Tab by mouth daily (with breakfast). 100 Tab 0    tamsulosin (FLOMAX) 0.4 mg capsule Take 1 Cap by mouth daily.  60 Cap 0     No Known Allergies  Past Medical History:   Diagnosis Date    Arm pain     CVA (cerebrovascular accident) ([facility] 2012    Meningitis     Meningitis     Neurosarcoidosis ([facility] 2012     Past Surgical History: Procedure Laterality Date    HX HIP REPLACEMENT  2010    HX WISDOM TEETH EXTRACTION       History reviewed. No pertinent family history. Social History   Substance Use Topics    Smoking status: Current Some Day Smoker     Packs/day: 0.25    Smokeless tobacco: Never Used    Alcohol use No       ROS   General ROS: negative  Psychological ROS: positive for - depression  ENT ROS: negative  Hematological and Lymphatic ROS: negative  Endocrine ROS: negative  Respiratory ROS: no cough, shortness of breath, or wheezing  Cardiovascular ROS: no chest pain or dyspnea on exertion  Gastrointestinal ROS: no abdominal pain, change in bowel habits, or black or bloody stools  Genito-Urinary ROS: no dysuria, trouble voiding, or hematuria  Musculoskeletal ROS: positive for - gait disturbance, muscular weakness and pain in arm - left and hand - left  Neurological ROS: positive for - impaired coordination/balance and weakness  Dermatological ROS: negative    All other systems reviewed and are negative. Objective:  Vitals:    03/27/17 1338   BP: 108/67   Pulse: (!) 108   Resp: 18   Temp: 98.4 °F (36.9 °C)   TempSrc: Oral   SpO2: 94%   Weight: 181 lb 3.2 oz (82.2 kg)   Height: 6' 2\" (1.88 m)   PainSc:   0 - No pain       alert, well appearing, and in no distress, oriented to person, place, and time and normal appearing weight  Mental status - alert, oriented to person, place  Chest - clear to auscultation, no wheezes, rales or rhonchi, symmetric air entry  Heart - normal rate, regular rhythm, normal S1, S2, no murmurs, rubs, clicks or gallops  Neurological - II, III, IV, VI: Visual acuity grossly intact. Visual fields are normal.   Pupils are equal, round, and reactive to light and accommodation. Extra-ocular movements are decreased to left and up reports diplopia, no ptosis or nystagmus. V-XII: Hearing is grossly intact.  Facial features are symmetric,  Motor Examination: Normal tone, bulk, and strength, 5/5 muscle strength throughout right side Consistent with effort On right and lle decreased lue . No cogwheel rigidity or clonus present. Contracture left hand unable to extend fingers left hand  Extremities - peripheral pulses normal, no pedal edema, no clubbing or cyanosis  Skin - normal coloration and turgor, no rashes, no suspicious skin lesions noted    LABS     TESTS  MRI brain  Impression:   1. Persistent signal abnormality left thalamus and midbrain slightly improved  along the periphery with slight improvement in degree of midbrain expansion,  with resolution of previous enhancement. Differential diagnosis continues to  include site of involvement of neurosarcoidosis or demyelinating process. Infectious etiology of cerebritis not entirely excluded. Given improvement in  previous enhancement, neoplasm less likely.     2. Stable areas of chronic signal changes right thalamus and tom which may  represent sequela of previous chronic neurosarcoidosis or areas of chronic  infarct or demyelination.     3. No evidence of new acute intracranial finding or other significant change. Assessment/Plan:    1. Diplopia  Last physician did not take insurance; appointment scheduled for 3/31/2017  - REFERRAL TO OPHTHALMOLOGY    2. Neurosarcoidosis ([facility]  F/u with neurology    3. Left hemiparesis ([facility]  unchanged    4. Slow transit constipation  Start miralax for daily use until stools are soft then space dose to maintain appropriate stool consistency  - polyethylene glycol (MIRALAX) 17 gram packet; Take 1 Packet by mouth daily. Dispense: 30 Each; Refill: 1      Lab review: labs reviewed, I note that cultures were negative for CSF      I have discussed the diagnosis with the patient and the intended plan as seen in the above orders. The patient has received an after-visit summary and questions were answered concerning future plans. I have discussed medication side effects and warnings with the patient as well.  I have reviewed the plan of care with the patient, accepted their input and they are in agreement with the treatment goals. Follow-up Disposition:  Return in about 1 month (around 4/27/2017) for medication evaluation.

## 2017-03-27 NOTE — MR AVS SNAPSHOT
Visit Information Date & Time Provider Department Dept. Phone Encounter #  
 3/27/2017  1:30 PM Nicole Anders, [address] [id number] Follow-up Instructions Return in about 1 month (around 4/27/2017) for medication evaluation. Your Appointments 4/3/2017  8:40 AM  
Follow Up with Maine Campbell MD  
[facility]) Appt Note: BOTOX INJ; 200 units and under EMG guidance. [address] [facility] [id number] [phone]  
  
   
 [facility] [id number]  
  
    
 5/26/2017  8:45 AM  
Follow Up with Ameena Wallace MD  
[facility]) Appt Note: 2 month fu  
 [address] [facility] [address] [facility] [address] [phone]  
  
   
 [place] [address]  
  
    
 11/20/2017  2:30 PM  
Office Visit with Nicole Anders MD  
[address] [address]) Appt Note: Return in about 1 year (around 11/17/2017) for medicare wellness exam.  
 [address] [facility] [address] [facility]  
  
   
 [address] Upcoming Health Maintenance Date Due DTaP/Tdap/Td series (1 - Tdap) 9/13/2000 Allergies as of 3/27/2017  Review Complete On: 3/27/2017 By: Nicole Anders MD  
 No Known Allergies Current Immunizations  Reviewed on 3/3/2017 Name Date Influenza Vaccine 10/12/2016 Pneumococcal Polysaccharide (PPSV-23) 11/17/2016  3:27 PM  
  
 Not reviewed this visit You Were Diagnosed With   
  
 Codes Comments Diplopia    -  Primary ICD-10-CM: H53.2 ICD-9-CM: 368.2 Neurosarcoidosis ([facility]     ICD-10-CM: H66.81 
ICD-9-CM: 093 Left hemiparesis ([facility]     ICD-10-CM: G81.94 
ICD-9-CM: 342.90 Slow transit constipation     ICD-10-CM: K59.01 
ICD-9-CM: 564.01 Vitals BP Pulse Temp Resp Height(growth percentile) Weight(growth percentile) 108/67 (BP 1 Location: Right arm, BP Patient Position: Sitting) (!) 108 98.4 °F (36.9 °C) (Oral) 18 6' 2\" (1.88 m) 181 lb 3.2 oz (82.2 kg) SpO2 BMI Smoking Status 94% 23.26 kg/m2 Current Some Day Smoker Vitals History BMI and BSA Data Body Mass Index Body Surface Area  
 23.26 kg/m 2 2.07 m 2 Preferred Pharmacy Pharmacy Name Phone [facility] [address], [address] [place] [address] [phone] Your Updated Medication List  
  
   
This list is accurate as of: 3/27/17  2:05 PM.  Always use your most recent med list.  
  
  
  
  
 azaTHIOprine 50 mg tablet Commonly known as:  [employer] Take 1 Tab by mouth daily. buPROPion  mg XL tablet Commonly known as:  [place] Take 1 Tab by mouth every morning. polyethylene glycol 17 gram packet Commonly known as:  [place] [employer] Take 1 Packet by mouth daily. predniSONE 10 mg tablet Commonly known as:  [place] Take 1 Tab by mouth daily (with breakfast). tamsulosin 0.4 mg capsule Commonly known as:  FLOMAX Take 1 Cap by mouth daily. Prescriptions Sent to Pharmacy Refills  
 polyethylene glycol (MIRALAX) 17 gram packet 1 Sig: Take 1 Packet by mouth daily. Class: Normal  
 Pharmacy: [facility] [place] [address], [address] [place] [address] #: [phone] Route: Oral  
  
We Performed the Following REFERRAL TO OPHTHALMOLOGY [REF57 Custom] Comments:  
 Please evaluate 38 y/o male patient for h/o neurosarcoidosis and now with diplopia X 3 weeks. Follow-up Instructions Return in about 1 month (around 4/27/2017) for medication evaluation. To-Do List   
 03/27/2017 To Be Determined Appointment with Riccardo Moritz, OT at [address] [facility]  
  
 03/29/2017 To Be Determined Appointment with Ace Schlatter, PTA at [address] [facility]  
  
 03/29/2017 To Be Determined Appointment with Car Cochran OT at [address] [facility]  
  
 03/30/2017 To Be Determined Appointment with Ace Schlatter, PTA at [address] [facility]  
  
 04/03/2017 To Be Determined Appointment with Ace Schlatter, PTA at [address] [facility] SCHEDULING/INTAKE  
  
 04/05/2017 To Be Determined Appointment with Ace Schlatter, PTA at [address] [facility] SCHEDULING/INTAKE  
  
 04/07/2017 To Be Determined Appointment with Ace Schlatter, PTA at [facility]  Referral Information Referral ID Referred By Referred To  
  
 [id number] ROHAN, [facility], MD Briana Saeed [address] [place] [address], [facility] [address] Phone: [phone] Visits Status Start Date End Date 1 New Request 3/27/17 3/27/18 If your referral has a status of pending review or denied, additional information will be sent to support the outcome of this decision. Introducing \[facility] [place] [facility]\"" & HEALTH SERVICES! Dear Juan Lepe: Thank you for requesting a [employer] account. Our records indicate that you already have an active [employer] account. You can access your account anytime at [email or web address]. Powered by [email or web address] Did you know that you can access your hospital and ER discharge instructions at any time in [employer]? You can also review all of your test results from your hospital stay or ER visit. Additional Information If you have questions, please visit the Frequently Asked Questions section of the [employer] website at [email or web address]. Powered by [employer]/. Remember, [employer] is NOT to be used for urgent needs. For medical emergencies, dial 911. Now available from your iPhone and Android! Please provide this summary of care documentation to your next provider. Your primary care clinician is listed as Rosi Bonner. If you have any questions after today's visit, please call [phone].

## 2017-03-27 NOTE — MR AVS SNAPSHOT
Visit Information Date & Time Provider Department Dept. Phone Encounter #  
 3/27/2017  8:15 AM Preeti Duran MD [address]  Follow-up Instructions Return in about 2 months (around 5/27/2017). Follow-up and Disposition History Your Appointments 3/27/2017  1:30 PM  
HOSPITAL FOLLOW-UP with Ntihya Akers MD  
[address] [address]) Appt Note: Post hosp follow up, to be discharged from [facility] on 3/18/17, neuro sarcoidosis [id number] [facility] [address] [facility] [address]  
  
   
 [id number] [place] [address] [address]  
  
    
 4/3/2017  8:40 AM  
Follow Up with Shan Reese MD  
[address]) Appt Note: BOTOX INJ; 200 units and under EMG guidance. [facility] [address]  
[phone]  
  
   
 [place] [address]  
  
    
 5/26/2017  8:45 AM  
Follow Up with Preeti Duran MD  
[address]) Appt Note: 2 month fu  
 [address] [facility] [address] [facility] [address] [phone]  
  
   
 [place] [address]  
  
    
 11/20/2017  2:30 PM  
Office Visit with Nithya Akers MD  
[address] [address]) Appt Note: Return in about 1 year (around 11/17/2017) for medicare wellness exam.  
 [address] [facility] [address] [facility] [address]  
  
   
 [id number] [place] [address] [address] Upcoming Health Maintenance Date Due DTaP/Tdap/Td series (1 - Tdap) 9/13/2000 Allergies as of 3/27/2017  Review Complete On: 3/27/2017 By: Preeti Duran MD  
 No Known Allergies Current Immunizations  Reviewed on 3/3/2017 Name Date Influenza Vaccine 10/12/2016 Pneumococcal Polysaccharide (PPSV-23) 11/17/2016  3:27 PM  
  
 Not reviewed this visit You Were Diagnosed With   
  
 Codes Comments Spastic hemiplegia ([facility]    -  Primary ICD-10-CM: G81.10 ICD-9-CM: 342.10 Muscle spasticity     ICD-10-CM: S10.915 ICD-9-CM: 728.85 Left hemiparesis ([facility].)     ICD-10-CM: G81.94 
ICD-9-CM: 342.90 Neurosarcoidosis ([facility].)     ICD-10-CM: P52.41 
ICD-9-CM: 278 Contracture, left hand     ICD-10-CM: M24.542 ICD-9-CM: 718.44 Vitals BP Pulse Temp Resp Height(growth percentile) Weight(growth percentile) 108/64 (BP 1 Location: Right arm, BP Patient Position: Sitting) (!) 103 98.6 °F (37 °C) (Oral) 14 6' 2\" (1.88 m) 179 lb (81.2 kg) SpO2 BMI Smoking Status 96% 22.98 kg/m2 Current Some Day Smoker Vitals History BMI and BSA Data Body Mass Index Body Surface Area  
 22.98 kg/m 2 2.06 m 2 Preferred Pharmacy Pharmacy Name Phone [facility] [address] [phone] Your Updated Medication List  
  
   
This list is accurate as of: 3/27/17  9:07 AM.  Always use your most recent med list.  
  
  
  
  
 acetaminophen-codeine 300-30 mg per tablet Commonly known as:  TYLENOL #3 Take 1 Tab by mouth every four (4) hours as needed for Pain. Max Daily Amount: 6 Tabs. azaTHIOprine 50 mg tablet Commonly known as:  The Pepsi Take 1 Tab by mouth daily. buPROPion  mg XL tablet Commonly known as:  Oneda Butt Take 1 Tab by mouth every morning. predniSONE 10 mg tablet Commonly known as:  Nanine Knife Take 1 Tab by mouth daily (with breakfast). tamsulosin 0.4 mg capsule Commonly known as:  FLOMAX Take 1 Cap by mouth daily. Follow-up Instructions Return in about 2 months (around 5/27/2017). To-Do List   
 03/29/2017 To Be Determined Appointment with Marleny Savage PTA at [facility] SCHEDULING/INTAKE  
  
 03/30/2017 To Be Determined Appointment with Tessa Muñiz PTA at [address] [facility]  
  
 04/03/2017 To Be Determined Appointment with Tessa Muñiz PTA at [address] [facility] SCHEDULING/INTAKE  
  
 04/05/2017 To Be Determined Appointment with Tessa Muñiz PTA at [address] [facility] SCHEDULING/INTAKE  
  
 04/07/2017 To Be Determined Appointment with Tessa Muñiz PTA at [address] [facility] & HEALTH SERVICES! Dear Guillermo Schneider: Thank you for requesting a [email or web address] account. Our records indicate that you already have an active [email or web address] account. You can access your account anytime at [email or web address] Did you know that you can access your hospital and ER discharge instructions at any time in [email or web address]? You can also review all of your test results from your hospital stay or ER visit. Additional Information If you have questions, please visit the Frequently Asked Questions section of the [email or web address] website at [email or web address] Remember, [email or web address] is NOT to be used for urgent needs. For medical emergencies, dial 911. Now available from your iPhone and Android! Please provide this summary of care documentation to your next provider. Your primary care clinician is listed as Jennifer Talavera. If you have any questions after today's visit, please call [phone].

## 2017-03-27 NOTE — PROGRESS NOTES
[facility]             [address] [place] [address] [address], [address]    3/27/2017    HPI:  Maxx Rivera. is a 39 y.o., right handed,   male, who presents with left hand pain. Patient is accompanied by mother who provides much of the history. Old records did not accompany the patient according to mother he had bacterial meningitis in 2011 he had a hip replacement 814 due to a fall and fracture which is attributed to use of steroids with the meningitis. Subsequently he developed stroke with left-sided weakness he fell again and broke left arm following casting he developed contractures of the left hand he describes the hand pain as achy. Mother reports that the Neurontin seem to help the pain but made him walk drunken is somewhat drowsy. She has not continued the medicine. She states that the Dr. Henrry Palm and Marah Allen diagnosed neurosarcoidosis. She he has been followed by neurology at [place] [address] is not taking his medications including antidepressants he denies depression but his mother feels that he is but he does not like to take medicine he does smoke a pack half pack per day  Patient denies any pain or significant improvement following the first Botox injection he is due for reassessment in a month by Dr. Sandra Elias he has undergone neuropsychological testing but this was only a week ago. Results are not available for review records from [place] [address] are also not available. Mother wants a repeat MRI brain Patient only tried one dose neurontin but denies pain    Reviewed records from [facility] will still need actual copies of studies of MRI of the radiology can compare patient notes no change with Botox. He is seeing orthopedic surgeon for tendon release. Surgeon wishes release to ok tendon release per mother.       Patient was recently hospitalized for possible meningitis which was then attributed to neurosarcoidosis after further examination of CSF. MRI was performed and it has been reviewed. Patient is to follow-up with ophthalmologist since he developed diplopia decreased eye movements. He denies any difficulties. He still does not talk significantly      Current Outpatient Prescriptions   Medication Sig Dispense Refill    acetaminophen-codeine (TYLENOL #3) 300-30 mg per tablet Take 1 Tab by mouth every four (4) hours as needed for Pain. Max Daily Amount: 6 Tabs. 60 Tab 0    azaTHIOprine (IMURAN) 50 mg tablet Take 1 Tab by mouth daily. 60 Tab 0    buPROPion XL (WELLBUTRIN XL) 300 mg XL tablet Take 1 Tab by mouth every morning. 60 Tab 0    predniSONE (DELTASONE) 10 mg tablet Take 1 Tab by mouth daily (with breakfast). 100 Tab 0    tamsulosin (FLOMAX) 0.4 mg capsule Take 1 Cap by mouth daily. 61 Cap 0       Past Medical History:   Diagnosis Date    Arm pain     CVA [facility]cerebrovascular accident) ([facility] 2012    Meningitis     Meningitis     Neurosarcoidosis ([facility] 2012       Past Surgical History:   Procedure Laterality Date    HX HIP REPLACEMENT  2010    HX WISDOM TEETH EXTRACTION       No family history on file.   No Known Allergies    Review of Systems:   Review of Systems - History obtained from mother and the patient  General ROS: negative  Psychological ROS: positive for - depression  ENT ROS: negative  Hematological and Lymphatic ROS: negative  Endocrine ROS: negative  Respiratory ROS: no cough, shortness of breath, or wheezing  Cardiovascular ROS: no chest pain or dyspnea on exertion  Gastrointestinal ROS: no abdominal pain, change in bowel habits, or black or bloody stools  Genito-Urinary ROS: no dysuria, trouble voiding, or hematuria  Musculoskeletal ROS: positive for - gait disturbance, muscular weakness and pain in arm - left and hand - left  Neurological ROS: positive for - impaired coordination/balance and weakness  Dermatological ROS: negative    PHYSICAL EXAMINATION:    Visit Vitals    /64 (BP 1 Location: Right arm, BP Patient Position: Sitting)    Pulse (!) 103    Temp 98.6 °F (37 °C) (Oral)    Resp 14    Ht 6' 2\" (1.88 m)    Wt 81.2 kg (179 lb)    SpO2 96%    BMI 22.98 kg/m2     General:  Well defined, nourished, and groomed individual in no acute distress. Neck: Supple, nontender, thyroid within normal limits, no JVD, no bruits, no pain with resistance to active range of motion. Heart: Regular rate and rhythm, no murmurs, rub, or gallop. Normal S1S2. Lungs:  Clear to auscultation bilaterally with equal chest expansion, no cough, no wheeze  Musculoskeletal:  Extremities revealed no edema and had full range of motion of joints except left hand. NEUROLOGICAL EXAMINATION:     Mental Status:   Alert and oriented to person, place,not time unable to assess memory  Attention span and concentration are normal. Speech is sparse with a fair fund of knowledge. Patient appears abulic     Cranial Nerves:    II, III, IV, VI:  Visual acuity grossly intact. Visual fields are normal.    Pupils are equal, round, and reactive to light and accommodation. Extra-ocular movements are decreased to left and up reports diplopia, no ptosis or nystagmus. V-XII: Hearing is grossly intact. Facial features are symmetric,  Motor Examination: Normal tone, bulk, and strength, 5/5 muscle strength throughout right side  Consistent with effort  On right and lle decreased lue . No cogwheel rigidity or clonus present. Contracture left hand unable to extend fingers left hand    Coordination:  Heel-to-shin slow bilaterally. Finger to nose and rapid arm movement testing was better done on right . No resting or intention tremor    Gait and Station:left hemiparetic gait    Left pronator drift. Left hand atrophy  FINDINGS:       Diffusion:  There are no areas of restricted diffusion, no evidence of an acute  infarction. Brain parenchyma:   There is very slight interval decreased increased T2 and  FLAIR signal along the periphery of previous signal abnormality involving left  thalamus and midbrain and adjacent posterior margin left basal ganglia, with  persistent mild low T1 and moderate increased T2 and FLAIR signal. Slight  interval decreased enlargement of the left midbrain. Stable mild residual  effacement of the third ventricle and effacement of the upper aqueduct with no  hydrocephalus. Interval resolution of previous enhancing component of this  lesion. Stable small focus of low T1 and increased T2 signal right thalamus and stable  several foci and short linear areas of increased T2 and FLAIR signal central  tom. No evidence of new cortical or white matter signal abnormality with no new  mass or hemorrhage. Ventricles and sulci:  No hydrocephalus or significant volume loss. Extra-axial:  No extra-axial fluid collection or mass is noted. Brain vasculature:  No vascular abnormality is appreciated on this routine brain  MR examination. Gadolinium enhancement:  No abnormal enhancement is appreciated. Craniocervical junction:  Normal.     Skull base, extracranial and calvarium:  Minimal ethmoid and sphenoid sinus  mucosal thickening and posterior left maxillary sinus. IACs and mastoids sella  and skull unremarkable. IMPRESSION  Impression:           1. Persistent signal abnormality left thalamus and midbrain slightly improved  along the periphery with slight improvement in degree of midbrain expansion,  with resolution of previous enhancement. Differential diagnosis continues to  include site of involvement of neurosarcoidosis or demyelinating process. Infectious etiology of cerebritis not entirely excluded. Given improvement in  previous enhancement, neoplasm less likely. 2. Stable areas of chronic signal changes right thalamus and tom which may  represent sequela of previous chronic neurosarcoidosis or areas of chronic  infarct or demyelination.      3. No evidence of new acute intracranial finding or other significant change. Mri exam imaging personally reviewed  Assessment and Plan:   Shreyas Schulte is a 39 y.o. right handed male whose history and physical are consistent with left hand pain secondary to spasticity from cva . Shreyas Schulte who has risk factors including  Left hemiparesis secondary to cva/ neuroscarcoidosis. Janine Garcia was seen today for follow-up and neurologic problem. Diagnoses and all orders for this visit:    Spastic hemiplegia ([address]    Muscle spasticity    Left hemiparesis ([address]    Neurosarcoidosis ([address]    Contracture, left hand      Follow-up Disposition:  Return in about 2 months (around 5/27/2017). Reviewed with patient and mother recommendationsReviewed notes in chart ,need for mri cd [facility] Will discuss with Dr Lawanda Peabody need for tendon release before more botox. Discussed referral for treatment of neurosarcoidosisI spent 40  minutes with the patient in face-to-face consultation, of which greater than 50% was spent in counseling and coordination of care as described above.

## 2017-04-03 NOTE — PROGRESS NOTES
[facility]  OFFICE PROCEDURE PROGRESS NOTE        Chart reviewed for the following:   Connie Baez LPN, have reviewed the History, Physical and updated the Allergic reactions for Redge Johnson performed immediately prior to start of procedure:   Mariam CASSIDY LPN, have performed the following reviews on Raegan Cardenas. prior to the start of the procedure:            * Patient was identified by name and date of birth   * Agreement on procedure being performed was verified  * Risks and Benefits explained to the patient  * Procedure site verified and marked as necessary  * Patient was positioned for comfort  * Consent was signed and verified     Time: 8:30 AM      Date of procedure: 4/3/2017    Procedure performed by:  Aniceto Padilla MD    Patient assisted by: mother    How tolerated by patient: tolerated the procedure well with no complications    Post Procedural Pain Scale: 0 - No Hurt    Comments: none

## 2017-04-03 NOTE — PROGRESS NOTES
[facility]  [address] [facility] [place], [address]  [phone]      [address]    Neurophysiology Report      Patient: Izzy Najera     ID: [id number] Physician:    Gender: Male Ref Phys:    Handedness:      Study Date: April 3, 2017         Patient History: This 58-year-old patient has severe spasticity of the left hand after stroke. On brief exam spasticity of the left hand especially of finger flexion. EMG     Side Muscle Nerve Root Ins Act Fibs Psw Fasc Amp Dur Poly Recrt Int Jeaneen Cola Comment   Left FlexDigSuper Median C7-8 Nml Nml Nml None Nml Nml 0 Nml Nml    Left FlexDigProf Ulnar C8,T1 Nml Nml Nml None Nml Nml 0 Nml Nml    Left FlexCarRad Median C6-7 Nml Nml Nml None Nml Nml 0 Nml Nml    Left FlexCarpiUln Ulnar C8,T1 Nml Nml Nml None Nml Nml 0 Nml Nml      NCS/EMG FINDINGS:     All examined muscles (as indicated in the following table) showed no evidence of membrane instability. INTERPRETATION:       ___________________________            Waveforms:    Botox Procedure    Indications: Severe spasticity after stroke. No diagnosis found. Last injection was December 2016, without relief, and now it has been 3 months since last injection and attempt is going to be made using electromyographic guidance. Procedure: Botox was prepared in the usual fashion using 2 mL for the 200 unit vial.  The medication was injected under EMG guidance as follows: All injections are on the left side. The flexor digitorum sublimis was injected in 2 locations with 30 and 40 units in the locations for a total of 70 units injected. The flexor carpi radialis was injected in one location 30 units for a total of 30 units injected. The flexor carpi ulnaris was injected in one location with a total of 30 units in that location  The flexor digitorum profundus was injected in 2 locations 20 units in each location for a total of 40 units injected.     The total injected was 170 units with 30 units being wasted. Patient tolerated procedure without any problems. Outpatient Encounter Prescriptions as of 4/3/2017   Medication Sig Dispense Refill    polyethylene glycol (MIRALAX) 17 gram packet Take 1 Packet by mouth daily. 30 Each 1    azaTHIOprine (IMURAN) 50 mg tablet Take 1 Tab by mouth daily. 60 Tab 0    buPROPion XL (WELLBUTRIN XL) 300 mg XL tablet Take 1 Tab by mouth every morning. 60 Tab 0    predniSONE (DELTASONE) 10 mg tablet Take 1 Tab by mouth daily (with breakfast). 100 Tab 0    tamsulosin (FLOMAX) 0.4 mg capsule Take 1 Cap by mouth daily. 60 Cap 0     No facility-administered encounter medications on file as of 4/3/2017.          The procedure was tolerated well with no complications

## 2017-04-03 NOTE — MR AVS SNAPSHOT
Visit Information Date & Time Provider Department Dept. Phone Encounter #  
 4/3/2017  8:40 AM Doris Curry [facility] [phone] [id number] Your Appointments 4/27/2017  8:30 AM  
ROUTINE CARE with Alvino Quiroga MD  
[address] [address]) Appt Note: Return in about 1 month (around 4/27/2017) for medication evaluation. [facility] [facility] [address] [place] [address] [address] [facility] [address]  
  
   
 [address] [facility] [address]  
  
    
 5/3/2017  8:40 AM  
Follow Up with Doris Curry MD  
[address]) Appt Note: 4wk f/u for injections [address] [facility] [address] [phone]  
  
   
 [place] [address]  
  
    
 5/26/2017  8:45 AM  
Follow Up with Lore Lama MD  
[address]) Appt Note: 2 month fu  
 [address] [facility] [address] [facility] [address]  
[phone]  
  
   
 [place] [address]  
  
    
 11/20/2017  2:30 PM  
Office Visit with Alvino Quiroga MD  
[address] [address]) Appt Note: Return in about 1 year (around 11/17/2017) for medicare wellness exam.  
 [facility] [address] [facility] [address] [facility]  
  
   
 [address] [facility] [address] [place] [address] Upcoming Health Maintenance Date Due DTaP/Tdap/Td series (1 - Tdap) 9/13/2000 Allergies as of 4/3/2017  Review Complete On: 4/3/2017 By: Yousuf Zaldivar LPN No Known Allergies Current Immunizations  Reviewed on 3/3/2017 Name Date Influenza Vaccine 10/12/2016 Pneumococcal Polysaccharide (PPSV-23) 11/17/2016  3:27 PM  
  
 Not reviewed this visit Vitals BP Pulse Temp Resp Height(growth percentile) Weight(growth percentile)  134/62 (BP 1 Location: Right arm, BP Patient Position: Sitting) (!) 112 99.2 °F (37.3 °C) (Oral) 14 6' 2\" (1.88 m) 178 lb (80.7 kg) SpO2 BMI Smoking Status 98% 22.85 kg/m2 Current Some Day Smoker Vitals History BMI and BSA Data Body Mass Index Body Surface Area  
 22.85 kg/m 2 2.05 m 2 Preferred Pharmacy Pharmacy Name Phone [facility] [address] [phone] Your Updated Medication List  
  
   
This list is accurate as of: 4/3/17  9:56 AM.  Always use your most recent med list.  
  
  
  
  
 azaTHIOprine 50 mg tablet Commonly known as:  The Pepsi Take 1 Tab by mouth daily. buPROPion  mg XL tablet Commonly known as:  Nadara Gipson Take 1 Tab by mouth every morning. polyethylene glycol 17 gram packet Commonly known as:  Beuford Hallmark Take 1 Packet by mouth daily. predniSONE 10 mg tablet Commonly known as:  Rande Rogel Take 1 Tab by mouth daily (with breakfast). tamsulosin 0.4 mg capsule Commonly known as:  FLOMAX Take 1 Cap by mouth daily. To-Do List   
 04/03/2017 2:00 PM  
  Appointment with Masha Gaston PTA at [address] [place] [address] SCHEDULING/INTAKE  
  
 04/05/2017 To Be Determined Appointment with Masha Gaston PTA at [address] [place] [address] SCHEDULING/INTAKE  
  
 04/07/2017 To Be Determined Appointment with Masha Gaston PTA at [address] [place] [address] & HEALTH SERVICES! Dear Marisela Garcia: Thank you for requesting a Euclid Media account. Our records indicate that you already have an active Euclid Media account. You can access your account anytime at [email or web address] Did you know that you can access your hospital and ER discharge instructions at any time in Euclid Media? You can also review all of your test results from your hospital stay or ER visit. Additional Information If you have questions, please visit the Frequently Asked Questions section of the Futurestream Networkshart website at [email or web address] Remember, IBeiFeng is NOT to be used for urgent needs. For medical emergencies, dial 911. Now available from your iPhone and Android! Please provide this summary of care documentation to your next provider. Your primary care clinician is listed as Lisandro Kohli. If you have any questions after today's visit, please call [phone].

## 2017-04-24 NOTE — PATIENT INSTRUCTIONS
Candidiasis: Care Instructions  Your Care Instructions  Candidiasis (say \"nty-udi-XZ-uh-darlin\") is a yeast infection. Yeast normally lives in your body. But it can cause problems if your body's defenses don't work as they should. Some medicines can increase your chance of getting a yeast infection. These include antibiotics, steroids, and cancer drugs. And some diseases like AIDS and diabetes can make you more likely to get yeast infections. There are different types of yeast infections. Reginald Real is a yeast infection in the mouth. It usually occurs in people with weak immune systems. It causes white patches inside the mouth and throat. Yeast infections of the skin usually occur in skin folds where the skin stays moist. They cause red, oozing patches on your skin. Babies can get these infections under the diaper. People who often wear gloves can get them on their hands. Many women get vaginal yeast infections. They are most common when women take antibiotics. These infections can cause the vagina to itch and burn. They also cause white discharge that looks like cottage cheese. In rare cases, yeast infects the blood. This can cause serious disease. This kind of infection is treated with medicine given through a needle into a vein (IV). After you start treatment, a yeast infection usually goes away quickly. But if your immune system is weak, the infection may come back. Tell your doctor if you get yeast infections often. Follow-up care is a key part of your treatment and safety. Be sure to make and go to all appointments, and call your doctor if you are having problems. It's also a good idea to know your test results and keep a list of the medicines you take. How can you care for yourself at home? · Take your medicines exactly as prescribed. Call your doctor if you think you are having a problem with your medicine. · Use antibiotics only as directed by your doctor. · Eat yogurt with live cultures.  It has bacteria called lactobacillus. It may help prevent some types of yeast infections. · Keep your skin clean and dry. Put powder on moist places. · If you are using a cream or suppository to treat a vaginal yeast infection, don't use condoms or a diaphragm. Use a different type of birth control. · Eat a healthy diet and get regular exercise. This will help keep your immune system strong. When should you call for help? Call your doctor now or seek immediate medical care if:  · You have a fever. · You are pregnant and have signs of a vaginal or urinary tract infection such as:  ¨ Severe itching in your vagina. ¨ Pain during sex or when you urinate. ¨ Unusual discharge from your vagina. ¨ A frequent urge to urinate. ¨ Urine that is cloudy or smells bad. Watch closely for changes in your health, and be sure to contact your doctor if:  · You do not get better as expected. Where can you learn more? Go to http://robin-gray.info/. Enter I178 in the search box to learn more about \"Candidiasis: Care Instructions. \"  Current as of: October 13, 2016  Content Version: 11.2  © 7891-7093 DabKick. Care instructions adapted under license by Medlio (which disclaims liability or warranty for this information). If you have questions about a medical condition or this instruction, always ask your healthcare professional. [address] any warranty or liability for your use of this information.

## 2017-04-24 NOTE — MR AVS SNAPSHOT
Visit Information Date & Time Provider Department Dept. Phone Encounter #  
 4/24/2017 10:45 AM Princess Reyez, [id number] Follow-up Instructions Return if symptoms worsen or fail to improve, for incontinence. Your Appointments 5/3/2017  8:40 AM  
Follow Up with Bonnie Valentine MD  
[address] [facility] Elizabeth June) Appt Note: 4wk f/u for injections [address] [facility] [address]  
[phone]  
  
   
 [place] [address]  
  
    
 5/26/2017  8:45 AM  
Follow Up with Parth Graham MD  
[address] Kristi June) Appt Note: 2 month fu  
 [address] [facility] [address] [facility] [address]  
[phone]  
  
   
 [place] [address]  
  
    
 11/20/2017  2:30 PM  
Office Visit with Princess Aissatou MD  
[address] [address] Elizabeth June) Appt Note: Return in about 1 year (around 11/17/2017) for medicare wellness exam.  
 [address] [facility] [address] 222 [facility]  
  
   
 [address] [place] [address] [address] Upcoming Health Maintenance Date Due DTaP/Tdap/Td series (1 - Tdap) 9/13/2000 Allergies as of 4/24/2017  Review Complete On: 4/24/2017 By: Princess Aissatou MD  
 No Known Allergies Current Immunizations  Reviewed on 3/3/2017 Name Date Influenza Vaccine 10/12/2016 Pneumococcal Polysaccharide (PPSV-23) 11/17/2016  3:27 PM  
  
 Not reviewed this visit You Were Diagnosed With   
  
 Codes Comments Thrush    -  Primary ICD-10-CM: B37.0 ICD-9-CM: 112.0 Cough     ICD-10-CM: R05 ICD-9-CM: 786.2 Neurosarcoidosis ([facility]     ICD-10-CM: Q26.16 
ICD-9-CM: 786 Left hemiparesis ([facility] [facility]     ICD-10-CM: G81.94 
ICD-9-CM: 342.90 Enuresis     ICD-10-CM: R32 
ICD-9-CM: 788.30 Vitals BP Pulse Temp Resp Height(growth percentile) Weight(growth percentile) 132/79 (BP 1 Location: Right arm, BP Patient Position: Sitting) (!) 110 98.6 °F (37 °C) (Oral) 18 6' 2\" (1.88 m) 180 lb 9.6 oz (81.9 kg) SpO2 BMI Smoking Status 98% 23.19 kg/m2 Current Some Day Smoker BMI and BSA Data Body Mass Index Body Surface Area  
 23.19 kg/m 2 2.07 m 2 Preferred Pharmacy Pharmacy Name Phone [facility] [address] [phone] Your Updated Medication List  
  
   
This list is accurate as of: 4/24/17 11:56 AM.  Always use your most recent med list.  
  
  
  
  
 azaTHIOprine 50 mg tablet Commonly known as:  The Pepsi Take 1 Tab by mouth daily. buPROPion  mg XL tablet Commonly known as:  Liz Patterson Take 1 Tab by mouth every morning. nystatin 100,000 unit/mL suspension Commonly known as:  MYCOSTATIN Take 5 mL by mouth four (4) times daily. swish and spit  
  
 polyethylene glycol 17 gram packet Commonly known as:  Kvng Kiko Take 1 Packet by mouth daily. predniSONE 10 mg tablet Commonly known as:  Raine Sake Take 1 Tab by mouth daily (with breakfast). tamsulosin 0.4 mg capsule Commonly known as:  FLOMAX Take 1 Cap by mouth daily. Prescriptions Sent to Pharmacy Refills  
 nystatin (MYCOSTATIN) 100,000 unit/mL suspension 0 Sig: Take 5 mL by mouth four (4) times daily. swish and spit Class: Normal  
 Pharmacy: [facility] [place] [address] Ph #: [phone] Route: Oral  
  
We Performed the Following AMB POC URINALYSIS DIP STICK AUTO W/ MICRO [09807 CPT(R)] REFERRAL TO ENT-OTOLARYNGOLOGY [DYZ64 Custom] Comments:  
 Please evaluate 38 y/o male patient for cough with concern for allergic rhinitis. Follow-up Instructions Return if symptoms worsen or fail to improve, for incontinence. Referral Information Referral ID Referred By Referred To  
  
 [id number] Laney MADRIGAL Not Available Visits Status Start Date End Date 1 New Request 4/24/17 4/24/18 If your referral has a status of pending review or denied, additional information will be sent to support the outcome of this decision. Patient Instructions Candidiasis: Care Instructions Your Care Instructions Candidiasis (say \"hqw-mwm-DM-uh-darlin\") is a yeast infection. Yeast normally lives in your body. But it can cause problems if your body's defenses don't work as they should. Some medicines can increase your chance of getting a yeast infection. These include antibiotics, steroids, and cancer drugs. And some diseases like AIDS and diabetes can make you more likely to get yeast infections. There are different types of yeast infections. Eulalio Lida is a yeast infection in the mouth. It usually occurs in people with weak immune systems. It causes white patches inside the mouth and throat. Yeast infections of the skin usually occur in skin folds where the skin stays moist. They cause red, oozing patches on your skin. Babies can get these infections under the diaper. People who often wear gloves can get them on their hands. Many women get vaginal yeast infections. They are most common when women take antibiotics. These infections can cause the vagina to itch and burn. They also cause white discharge that looks like cottage cheese. In rare cases, yeast infects the blood. This can cause serious disease. This kind of infection is treated with medicine given through a needle into a vein (IV). After you start treatment, a yeast infection usually goes away quickly. But if your immune system is weak, the infection may come back. Tell your doctor if you get yeast infections often. Follow-up care is a key part of your treatment and safety.  Be sure to make and go to all appointments, and call your doctor if you are having problems. It's also a good idea to know your test results and keep a list of the medicines you take. How can you care for yourself at home? · Take your medicines exactly as prescribed. Call your doctor if you think you are having a problem with your medicine. · Use antibiotics only as directed by your doctor. · Eat yogurt with live cultures. It has bacteria called lactobacillus. It may help prevent some types of yeast infections. · Keep your skin clean and dry. Put powder on moist places. · If you are using a cream or suppository to treat a vaginal yeast infection, don't use condoms or a diaphragm. Use a different type of birth control. · Eat a healthy diet and get regular exercise. This will help keep your immune system strong. When should you call for help? Call your doctor now or seek immediate medical care if: 
· You have a fever. · You are pregnant and have signs of a vaginal or urinary tract infection such as: ¨ Severe itching in your vagina. ¨ Pain during sex or when you urinate. ¨ Unusual discharge from your vagina. ¨ A frequent urge to urinate. ¨ Urine that is cloudy or smells bad. Watch closely for changes in your health, and be sure to contact your doctor if: 
· You do not get better as expected. Where can you learn more? Go to http://robin-gray.info/. Enter E180 in the search box to learn more about \"Candidiasis: Care Instructions. \" Current as of: October 13, 2016 Content Version: 11.2 © 6755-4073 Healthwise, Incorporated. Care instructions adapted under license by RawFlow (which disclaims liability or warranty for this information). If you have questions about a medical condition or this instruction, always ask your healthcare professional. [address] any warranty or liability for your use of this information. Introducing [facility] & HEALTH SERVICES! Dear Nikky Alfaro: Thank you for requesting a Ning account. Our records indicate that you already have an active Ning account. You can access your account anytime at [email or web address] Did you know that you can access your hospital and ER discharge instructions at any time in Ning? You can also review all of your test results from your hospital stay or ER visit. Additional Information If you have questions, please visit the Frequently Asked Questions section of the Ning website at [email or web address] Remember, Ning is NOT to be used for urgent needs. For medical emergencies, dial 911. Now available from your iPhone and Android! Please provide this summary of care documentation to your next provider. Your primary care clinician is listed as [place]. If you have any questions after today's visit, please call [phone].

## 2017-04-24 NOTE — PROGRESS NOTES
1. Have you been to the ER, urgent care clinic since your last visit? Hospitalized since your last visit? No    2. Have you seen or consulted any other health care providers outside of the [address] [place] [address] since your last visit? Include any pap smears or colon screening.  No

## 2017-04-24 NOTE — PROGRESS NOTES
Madan [place]. is a 39 y.o.  male and presents with    Chief Complaint   Patient presents with    Mouth Pain           Subjective:  Mr. Reuben Plunkett presents c/o burning sensation in his mouth with white lesions on his tongue. symptoms started a week ago. it has progressed but has not worsened since that time. he denies fever or chills. he denies dysuria or pain with defecation. he has used steroids for recent meningitis. He is followed by neurology and is receiving botox injections in left hand to treat contracture. Patient Active Problem List   Diagnosis Code    Neurosarcoidosis ([facility] D86.89    Left hemiparesis ([facility] G81.94    Advance care planning Z71.89    Meningitis G03.9    Headache R51     Patient Active Problem List    Diagnosis Date Noted    Headache 03/03/2017    Meningitis 03/02/2017    Advance care planning 11/17/2016    [facility] 10/17/2016    Left hemiparesis ([facility] 10/17/2016     Current Outpatient Prescriptions   Medication Sig Dispense Refill    polyethylene glycol (MIRALAX) 17 gram packet Take 1 Packet by mouth daily. 30 Each 1    azaTHIOprine (IMURAN) 50 mg tablet Take 1 Tab by mouth daily. 60 Tab 0    buPROPion XL (WELLBUTRIN XL) 300 mg XL tablet Take 1 Tab by mouth every morning. 60 Tab 0    predniSONE (DELTASONE) 10 mg tablet Take 1 Tab by mouth daily (with breakfast). 100 Tab 0    tamsulosin (FLOMAX) 0.4 mg capsule Take 1 Cap by mouth daily. 60 Cap 0     No Known Allergies  Past Medical History:   Diagnosis Date    Arm pain     [facility]cerebrovascular accident) [facility] 2012    Meningitis     Meningitis     Neurosarcoidosis ([facility] 2012     Past Surgical History:   Procedure Laterality Date    HX HIP REPLACEMENT  2010    HX WISDOM TEETH EXTRACTION       No family history on file.   Social History   Substance Use Topics    Smoking status: Current Some Day Smoker     Packs/day: 0.25    Smokeless tobacco: Never Used    Alcohol use No ROS   General ROS: negative for - chills or fever  Psychological ROS: negative for - anxiety or depression  Ophthalmic ROS: negative for - blurry vision  ENT ROS: negative for - headaches  Respiratory ROS: + cough, shortness of breath, or wheezing  Cardiovascular ROS: no chest pain or dyspnea on exertion  Gastrointestinal ROS: no abdominal pain, change in bowel habits, or black or bloody stools  Genito-Urinary ROS: +nighttime urination with incontinence; it is not every night  Dermatological ROS: negative for - rash or skin lesion changes    All other systems reviewed and are negative. Objective:  Vitals:    04/24/17 1057   BP: 132/79   Pulse: (!) 110   Resp: 18   Temp: 98.6 °F (37 °C)   TempSrc: Oral   SpO2: 98%   Weight: 180 lb 9.6 oz (81.9 kg)   Height: 6' 2\" (1.88 m)   PainSc:   5       alert, well appearing, and in no distress, oriented to person, place, and time and normal appearing weight  Mental status - slurred speech; normal mood with appropriate affect  Mouth - thrush noted  Neurological - abnormal neurological exam unchanged from prior examinations    Assessment/Plan:    1. Thrush  Taking long term steroids; start nystatin  - nystatin (MYCOSTATIN) 100,000 unit/mL suspension; Take 5 mL by mouth four (4) times daily. swish and spit  Dispense: 140 mL; Refill: 0    2. Cough  Concern for allergic rhinitis; start anti-histamin  - REFERRAL TO ENT-OTOLARYNGOLOGY    3. Neurosarcoidosis ([facility] [address] [facility]  F/u with neurology; cleared to participate in adult  activities    4. Left hemiparesis ([facility]  Cane for gait instability    5. Nocturnal enuresis    Lab review: urinalysis +blood and protein      I have discussed the diagnosis with the patient and the intended plan as seen in the above orders. The patient has received an after-visit summary and questions were answered concerning future plans. I have discussed medication side effects and warnings with the patient as well.  I have reviewed the plan of care with the patient, accepted their input and they are in agreement with the treatment goals. Follow-up Disposition:  Return if symptoms worsen or fail to improve. More than 1/2 of this 25 minute visit was spent in counselling and coordination of care, as described above.

## 2017-05-04 NOTE — TELEPHONE ENCOUNTER
MAGED for patient. Calling to follow-up on referral from  11/23/16 for Psychology. [facility]. We can not get records from his visit with them until  a release in their office is singed. Release needs to be signed so dr. Candice Navas can get these notes.

## 2017-05-09 NOTE — MR AVS SNAPSHOT
Visit Information Date & Time Provider Department Dept. Phone Encounter #  
 5/9/2017  1:40 PM Lyla Gilmore MD [employer] [phone] [id number] Follow-up Instructions Return in about 6 months (around 11/9/2017). Follow-up and Disposition History Your Appointments 5/26/2017  8:45 AM  
Follow Up with Caridad Trevino MD  
[employer] [facility]) Appt Note: 2 month fu  
 [address] [facility] [address] [place] [address] [phone]  
  
   
 [place] [address]  
  
    
 11/7/2017  8:40 AM  
Follow Up with Lyla Gilmore MD  
[employer] [facility]) Appt Note: 6mon f/u  
 [address] [facility] [address] [place] [address]  
[phone]  
  
   
 [place] [address]  
  
    
 11/20/2017  2:30 PM  
Office Visit with Calos Perez MD  
[address] [address]) Appt Note: Return in about 1 year (around 11/17/2017) for medicare wellness exam.  
 [address] [facility] [address] [facility] [address] 222 [facility]  
  
   
 [address] [facility] [address] Upcoming Health Maintenance Date Due DTaP/Tdap/Td series (1 - Tdap) 9/13/2000 INFLUENZA AGE 9 TO ADULT 8/1/2017 Allergies as of 5/9/2017  Review Complete On: 5/9/2017 By: Jose Roberto Koehler LPN No Known Allergies Current Immunizations  Reviewed on 3/3/2017 Name Date Influenza Vaccine 10/12/2016 Pneumococcal Polysaccharide (PPSV-23) 11/17/2016  3:27 PM  
  
 Not reviewed this visit You Were Diagnosed With   
  
 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 
ICD-9-CM: 342.90 Vitals BP Pulse Temp Resp Height(growth percentile) Weight(growth percentile) 102/68 (BP 1 Location: Left arm, BP Patient Position: Sitting) (!) 112 98.7 °F (37.1 °C) (Oral) 14 6' 2\" (1.88 m) 182 lb 9.6 oz (82.8 kg) SpO2 BMI Smoking Status 98% 23.44 kg/m2 Current Some Day Smoker Vitals History BMI and BSA Data Body Mass Index Body Surface Area  
 23.44 kg/m 2 2.08 m 2 Preferred Pharmacy Pharmacy Name Phone [facility] [address] [phone] Your Updated Medication List  
  
   
This list is accurate as of: 5/9/17  2:32 PM.  Always use your most recent med list.  
  
  
  
  
 azaTHIOprine 50 mg tablet Commonly known as:  The Pepsi Take 1 Tab by mouth daily. buPROPion  mg XL tablet Commonly known as:  Yadira Crumbly Take 1 Tab by mouth every morning. nystatin 100,000 unit/mL suspension Commonly known as:  MYCOSTATIN Take 5 mL by mouth four (4) times daily. swish and spit  
  
 polyethylene glycol 17 gram packet Commonly known as:  Jazmin Moynahan Take 1 Packet by mouth daily. predniSONE 10 mg tablet Commonly known as:  Rena Brody Take 1 Tab by mouth daily (with breakfast). tamsulosin 0.4 mg capsule Commonly known as:  FLOMAX Take 1 Cap by mouth daily. Follow-up Instructions Return in about 6 months (around 11/9/2017). Introducing [facility] & HEALTH SERVICES! Dear Yevette Mcburney: Thank you for requesting a Challenge Games account. Our records indicate that you already have an active [employer] Games account. You can access your account anytime at [email or web address] Did you know that you can access your hospital and ER discharge instructions at any time in Challenge Games? You can also review all of your test results from your hospital stay or ER visit. Additional Information If you have questions, please visit the Frequently Asked Questions section of the Trendyta website at [email or web address] Remember, Trendyta is NOT to be used for urgent needs. For medical emergencies, dial 911. Now available from your iPhone and Android! Please provide this summary of care documentation to your next provider. Your primary care clinician is listed as Nora Simmons. If you have any questions after today's visit, please call [phone].

## 2017-05-09 NOTE — LETTER
5/9/2017 2:28 PM 
 
Patient:  Maria L Tamez YOB: 1980 Date of Visit: 5/9/2017 Dear Darlin Celis MD 
[facility] [address] [facility] [id number] VIA Facsimile: [phone] 
 : 
 
 
Thank you for referring Mr. Glenn Orourke to me for evaluation/treatment. Below are the relevant portions of my assessment and plan of care. Re:  Glenn Orourke Jr.,Follow up visit     5/9/2017 11:16 AM 
 
SSN: [id number] Subjective:   Maria L Tamez returns with his mother for follow up after I injected the left arm for spasticity after a stroke which occurred back in 2014. There doesn't seem to be much change after the injection. He still has significant spasticity and flexion of the left hand. Medications:   
Current Outpatient Prescriptions Medication Sig Dispense Refill  nystatin (MYCOSTATIN) 100,000 unit/mL suspension Take 5 mL by mouth four (4) times daily. swish and spit 140 mL 0  
 polyethylene glycol (MIRALAX) 17 gram packet Take 1 Packet by mouth daily. 30 Each 1  
 azaTHIOprine (IMURAN) 50 mg tablet Take 1 Tab by mouth daily. 60 Tab 0  
 buPROPion XL (WELLBUTRIN XL) 300 mg XL tablet Take 1 Tab by mouth every morning. 60 Tab 0  predniSONE (DELTASONE) 10 mg tablet Take 1 Tab by mouth daily (with breakfast). 100 Tab 0  
 tamsulosin (FLOMAX) 0.4 mg capsule Take 1 Cap by mouth daily. 60 Cap 0 Vital signs:   
Visit Vitals  /68 (BP 1 Location: Left arm, BP Patient Position: Sitting)  Pulse (!) 112  Temp 98.7 °F (37.1 °C) (Oral)  Resp 14  
 Ht 6' 2\" (1.88 m)  Wt 82.8 kg (182 lb 9.6 oz)  SpO2 98%  BMI 23.44 kg/m2 Review of Systems: As above otherwise 11 point review of systems negative including;  
Constitutional no fever or chills Skin denies rash or itching HEENT  Denies tinnitus, hearing lose Eyes denies diplopia vision lose Respiratory denies sortness of breath Cardiovascular denies chest pain, dyspnea on exertion Gastrointestinal denies nausea, vomiting, diarrhea, constipation Genitourinary denies incontinence Musculoskeletal denies joint pain or swelling Endocrine denies weight change Hematology denies easy bruising or bleeding Neurological as above in HPI Patient Active Problem List  
Diagnosis Code  Neurosarcoidosis ([facility] [address] D86.89  Left hemiparesis (HCC) G81.94  
 Advance care planning Z71.89  
 Meningitis G03.9  Headache R51 Objective: The patient is awake, alert, and oriented x 4. Fund of knowledge is adequate. Speech is fluent and memory is intact. Cranial Nerves: II  Visual fields are full to confrontation. III, IV, VI  Extraocular movements are intact. There is no nystagmus. V  Facial sensation is intact to pinprick. VII  Face is symmetrical.  VIII - Hearing is present. IX, X, XII  Palate is symmetrical.   XI - Shoulder shrugging and head turning intact Motor: The patient has significant spasticity of the left hand. He can't open the hand passively, especially the middle finger stays significantly flexed. There doesn't seem to have been any improvement after the injection of 170 units of Botox into the finger flexors. Overall strength is no better than 2/5 distally, 4/5 more proximally. The leg is also weak at about 4/5 throughout. Reflexes are 2+ and symmetrical. Plantars are down going. Gait is left hemiparetic. CBC:  
Lab Results Component Value Date/Time WBC 9.9 03/08/2017 04:48 AM  
 RBC 4.14 03/08/2017 04:48 AM  
 HGB 11.1 03/08/2017 04:48 AM  
 HCT 34.6 03/08/2017 04:48 AM  
 PLATELET 579 95/23/7609 04:48 AM  
 
BMP:  
Lab Results Component Value Date/Time  Glucose 91 03/08/2017 04:48 AM  
 Sodium 141 03/08/2017 04:48 AM  
 Potassium 3.5 03/08/2017 04:48 AM  
 Chloride 106 03/08/2017 04:48 AM  
 CO2 26 03/08/2017 04:48 AM  
 BUN 9 03/08/2017 04:48 AM  
 Creatinine 0.65 03/08/2017 04:48 AM  
 Calcium 7.9 03/08/2017 04:48 AM  
 
CMP:  
Lab Results Component Value Date/Time Glucose 91 03/08/2017 04:48 AM  
 Sodium 141 03/08/2017 04:48 AM  
 Potassium 3.5 03/08/2017 04:48 AM  
 Chloride 106 03/08/2017 04:48 AM  
 CO2 26 03/08/2017 04:48 AM  
 BUN 9 03/08/2017 04:48 AM  
 Creatinine 0.65 03/08/2017 04:48 AM  
 Calcium 7.9 03/08/2017 04:48 AM  
 Anion gap 9 03/08/2017 04:48 AM  
 BUN/Creatinine ratio 14 03/08/2017 04:48 AM  
 Alk. phosphatase 66 03/08/2017 04:48 AM  
 Protein, total 5.3 03/08/2017 04:48 AM  
 Albumin 2.4 03/08/2017 04:48 AM  
 Globulin 2.9 03/08/2017 04:48 AM  
 A-G Ratio 0.8 03/08/2017 04:48 AM  
 
Coagulation: No results found for: PTP, INR, APTT, PTTT Cardiac markers:  
Lab Results Component Value Date/Time  03/02/2017 04:50 PM  
 CK-MB Index 0.1 03/02/2017 04:50 PM  
 
 
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 No relief with second injection. Plan:  Probably needs tendon release procedure especially for the middle finger which isn't moving at all. Return here after he has surgery. Sincerely, 
 
 
 
Massimo Hyatt. Clark Pacheco M.D. If you have questions, please do not hesitate to call me. I look forward to following Mr. Jasiel Bentley along with you.  
 
 
 
Sincerely, 
 
 
Rodney Dominguez MD

## 2017-05-09 NOTE — COMMUNICATION BODY
Re:  Bebe Smyth Jr.,Follow up visit     5/9/2017 11:16 AM    SSN: [id number]    Subjective:   Corona Dooley. returns with his mother for follow up after I injected the left arm for spasticity after a stroke which occurred back in 2014. There doesn't seem to be much change after the injection. He still has significant spasticity and flexion of the left hand. Medications:    Current Outpatient Prescriptions   Medication Sig Dispense Refill    nystatin (MYCOSTATIN) 100,000 unit/mL suspension Take 5 mL by mouth four (4) times daily. swish and spit 140 mL 0    polyethylene glycol (MIRALAX) 17 gram packet Take 1 Packet by mouth daily. 30 Each 1    azaTHIOprine (IMURAN) 50 mg tablet Take 1 Tab by mouth daily. 60 Tab 0    buPROPion XL (WELLBUTRIN XL) 300 mg XL tablet Take 1 Tab by mouth every morning. 60 Tab 0    predniSONE (DELTASONE) 10 mg tablet Take 1 Tab by mouth daily (with breakfast). 100 Tab 0    tamsulosin (FLOMAX) 0.4 mg capsule Take 1 Cap by mouth daily.  60 Cap 0       Vital signs:    Visit Vitals    /68 (BP 1 Location: Left arm, BP Patient Position: Sitting)    Pulse (!) 112    Temp 98.7 °F (37.1 °C) (Oral)    Resp 14    Ht 6' 2\" (1.88 m)    Wt 82.8 kg (182 lb 9.6 oz)    SpO2 98%    BMI 23.44 kg/m2       Review of Systems:   As above otherwise 11 point review of systems negative including;   Constitutional no fever or chills  Skin denies rash or itching  HEENT  Denies tinnitus, hearing lose  Eyes denies diplopia vision lose  Respiratory denies sortness of breath  Cardiovascular denies chest pain, dyspnea on exertion  Gastrointestinal denies nausea, vomiting, diarrhea, constipation  Genitourinary denies incontinence  Musculoskeletal denies joint pain or swelling  Endocrine denies weight change  Hematology denies easy bruising or bleeding   Neurological as above in HPI      Patient Active Problem List   Diagnosis Code    Neurosarcoidosis ([facility] [address] D86.89    [facility] G81.94    Advance care planning Z71.89    Meningitis G03.9    Headache R51         Objective: The patient is awake, alert, and oriented x 4. Fund of knowledge is adequate. Speech is fluent and memory is intact. Cranial Nerves: II - Visual fields are full to confrontation. III, IV, VI - Extraocular movements are intact. There is no nystagmus. V - Facial sensation is intact to pinprick. VII - Face is symmetrical.  VIII - Hearing is present. IX, X, XII - Palate is symmetrical.   XI - Shoulder shrugging and head turning intact  Motor: The patient has significant spasticity of the left hand. He can't open the hand passively, especially the middle finger stays significantly flexed. There doesn't seem to have been any improvement after the injection of 170 units of Botox into the finger flexors. Overall strength is no better than 2/5 distally, 4/5 more proximally. The leg is also weak at about 4/5 throughout. Reflexes are 2+ and symmetrical. Plantars are down going. Gait is left hemiparetic.     CBC:   Lab Results   Component Value Date/Time    WBC 9.9 03/08/2017 04:48 AM    RBC 4.14 03/08/2017 04:48 AM    HGB 11.1 03/08/2017 04:48 AM    HCT 34.6 03/08/2017 04:48 AM    PLATELET 432 71/03/5348 04:48 AM     BMP:   Lab Results   Component Value Date/Time    Glucose 91 03/08/2017 04:48 AM    Sodium 141 03/08/2017 04:48 AM    Potassium 3.5 03/08/2017 04:48 AM    Chloride 106 03/08/2017 04:48 AM    CO2 26 03/08/2017 04:48 AM    BUN 9 03/08/2017 04:48 AM    Creatinine 0.65 03/08/2017 04:48 AM    Calcium 7.9 03/08/2017 04:48 AM     CMP:   Lab Results   Component Value Date/Time    Glucose 91 03/08/2017 04:48 AM    Sodium 141 03/08/2017 04:48 AM    Potassium 3.5 03/08/2017 04:48 AM    Chloride 106 03/08/2017 04:48 AM    CO2 26 03/08/2017 04:48 AM    BUN 9 03/08/2017 04:48 AM    Creatinine 0.65 03/08/2017 04:48 AM    Calcium 7.9 03/08/2017 04:48 AM    Anion gap 9 03/08/2017 04:48 AM    BUN/Creatinine ratio 14 03/08/2017 04:48 AM    Alk. phosphatase 66 03/08/2017 04:48 AM    Protein, total 5.3 03/08/2017 04:48 AM    Albumin 2.4 03/08/2017 04:48 AM    Globulin 2.9 03/08/2017 04:48 AM    A-G Ratio 0.8 03/08/2017 04:48 AM     Coagulation: No results found for: PTP, INR, APTT, PTTT  Cardiac markers:   Lab Results   Component Value Date/Time     03/02/2017 04:50 PM    CK-MB Index 0.1 03/02/2017 04:50 PM       Assessment:  Severe spasticity after stroke leaving him with significant flexion deformities of the left hand. No relief with second injection. Plan:  Probably needs tendon release procedure especially for the middle finger which isn't moving at all. Return here after he has surgery. Sincerely,        Randall Loya.  Jaylyn Marie M.D.

## 2017-05-09 NOTE — LETTER
5/9/2017 2:27 PM 
 
Patient:  Cecilia Sahni. YOB: 1980 Date of Visit: 5/9/2017 Dear Nishant Sy MD 
[address] [phone] VIA In Basket 
 : Thank you for referring Mr. Louis Elliott to me for evaluation/treatment. Below are the relevant portions of my assessment and plan of care. Re:  Louis Elliott Jr.,Follow up visit     5/9/2017 11:16 AM 
 
SSN: [id number] Subjective:   Cecilia Sahni. returns with his mother for follow up after I injected the left arm for spasticity after a stroke which occurred back in 2014. There doesn't seem to be much change after the injection. He still has significant spasticity and flexion of the left hand. Medications:   
Current Outpatient Prescriptions Medication Sig Dispense Refill  nystatin (MYCOSTATIN) 100,000 unit/mL suspension Take 5 mL by mouth four (4) times daily. swish and spit 140 mL 0  
 polyethylene glycol (MIRALAX) 17 gram packet Take 1 Packet by mouth daily. 30 Each 1  
 azaTHIOprine (IMURAN) 50 mg tablet Take 1 Tab by mouth daily. 60 Tab 0  
 buPROPion XL (WELLBUTRIN XL) 300 mg XL tablet Take 1 Tab by mouth every morning. 60 Tab 0  predniSONE (DELTASONE) 10 mg tablet Take 1 Tab by mouth daily (with breakfast). 100 Tab 0  
 tamsulosin (FLOMAX) 0.4 mg capsule Take 1 Cap by mouth daily. 60 Cap 0 Vital signs:   
Visit Vitals  /68 (BP 1 Location: Left arm, BP Patient Position: Sitting)  Pulse (!) 112  Temp 98.7 °F (37.1 °C) (Oral)  Resp 14  
 Ht 6' 2\" (1.88 m)  Wt 82.8 kg (182 lb 9.6 oz)  SpO2 98%  BMI 23.44 kg/m2 Review of Systems: As above otherwise 11 point review of systems negative including;  
Constitutional no fever or chills Skin denies rash or itching HEENT  Denies tinnitus, hearing lose Eyes denies diplopia vision lose Respiratory denies sortness of breath Cardiovascular denies chest pain, dyspnea on exertion Gastrointestinal denies nausea, vomiting, diarrhea, constipation Genitourinary denies incontinence Musculoskeletal denies joint pain or swelling Endocrine denies weight change Hematology denies easy bruising or bleeding Neurological as above in HPI Patient Active Problem List  
Diagnosis Code  Neurosarcoidosis ([facility] [address] D86.89  Left hemiparesis (HCC) G81.94  
 Advance care planning Z71.89  
 Meningitis G03.9  Headache R51 Objective: The patient is awake, alert, and oriented x 4. Fund of knowledge is adequate. Speech is fluent and memory is intact. Cranial Nerves: II  Visual fields are full to confrontation. III, IV, VI  Extraocular movements are intact. There is no nystagmus. V  Facial sensation is intact to pinprick. VII  Face is symmetrical.  VIII - Hearing is present. IX, X, XII  Palate is symmetrical.   XI - Shoulder shrugging and head turning intact Motor: The patient has significant spasticity of the left hand. He can't open the hand passively, especially the middle finger stays significantly flexed. There doesn't seem to have been any improvement after the injection of 170 units of Botox into the finger flexors. Overall strength is no better than 2/5 distally, 4/5 more proximally. The leg is also weak at about 4/5 throughout. Reflexes are 2+ and symmetrical. Plantars are down going. Gait is left hemiparetic. CBC:  
Lab Results Component Value Date/Time WBC 9.9 03/08/2017 04:48 AM  
 RBC 4.14 03/08/2017 04:48 AM  
 HGB 11.1 03/08/2017 04:48 AM  
 HCT 34.6 03/08/2017 04:48 AM  
 PLATELET 847 15/13/3078 04:48 AM  
 
BMP:  
Lab Results Component Value Date/Time  Glucose 91 03/08/2017 04:48 AM  
 Sodium 141 03/08/2017 04:48 AM  
 Potassium 3.5 03/08/2017 04:48 AM  
 Chloride 106 03/08/2017 04:48 AM  
 CO2 26 03/08/2017 04:48 AM  
 BUN 9 03/08/2017 04:48 AM  
 Creatinine 0.65 03/08/2017 04:48 AM  
 Calcium 7.9 03/08/2017 04:48 AM  
 
CMP:  
Lab Results Component Value Date/Time Glucose 91 03/08/2017 04:48 AM  
 Sodium 141 03/08/2017 04:48 AM  
 Potassium 3.5 03/08/2017 04:48 AM  
 Chloride 106 03/08/2017 04:48 AM  
 CO2 26 03/08/2017 04:48 AM  
 BUN 9 03/08/2017 04:48 AM  
 Creatinine 0.65 03/08/2017 04:48 AM  
 Calcium 7.9 03/08/2017 04:48 AM  
 Anion gap 9 03/08/2017 04:48 AM  
 BUN/Creatinine ratio 14 03/08/2017 04:48 AM  
 Alk. phosphatase 66 03/08/2017 04:48 AM  
 Protein, total 5.3 03/08/2017 04:48 AM  
 Albumin 2.4 03/08/2017 04:48 AM  
 Globulin 2.9 03/08/2017 04:48 AM  
 A-G Ratio 0.8 03/08/2017 04:48 AM  
 
Coagulation: No results found for: PTP, INR, APTT, PTTT Cardiac markers:  
Lab Results Component Value Date/Time  03/02/2017 04:50 PM  
 CK-MB Index 0.1 03/02/2017 04:50 PM  
 
 
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 No relief with second injection. Plan:  Probably needs tendon release procedure especially for the middle finger which isn't moving at all. Return here after he has surgery. Sincerely, 
 
 
 
Dotty Schwarz. Laurel Dexter M.D. If you have questions, please do not hesitate to call me. I look forward to following Mr. Claudia Robison along with you.  
 
 
 
Sincerely, 
 
 
Karel Azevedo MD

## 2017-05-09 NOTE — PROGRESS NOTES
Re:  Salas Abarca JrMary,Follow up visit     5/9/2017 11:16 AM    SSN: [id number]    Subjective:   Laura Sanchez. returns with his mother for follow up after I injected the left arm for spasticity after a stroke which occurred back in 2014. There doesn't seem to be much change after the injection. He still has significant spasticity and flexion of the left hand. Medications:    Current Outpatient Prescriptions   Medication Sig Dispense Refill    nystatin (MYCOSTATIN) 100,000 unit/mL suspension Take 5 mL by mouth four (4) times daily. swish and spit 140 mL 0    polyethylene glycol (MIRALAX) 17 gram packet Take 1 Packet by mouth daily. 30 Each 1    azaTHIOprine (IMURAN) 50 mg tablet Take 1 Tab by mouth daily. 60 Tab 0    buPROPion XL (WELLBUTRIN XL) 300 mg XL tablet Take 1 Tab by mouth every morning. 60 Tab 0    predniSONE (DELTASONE) 10 mg tablet Take 1 Tab by mouth daily (with breakfast). 100 Tab 0    tamsulosin (FLOMAX) 0.4 mg capsule Take 1 Cap by mouth daily.  60 Cap 0       Vital signs:    Visit Vitals    /68 (BP 1 Location: Left arm, BP Patient Position: Sitting)    Pulse (!) 112    Temp 98.7 °F (37.1 °C) (Oral)    Resp 14    Ht 6' 2\" (1.88 m)    Wt 82.8 kg (182 lb 9.6 oz)    SpO2 98%    BMI 23.44 kg/m2       Review of Systems:   As above otherwise 11 point review of systems negative including;   Constitutional no fever or chills  Skin denies rash or itching  HEENT  Denies tinnitus, hearing lose  Eyes denies diplopia vision lose  Respiratory denies sortness of breath  Cardiovascular denies chest pain, dyspnea on exertion  Gastrointestinal denies nausea, vomiting, diarrhea, constipation  Genitourinary denies incontinence  Musculoskeletal denies joint pain or swelling  Endocrine denies weight change  Hematology denies easy bruising or bleeding   Neurological as above in HPI      Patient Active Problem List   Diagnosis Code    Neurosarcoidosis ([facility] [address] D86.89    [facility] G81.94    Advance care planning Z71.89    Meningitis G03.9    Headache R51         Objective: The patient is awake, alert, and oriented x 4. Fund of knowledge is adequate. Speech is fluent and memory is intact. Cranial Nerves: II - Visual fields are full to confrontation. III, IV, VI - Extraocular movements are intact. There is no nystagmus. V - Facial sensation is intact to pinprick. VII - Face is symmetrical.  VIII - Hearing is present. IX, X, XII - Palate is symmetrical.   XI - Shoulder shrugging and head turning intact  Motor: The patient has significant spasticity of the left hand. He can't open the hand passively, especially the middle finger stays significantly flexed. There doesn't seem to have been any improvement after the injection of 170 units of Botox into the finger flexors. Overall strength is no better than 2/5 distally, 4/5 more proximally. The leg is also weak at about 4/5 throughout. Reflexes are 2+ and symmetrical. Plantars are down going. Gait is left hemiparetic.     CBC:   Lab Results   Component Value Date/Time    WBC 9.9 03/08/2017 04:48 AM    RBC 4.14 03/08/2017 04:48 AM    HGB 11.1 03/08/2017 04:48 AM    HCT 34.6 03/08/2017 04:48 AM    PLATELET 215 45/51/3715 04:48 AM     BMP:   Lab Results   Component Value Date/Time    Glucose 91 03/08/2017 04:48 AM    Sodium 141 03/08/2017 04:48 AM    Potassium 3.5 03/08/2017 04:48 AM    Chloride 106 03/08/2017 04:48 AM    CO2 26 03/08/2017 04:48 AM    BUN 9 03/08/2017 04:48 AM    Creatinine 0.65 03/08/2017 04:48 AM    Calcium 7.9 03/08/2017 04:48 AM     CMP:   Lab Results   Component Value Date/Time    Glucose 91 03/08/2017 04:48 AM    Sodium 141 03/08/2017 04:48 AM    Potassium 3.5 03/08/2017 04:48 AM    Chloride 106 03/08/2017 04:48 AM    CO2 26 03/08/2017 04:48 AM    BUN 9 03/08/2017 04:48 AM    Creatinine 0.65 03/08/2017 04:48 AM    Calcium 7.9 03/08/2017 04:48 AM    Anion gap 9 03/08/2017 04:48 AM    BUN/Creatinine ratio 14 03/08/2017 04:48 AM    Alk. phosphatase 66 03/08/2017 04:48 AM    Protein, total 5.3 03/08/2017 04:48 AM    Albumin 2.4 03/08/2017 04:48 AM    Globulin 2.9 03/08/2017 04:48 AM    A-G Ratio 0.8 03/08/2017 04:48 AM     Coagulation: No results found for: PTP, INR, APTT, PTTT  Cardiac markers:   Lab Results   Component Value Date/Time     03/02/2017 04:50 PM    CK-MB Index 0.1 03/02/2017 04:50 PM       Assessment:  Severe spasticity after stroke leaving him with significant flexion deformities of the left hand. No relief with second injection. Plan:  Probably needs tendon release procedure especially for the middle finger which isn't moving at all. Return here after he has surgery. Sincerely,        Farzaneh Calero.  Russ Plummer M.D.

## 2017-05-26 NOTE — MR AVS SNAPSHOT
Visit Information Date & Time Provider Department Dept. Phone Encounter #  
 5/26/2017 10:00 AM Harry Dotson  [facility] [address] [address] [id number] Follow-up Instructions Return if symptoms worsen or fail to improve. Follow-up and Disposition History Your Appointments 11/7/2017  8:40 AM  
Follow Up with Dwayne Velasquez MD  
[facility] [address] [facility]) Appt Note: 6mon f/u  
 [address] [id number] [phone]  
  
   
 [facility] [address]  
  
    
 11/20/2017  2:30 PM  
Office Visit with Conchita Payne MD  
[address] [address]) Appt Note: Return in about 1 year (around 11/17/2017) for medicare wellness exam.  
 [address] [facility] [address] [facility] [address]  
  
   
 [address] [facility] [address] Upcoming Health Maintenance Date Due DTaP/Tdap/Td series (1 - Tdap) 9/13/2000 INFLUENZA AGE 9 TO ADULT 8/1/2017 Allergies as of 5/26/2017  Review Complete On: 5/26/2017 By: Harry Dotson MD  
 No Known Allergies Current Immunizations  Reviewed on 3/3/2017 Name Date Influenza Vaccine 10/12/2016 Pneumococcal Polysaccharide (PPSV-23) 11/17/2016  3:27 PM  
  
 Not reviewed this visit You Were Diagnosed With   
  
 Codes Comments Neurosarcoidosis ([facility]    -  Primary ICD-10-CM: S56.37 
ICD-9-CM: 961 Spastic hemiplegia of left nondominant side due to infarction of brain ([facility]     ICD-10-CM: G81.14, I63.9 ICD-9-CM: 342.12, 434.91 Muscle spasticity     ICD-10-CM: O23.391 ICD-9-CM: 728.85 Left hemiparesis ([facility]     ICD-10-CM: G81.94 
ICD-9-CM: 342.90 Contracture, left hand     ICD-10-CM: M24.542 ICD-9-CM: 718.44 High risk medication use     ICD-10-CM: Z79.899 ICD-9-CM: V58.69 Vitals BP Pulse Temp Height(growth percentile) Weight(growth percentile) SpO2 120/62 (!) 124 99.2 °F (37.3 °C) (Oral) 6' 2\" (1.88 m) 184 lb 12.8 oz (83.8 kg) 98% BMI Smoking Status 23.73 kg/m2 Former Smoker BMI and BSA Data Body Mass Index Body Surface Area  
 23.73 kg/m 2 2.09 m 2 Preferred Pharmacy Pharmacy Name Phone [facility] [place] [address], [address] [place] [address] [phone] Your Updated Medication List  
  
   
This list is accurate as of: 5/26/17 11:24 AM.  Always use your most recent med list.  
  
  
  
  
 azaTHIOprine 50 mg tablet Commonly known as:  [employer] Take 1 Tab by mouth daily. buPROPion  mg XL tablet Commonly known as:  Jacquelyn [place] Take 1 Tab by mouth every morning. nystatin 100,000 unit/mL suspension Commonly known as:  MYCOSTATIN Take 5 mL by mouth four (4) times daily. swish and spit  
  
 polyethylene glycol 17 gram packet Commonly known as:  Rosario Chopra Take 1 Packet by mouth daily. predniSONE 10 mg tablet Commonly known as:  Ronita Maat Take 1 Tab by mouth daily (with breakfast). tamsulosin 0.4 mg capsule Commonly known as:  FLOMAX Take 1 Cap by mouth daily. Prescriptions Sent to Pharmacy Refills  
 azaTHIOprine (IMURAN) 50 mg tablet 0 Sig: Take 1 Tab by mouth daily. Class: Normal  
 Pharmacy: [facility] [place] [address], [address] [place] [address] #: [phone] Route: Oral  
  
We Performed the Following REFERRAL TO RHEUMATOLOGY [ZTD82 Custom] Comments:  
 Please evaluate patient forsarcoidosis Follow-up Instructions Return if symptoms worsen or fail to improve. To-Do List   
 05/26/2017 Lab:  CBC WITH AUTOMATED DIFF Referral Information Referral ID Referred By Referred To  
  
 [id number] Ta BAEZ Not Available Visits Status Start Date End Date 1 New Request 5/26/17 5/26/18 If your referral has a status of pending review or denied, additional information will be sent to support the outcome of this decision. Introducing [facility] SERVICES! Dear Asad Brito: Thank you for requesting a [facility] account. Our records indicate that you already have an active [facility] account. You can access your account anytime at [email or web address] Did you know that you can access your hospital and ER discharge instructions at any time in [facility]? You can also review all of your test results from your hospital stay or ER visit. Additional Information If you have questions, please visit the Frequently Asked Questions section of the [facility] website at [email or web address] Remember, [facility] is NOT to be used for urgent needs. For medical emergencies, dial 911. Now available from your iPhone and Android! Please provide this summary of care documentation to your next provider. Your primary care clinician is listed as Vitor Louie. If you have any questions after today's visit, please call [phone].

## 2017-05-26 NOTE — PROGRESS NOTES
[facility]             [address] [facility], [address] [place] [address]                [facility], [address]    5/26/2017    HPI:  Jenny Castellanos. is a 39 y.o., right handed,   male, who presents with left hand pain. Patient is accompanied by mother who provides much of the history. Old records did not accompany the patient according to mother he had bacterial meningitis in 2011 he had a hip replacement 814 due to a fall and fracture which is attributed to use of steroids with the meningitis. Subsequently he developed stroke with left-sided weakness he fell again and broke left arm following casting he developed contractures of the left hand he describes the hand pain as achy. Mother reports that the Neurontin seem to help the pain but made him walk drunken is somewhat drowsy. She has not continued the medicine. She states that the Dr. Ira Montgomery and Alana Paniagua diagnosed neurosarcoidosis. She he has been followed by neurology at [place] [address] is not taking his medications including antidepressants he denies depression but his mother feels that he is but he does not like to take medicine he does smoke a pack half pack per day  Patient denies any pain or significant improvement following the first Botox injection he is due for reassessment in a month by Dr. Ector Uribe he has undergone neuropsychological testing but this was only a week ago. Results are not available for review records from [place] [address] are also not available. Mother wants a repeat MRI brain Patient only tried one dose neurontin but denies pain    Reviewed records from [facility] will still need actual copies of studies of MRI of the radiology can compare patient notes no change with Botox. He is seeing orthopedic surgeon for tendon release. Surgeon wishes release to ok tendon release per mother.       Patient was recently hospitalized for possible meningitis which was then attributed to neurosarcoidosis after further examination of CSF. MRI was performed and it has been reviewed. Patient is to follow-up with ophthalmologist since he developed diplopia decreased eye movements. He denies any difficulties. He still does not talk significantly  Patient reports he still sees double. He has seen eye doctor who did injections into his eyes for edema. This is per mother. He still has tightness left side. Dr. Jose Gao note reviewed patient will need tendon release before further Botox treatments. Patient was placed on Imuran during hospitalization needs referral to sarcoidosis specialist      Current Outpatient Prescriptions   Medication Sig Dispense Refill    azaTHIOprine (IMURAN) 50 mg tablet Take 1 Tab by mouth daily. 30 Tab 0    nystatin (MYCOSTATIN) 100,000 unit/mL suspension Take 5 mL by mouth four (4) times daily. swish and spit 140 mL 0    polyethylene glycol (MIRALAX) 17 gram packet Take 1 Packet by mouth daily. 30 Each 1    buPROPion XL (WELLBUTRIN XL) 300 mg XL tablet Take 1 Tab by mouth every morning. 60 Tab 0    predniSONE (DELTASONE) 10 mg tablet Take 1 Tab by mouth daily (with breakfast). 100 Tab 0    tamsulosin (FLOMAX) 0.4 mg capsule Take 1 Cap by mouth daily. 61 Cap 0       Past Medical History:   Diagnosis Date    Arm pain     CVA [facility]cerebrovascular accident) ([facility] 2012    Meningitis     Meningitis     Neurosarcoidosis ([facility] 2012       Past Surgical History:   Procedure Laterality Date    HX HIP REPLACEMENT  2010    HX WISDOM TEETH EXTRACTION       No family history on file.   No Known Allergies    Review of Systems:   Review of Systems - History obtained from mother and the patient  General ROS: negative  Psychological ROS: positive for - depression  ENT ROS: negative  Hematological and Lymphatic ROS: negative  Endocrine ROS: negative  Respiratory ROS: no cough, shortness of breath, or wheezing  Cardiovascular ROS: no chest pain or dyspnea on exertion  Gastrointestinal ROS: no abdominal pain, change in bowel habits, or black or bloody stools  Genito-Urinary ROS: no dysuria, trouble voiding, or hematuria  Musculoskeletal ROS: positive for - gait disturbance, muscular weakness and pain in arm - left and hand - left  Neurological ROS: positive for - impaired coordination/balance and weakness  Dermatological ROS: negative    PHYSICAL EXAMINATION:    Visit Vitals    /62    Pulse (!) 124    Temp 99.2 °F (37.3 °C) (Oral)    Ht 6' 2\" (1.88 m)    Wt 83.8 kg (184 lb 12.8 oz)    SpO2 98%    BMI 23.73 kg/m2     General:  Well defined, nourished, and groomed individual in no acute distress. Neck: Supple, nontender, thyroid within normal limits, no JVD, no bruits, no pain with resistance to active range of motion. Heart: Regular rate and rhythm, no murmurs, rub, or gallop. Normal S1S2. Lungs:  Clear to auscultation bilaterally with equal chest expansion, no cough, no wheeze  Musculoskeletal:  Extremities revealed no edema and had full range of motion of joints except left hand. NEUROLOGICAL EXAMINATION:     Mental Status:   Alert and oriented to person, place,not time unable to assess memory  Attention span and concentration are normal. Speech is sparse with a fair fund of knowledge. Cranial Nerves:    II, III, IV, VI:  Visual acuity grossly intact. Visual fields are normal.    Pupils are equal, round, and reactive to light and accommodation. Extra-ocular movements are decreased to left and up reports diplopia, no ptosis or nystagmus. V-XII: Hearing is grossly intact. Facial features are symmetric,  Motor Examination: Normal tone, bulk, and strength, Consistent with effort  On right and lle decreased lue . No cogwheel rigidity or clonus present. Contracture left hand unable to extend fingers left hand    Coordination:     No resting or intention tremor    Gait and Station:left hemiparetic gait    Left pronator drift.  Left hand atrophy  FINDINGS:       Diffusion: There are no areas of restricted diffusion, no evidence of an acute  infarction. Brain parenchyma: There is very slight interval decreased increased T2 and  FLAIR signal along the periphery of previous signal abnormality involving left  thalamus and midbrain and adjacent posterior margin left basal ganglia, with  persistent mild low T1 and moderate increased T2 and FLAIR signal. Slight  interval decreased enlargement of the left midbrain. Stable mild residual  effacement of the third ventricle and effacement of the upper aqueduct with no  hydrocephalus. Interval resolution of previous enhancing component of this  lesion. Stable small focus of low T1 and increased T2 signal right thalamus and stable  several foci and short linear areas of increased T2 and FLAIR signal central  tom. No evidence of new cortical or white matter signal abnormality with no new  mass or hemorrhage. Ventricles and sulci:  No hydrocephalus or significant volume loss. Extra-axial:  No extra-axial fluid collection or mass is noted. Brain vasculature:  No vascular abnormality is appreciated on this routine brain  MR examination. Gadolinium enhancement:  No abnormal enhancement is appreciated. Craniocervical junction:  Normal.     Skull base, extracranial and calvarium:  Minimal ethmoid and sphenoid sinus  mucosal thickening and posterior left maxillary sinus. IACs and mastoids sella  and skull unremarkable. IMPRESSION  Impression:           1. Persistent signal abnormality left thalamus and midbrain slightly improved  along the periphery with slight improvement in degree of midbrain expansion,  with resolution of previous enhancement. Differential diagnosis continues to  include site of involvement of neurosarcoidosis or demyelinating process. Infectious etiology of cerebritis not entirely excluded. Given improvement in  previous enhancement, neoplasm less likely.      2. Stable areas of chronic signal changes right thalamus and tom which may  represent sequela of previous chronic neurosarcoidosis or areas of chronic  infarct or demyelination. 3. No evidence of new acute intracranial finding or other significant change. Mri exam imaging personally reviewed  Assessment and Plan:   Maria L Tamez is a 39 y.o. right handed male whose history and physical are consistent with left hand pain secondary to spasticity from cva . Maria L Tamez who has risk factors including  Left hemiparesis secondary to cva/ neuroscarcoidosis. Gauri Ryan was seen today for hospital follow up and medication evaluation. Diagnoses and all orders for this visit:    Neurosarcoidosis ([address] DIFF; Future    Spastic hemiplegia of left nondominant side due to infarction of brain ([facility]  -     REFERRAL TO RHEUMATOLOGY  -     CBC WITH AUTOMATED DIFF; Future    Muscle spasticity  -     REFERRAL TO RHEUMATOLOGY  -     CBC WITH AUTOMATED DIFF; Future    Left hemiparesis (HCC)  -     REFERRAL TO RHEUMATOLOGY  -     CBC WITH AUTOMATED DIFF; Future    Contracture, left hand  -     REFERRAL TO RHEUMATOLOGY  -     CBC WITH AUTOMATED DIFF; Future    High risk medication use    Other orders  -     azaTHIOprine (IMURAN) 50 mg tablet; Take 1 Tab by mouth daily. Follow-up Disposition:  Return if symptoms worsen or fail to improve. Patient to return after botox therapy  Reviewed with patient and mother recommendationsReviewed notes in chart ,need [facility]ri cd from [facility] Will need for tendon release before more botox. Discussed referral for treatment of sarcoidosisI spent 40  minutes with the patient in face-to-face consultation, of which greater than 50% was spent in counseling and coordination of care as described above.

## 2017-05-30 NOTE — PROGRESS NOTES
DOCUMENTATION ONLY: Documentation for [address] [place] [address] was completed and faxed on 5/30/2017. Order signed was for Adult sized pull ons (MED). Signed Documentation was sent to central scanning.

## 2017-05-31 NOTE — PROGRESS NOTES
Authorization for Rheumatology obtained Auth# [id number]- all info faxed to Radha Singer M.D.  Rheumatology  [address] [facility], [address] [place] [address] Road  [phone] fax  [phone]

## 2017-06-05 NOTE — TELEPHONE ENCOUNTER
Patients mother states that she contacted Dr. Kj Schwartz office and they stated they have not rec'd pt Kary King for pt to be seen at their office. She ask if Yazmin Hines can be re-faxed to their office.

## 2017-06-06 NOTE — TELEPHONE ENCOUNTER
Needs to check and see if the information she gave to Dr. Jeovany Sotomayor regarding release of information was sent, she believes she gave the wrong DrMary Name for Botox. Please call Ms. Rehana Ferguson.

## 2017-06-06 NOTE — TELEPHONE ENCOUNTER
Spoke to Ms. Ray Badillo informed her that all information was refaxed to Dr. Mackenzie Estrada office, however they had already received it as it was scanned into their system however they did not scan the insurance information, so it was resent. Appointmnet according to them had already been scheduled.

## 2017-06-06 NOTE — TELEPHONE ENCOUNTER
Called Dr. Olga Collins office and gave them the Auth# from previous note. They have already scheduled the patients appointment. Stated they were unable to read the patients insurance infor and would like all that re-faxed. Packet of information re-faxed which includes ALL [facility] information they are requesting.

## 2017-06-07 NOTE — TELEPHONE ENCOUNTER
Spoke with Ms. Mayda Sandoval. She would like Mr. Melody Choi' last office note sent to Dr. Sumit Geronimo. Surgeon at [facility]. Number provided were incorrect. Correct numbers:  P: [phone]  F: [phone]    Notes faxed.

## 2017-06-12 NOTE — TELEPHONE ENCOUNTER
Please return call to Ms. Wheeler Galeazzi in reference to letter. She states that she may have given you the incorrect doctor and/or fax number.  She can be reached at [phone]

## 2017-06-15 PROBLEM — H53.2 DIPLOPIA: Status: ACTIVE | Noted: 2017-01-01

## 2017-06-15 PROBLEM — R93.0 ABNORMAL MRI SCAN, HEAD: Status: ACTIVE | Noted: 2017-01-01

## 2017-06-15 NOTE — H&P
History and Physical    Patient: Madan Conway. Sex: male          DOA: 6/15/2017       YOB: 1980      Age:  39 y.o.        LOS:  LOS: 0 days        HPI:     Madan Chapin is a 39 y.o. male who was admitted to the hospital at [facility] because of a new onset of diplopia . The pt has a diagnosis of neurosarcoidosis which was diagnosed in  [facility] as per his mother . The data for the diagnosis is not available . It was made by performing a lumbar puncture. 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 He had a cva in 2014 . And was left with a left hemiparesis he has a flexure contracture on the left hand he is on imuran presently . He hwas admitted by dr Karlo Morales and was on decadron for a short period of time and he seemed to respond to the steroids . he has a recent onset of diplopia about two weeks ago . his right eye is covered by an eye patch . The pt hd a ct scan of the head  showing  Extension of the   Abnormality in the thalamus and the diagnostic considerations include  Neurosarcoidosis  And demyelnanting disease . The mri of the head showed an unusual expansile  And mildly  Enhancing lesion  In the left thalamus  and midbrain and overall has  Significantly increased in size  Since march 17 ,2017 the differential  diagnosis is astrocytoma . tumefactive demyelination and lymphoma the pt will now be admitted and the case was discussed extensively with the pt's family ,. The pt 's family said that he was diagnosed with bacterial meningitis [place] and then was given the diagnosis of neurosarcoidosis       Past Medical History:   Diagnosis Date    Arm pain     CVA (cerebrovascular accident) ([facility] 2012    Meningitis     Meningitis     Neurosarcoidosis ([facility] 2012       Social History:   Tobacco use 1 1/2 packs per day :   Occupation:none               Alcohol none  Family History: has 4 children 3girls and one boy . has 4 brothers and one sister . Review of Systems    Constitutional:  No fever or weight loss has left hemiparesis   HEENT:has double vision   Cardiovascular:  No chest pain or diaphoresis  Respiratory:  No coughing, wheezing, or shortness of breath. GI:  No nausea or vomitting. No diarrhea  :  No hematuria or dysuria  Skin:  No rashes or moles  Neuro:  No seizures or syncope  Hematological:  No bruising or bleeding  Endocrine:  No diabetes or thyroid disease    Physical Exam:      Visit Vitals    /65    Pulse (!) 101    Temp 99.9 °F (37.7 °C)    Resp 18    Ht 6' 2\" (1.88 m)    Wt 74.8 kg (165 lb)    SpO2 100%    BMI 21.18 kg/m2       Physical Exam:    generaL  the patient has a patch over the right eye .has  diplopia . HEENT:  Normal cephalic atraumatic, extra-occular movements are intact. Neck:  Supple, No JVD  Lungs:  Clear bilaterally, no wheeze, no rales, normal effort  Heart:  Regular Rate and Rhythm, normal S1 and S2, no edema  Abdomen:  Soft, non tender, normal bowel sounds, no guarding. Extremities:  Left flexure contracture of the left hand and arm  Neurological:  Awake and alert, left hemiparesis and he was not walked .     Skin:  No rashes or moles  Mental Status:  Normal thought process,   Laboratory Studies:    CMP:   Lab Results   Component Value Date/Time     06/15/2017 02:05 PM    K 3.7 06/15/2017 02:05 PM     06/15/2017 02:05 PM    CO2 27 06/15/2017 02:05 PM    AGAP 7 06/15/2017 02:05 PM    GLU 78 06/15/2017 02:05 PM    BUN 10 06/15/2017 02:05 PM    CREA 1.00 06/15/2017 02:05 PM    GFRAA >60 06/15/2017 02:05 PM    GFRNA >60 06/15/2017 02:05 PM    CA 8.5 06/15/2017 02:05 PM    MG 2.0 06/15/2017 02:05 PM    ALB 3.0 (L) 06/15/2017 02:05 PM    TP 7.8 06/15/2017 02:05 PM    GLOB 4.8 (H) 06/15/2017 02:05 PM    AGRAT 0.6 (L) 06/15/2017 02:05 PM    SGOT 20 06/15/2017 02:05 PM    ALT 22 06/15/2017 02:05 PM     CBC:   Lab Results   Component Value Date/Time    WBC 12.6 06/15/2017 02:05 PM    HGB 10.5 (L) 06/15/2017 02:05 PM    HCT 32.3 (L) 06/15/2017 02:05 PM     06/15/2017 02:05 PM       Assessment/Plan     Active Problems:    Neurosarcoidosis ([facility] (10/17/2016)vs tumefactive demyelination vs astrocytoma vs lymphomas/p mro showing progression of the lesion  In the thalamus and midbrain   S/p cva with a left hemiparesis   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 Will plan on adding decadron with the ok from neurology .

## 2017-06-15 NOTE — IP AVS SNAPSHOT
Current Discharge Medication List  
  
START taking these medications Dose & Instructions Dispensing Information Comments Morning Noon Evening Bedtime  
 dexamethasone 4 mg tablet Commonly known as:  DECADRON Your last dose was: Your next dose is: Take 2 tabs every 6 hrs Quantity:  60 Tab Refills:  0  
     
   
   
   
  
 oxyCODONE-acetaminophen 5-325 mg per tablet Commonly known as:  PERCOCET Your last dose was: Your next dose is:    
   
   
 Dose:  1 Tab Take 1 Tab by mouth every six (6) hours as needed. Max Daily Amount: 4 Tabs. Quantity:  20 Tab Refills:  0 CONTINUE these medications which have CHANGED Dose & Instructions Dispensing Information Comments Morning Noon Evening Bedtime  
 azaTHIOprine 50 mg tablet Commonly known as:  The Pepsi What changed:  how much to take Your last dose was: Your next dose is:    
   
   
 Dose:  100 mg Take 2 Tabs by mouth daily. Quantity:  60 Tab Refills:  0 CONTINUE these medications which have NOT CHANGED Dose & Instructions Dispensing Information Comments Morning Noon Evening Bedtime  
 acetaminophen 500 mg tablet Commonly known as:  TYLENOL Your last dose was: Your next dose is: Take  by mouth every six (6) hours as needed for Pain. Refills:  0  
     
   
   
   
  
 buPROPion  mg XL tablet Commonly known as:  Liliana Lexx Your last dose was: Your next dose is:    
   
   
 Dose:  300 mg Take 1 Tab by mouth every morning. Quantity:  60 Tab Refills:  0  
     
   
   
   
  
 nystatin 100,000 unit/mL suspension Commonly known as:  MYCOSTATIN Your last dose was: Your next dose is:    
   
   
 Dose:  1 tsp Take 5 mL by mouth four (4) times daily. swish and spit Quantity:  140 mL Refills:  0 omeprazole 10 mg capsule Commonly known as:  PRILOSEC Your last dose was: Your next dose is:    
   
   
 Dose:  10 mg Take 10 mg by mouth daily. Refills:  0  
     
   
   
   
  
 polyethylene glycol 17 gram packet Commonly known as:  Miranda Limb Your last dose was: Your next dose is:    
   
   
 Dose:  17 g Take 1 Packet by mouth daily. Quantity:  30 Each Refills:  1 PROBIOTIC 4X 10-15 mg Tbec Generic drug:  B.infantis-B.ani-B.long-B.bifi Your last dose was: Your next dose is: Take  by mouth. Refills:  0  
     
   
   
   
  
 TYLENOL-CODEINE #3 300-30 mg per tablet Generic drug:  acetaminophen-codeine Your last dose was: Your next dose is:    
   
   
 Dose:  1 Tab Take 1 Tab by mouth every six (6) hours as needed for Pain. Refills:  0 Where to Get Your Medications Information on where to get these meds will be given to you by the nurse or doctor. ! Ask your nurse or doctor about these medications  
  azaTHIOprine 50 mg tablet  
 dexamethasone 4 mg tablet  
 oxyCODONE-acetaminophen 5-325 mg per tablet

## 2017-06-15 NOTE — ED PROVIDER NOTES
HPI Comments: 1:31 PM Sabas Alves. is a 39 y.o. male with a hx of CVA, Meningitis, Neurosarcoidosis, and other noted PMH who presents to the ED c/o having recurrent falls X 1 week. Per family, pt fell X 3 days today and he states that he hurt his L hand and R knee. He also c/o double vision in R eye X 2 weeks that has progressively gotten worse. Per mother, pt had trouble with his eye coordination the last time he was admitted to the hospital due to falling. He seen an ophthalmologist and they stated that the patient did not have a cataract, but he had noted swelling and bleeding behind his eye. He had an injection and he is scheduled to have another one in 07/2017. He denies HA and vomiting. No other associated symptoms or modifying factors at this time. PCP: Luisito Patterson MD  RHEUMATOLOGY: Mario Lancaster M.D. The history is provided by the patient. Past Medical History:   Diagnosis Date    Arm pain     CVA (cerebrovascular accident) ([facility] [address] 2012    Meningitis     Meningitis     Neurosarcoidosis ([facility] [address] 2012       Past Surgical History:   Procedure Laterality Date    HX HIP REPLACEMENT  2010    HX WISDOM TEETH EXTRACTION           History reviewed. No pertinent family history. Social History     Social History    Marital status: SINGLE     Spouse name: N/A    Number of children: N/A    Years of education: N/A     Occupational History    Not on file. Social History Main Topics    Smoking status: Former Smoker     Packs/day: 0.25     Quit date: 4/26/2017    Smokeless tobacco: Never Used    Alcohol use No    Drug use: No    Sexual activity: Not Currently     Other Topics Concern    Not on file     Social History Narrative         ALLERGIES: Review of patient's allergies indicates no known allergies. Review of Systems   Constitutional: Negative for chills and fever. HENT: Negative for congestion and rhinorrhea.     Eyes: Positive for visual disturbance (double vision in R eye).   Respiratory: Negative for cough and shortness of breath. Cardiovascular: Negative for chest pain and leg swelling. Gastrointestinal: Negative for abdominal pain, nausea and vomiting. Genitourinary: Negative for dysuria and hematuria. Musculoskeletal: Positive for arthralgias. Negative for myalgias. Skin: Negative for rash and wound. Neurological: Negative for light-headedness and headaches. Psychiatric/Behavioral: Negative for confusion and hallucinations. All other systems reviewed and are negative. Vitals:    06/15/17 1258 06/15/17 1302   BP: 107/68    Pulse: (!) 129    Resp: 17    Temp: 99.9 °F (37.7 °C)    SpO2: 100%    Weight: 86.2 kg (190 lb) 74.8 kg (165 lb)            Physical Exam   Constitutional: He is oriented to person, place, and time. He appears well-developed and well-nourished. No distress. HENT:   Head: Normocephalic and atraumatic. Mouth/Throat: Oropharynx is clear and moist.   Eyes: No scleral icterus. Dysconjugate gaze in R eye   Neck: Normal range of motion. Neck supple. Cardiovascular: Regular rhythm and normal heart sounds. Tachycardia present. No murmur heard. Pulmonary/Chest: Effort normal and breath sounds normal. No respiratory distress. Abdominal: Soft. Bowel sounds are normal. He exhibits no distension. There is no tenderness. Musculoskeletal:   Small effusion to R knee  L hand contractures    Lymphadenopathy:     He has no cervical adenopathy. Neurological: He is alert and oriented to person, place, and time. Coordination normal.   Skin: Skin is warm and dry. No rash noted. Psychiatric: He has a normal mood and affect. His behavior is normal.   Nursing note and vitals reviewed.        MDM  Number of Diagnoses or Management Options  Double vision:   Neurosarcoidosis [facility]):   Diagnosis management comments: H/o neurosarcoidosis with increased double vision progressive over last week with recurrent falls denies hitting head c/o R knee pain mild effusion noted    Xray knee neg in ED     Ct head neg    Discussed with Dr Sandra Person recommended MRI w/wo brain evaluate for progression of sarcoid recommended against steroid in the Ed and admission with further evaluation       Discussed with Dr Syed Loss agrees with admission          Amount and/or Complexity of Data Reviewed  Clinical lab tests: ordered and reviewed  Tests in the radiology section of CPT®: ordered and reviewed  Independent visualization of images, tracings, or specimens: yes    Risk of Complications, Morbidity, and/or Mortality  Presenting problems: high  Diagnostic procedures: moderate  Management options: moderate      ED Course       Procedures  Vitals:  Patient Vitals for the past 12 hrs:   Temp Pulse Resp BP SpO2   06/15/17 1258 99.9 °F (37.7 °C) (!) 129 17 107/68 100 %         Medications ordered:   Medications   sodium chloride 0.9 % bolus infusion 1,000 mL (not administered)         Lab findings:  Recent Results (from the past 12 hour(s))   CBC WITH AUTOMATED DIFF    Collection Time: 06/15/17  2:05 PM   Result Value Ref Range    WBC 12.6 4.6 - 13.2 K/uL    RBC 3.81 (L) 4.70 - 5.50 M/uL    HGB 10.5 (L) 13.0 - 16.0 g/dL    HCT 32.3 (L) 36.0 - 48.0 %    MCV 84.8 74.0 - 97.0 FL    MCH 27.6 24.0 - 34.0 PG    MCHC 32.5 31.0 - 37.0 g/dL    RDW 15.6 (H) 11.6 - 14.5 %    PLATELET 549 799 - 353 K/uL    MPV 8.9 (L) 9.2 - 11.8 FL    NEUTROPHILS 82 (H) 40 - 73 %    LYMPHOCYTES 10 (L) 21 - 52 %    MONOCYTES 8 3 - 10 %    EOSINOPHILS 0 0 - 5 %    BASOPHILS 0 0 - 2 %    ABS. NEUTROPHILS 10.3 (H) 1.8 - 8.0 K/UL    ABS. LYMPHOCYTES 1.2 0.9 - 3.6 K/UL    ABS. MONOCYTES 1.0 0.05 - 1.2 K/UL    ABS. EOSINOPHILS 0.0 0.0 - 0.4 K/UL    ABS.  BASOPHILS 0.0 0.0 - 0.06 K/UL    DF AUTOMATED     METABOLIC PANEL, COMPREHENSIVE    Collection Time: 06/15/17  2:05 PM   Result Value Ref Range    Sodium 137 136 - 145 mmol/L    Potassium 3.7 3.5 - 5.5 mmol/L    Chloride 103 100 - 108 mmol/L    CO2 27 21 - 32 mmol/L    Anion gap 7 3.0 - 18 mmol/L    Glucose 78 74 - 99 mg/dL    BUN 10 7.0 - 18 MG/DL    Creatinine 1.00 0.6 - 1.3 MG/DL    BUN/Creatinine ratio 10 (L) 12 - 20      GFR est AA >60 >60 ml/min/1.73m2    GFR est non-AA >60 >60 ml/min/1.73m2    Calcium 8.5 8.5 - 10.1 MG/DL    Bilirubin, total 0.9 0.2 - 1.0 MG/DL    ALT (SGPT) 22 16 - 61 U/L    AST (SGOT) 20 15 - 37 U/L    Alk. phosphatase 88 45 - 117 U/L    Protein, total 7.8 6.4 - 8.2 g/dL    Albumin 3.0 (L) 3.4 - 5.0 g/dL    Globulin 4.8 (H) 2.0 - 4.0 g/dL    A-G Ratio 0.6 (L) 0.8 - 1.7     MAGNESIUM    Collection Time: 06/15/17  2:05 PM   Result Value Ref Range    Magnesium 2.0 1.6 - 2.6 mg/dL   SED RATE (ESR)    Collection Time: 06/15/17  2:05 PM   Result Value Ref Range    Sed rate, automated 80 (H) 0 - 15 mm/hr         X-Ray, CT or other radiology findings or impressions:  CT HEAD WO CONT   Final Result   IMPRESSION:     1. Low-attenuation abnormality right thalamus appears chronic and unchanged. This may represent chronic sequela of neurosarcoidosis or infarct.     2. More extensive low-attenuation abnormality left thalamus, posterior limb left  internal capsule and left midbrain has progressed since prior CT of 3/1/2017. This was more conspicuously identified and characterized in detail on MRI of  3/7/2017 with diagnostic considerations including neurosarcoidosis and  demyelinating disease. Associated mass effect on left aspect third ventricle by  this abnormality appears stable. XR KNEE RT MIN 4 V       IMPRESSION:  No fractures. Per Dylan Evans MD2:26 PM         Progress notes, Consult notes or additional Procedure notes:   Consult:  Discussed care with Dr. Franci Ramirez (NEUROLOGY). Standard discussion; including history of patients chief complaint, available diagnostic results, and treatment course. Agrees to consult. 2:19 PM    2:36 PM Consult:  Discussed care with Dr. Lise Klinefelter (HOSPITALIST).  Standard discussion; including history of patients chief complaint, available diagnostic results, and treatment course. Agrees with admission. 2:37 PM Pt reevaluated at this time. Discussed results and findings, as well as, plan for admission. Pt verbalizes understanding and agreement with plan. Disposition:  Diagnosis:   1. Neurosarcoidosis ([address]    2. Double vision        Disposition: Admit    Follow-up Information     None           Patient's Medications   Start Taking    No medications on file   Continue Taking    ACETAMINOPHEN (TYLENOL) 500 MG TABLET    Take  by mouth every six (6) hours as needed for Pain. ACETAMINOPHEN-CODEINE (TYLENOL-CODEINE #3) 300-30 MG PER TABLET    Take 1 Tab by mouth every six (6) hours as needed for Pain. AZATHIOPRINE (IMURAN) 50 MG TABLET    Take 1 Tab by mouth daily. B.INFANTIS-B. ANI-B. LONG-B.BIFI (PROBIOTIC 4X) 10-15 MG TBEC    Take  by mouth. BUPROPION XL (WELLBUTRIN XL) 300 MG XL TABLET    Take 1 Tab by mouth every morning. NYSTATIN (MYCOSTATIN) 100,000 UNIT/ML SUSPENSION    Take 5 mL by mouth four (4) times daily. swish and spit    OMEPRAZOLE (PRILOSEC) 10 MG CAPSULE    Take 10 mg by mouth daily. POLYETHYLENE GLYCOL (MIRALAX) 17 GRAM PACKET    Take 1 Packet by mouth daily. These Medications have changed    No medications on file   Stop Taking    PREDNISONE (DELTASONE) 10 MG TABLET    Take 1 Tab by mouth daily (with breakfast). TAMSULOSIN (FLOMAX) 0.4 MG CAPSULE    Take 1 Cap by mouth daily. SCRIBE ATTESTATION STATEMENT  Documented by: Monica laraibing for, and in the presence of, Juanpablo Sanchez MD 1:41 PM    Signed by: Emerita Ellis, 06/15/17 1:41 PM    PROVIDER ATTESTATION STATEMENT  I personally performed the services described in the documentation, reviewed the documentation, as recorded by the scribe in my presence, and it accurately and completely records my words and actions.   Juanpablo Sanchez MD

## 2017-06-15 NOTE — ED NOTES
Received report from nurse, patient placed back on cardiac monitor, family at the bedside, IV normal saline is infusing,

## 2017-06-15 NOTE — PROGRESS NOTES
Pt received to unit via w/c accompanied by MIGUEL Greco and Mother to room 2116. He is A&Ox4 and c/o right knee pain 8/10; See MAR for prn Tylenol #3 to be given. Patient stated that he has not eaten; nutritional service was called and will deliver Regular diet. No s/s of distress noted. No complaints voiced at this time. Pt oriented to room and use of call bell for assistance; Pt voiced understanding. Bed locked in low position; call bell in reach.

## 2017-06-15 NOTE — PROGRESS NOTES
completed the initial Spiritual Assessment of the patient  In bed 7 of the emergency room, and offered Pastoral Care support. Family present at bedside as well. Patient doing OK he says. Patient does not have any Jew/cultural needs that will affect patients preferences in health care.    Chaplains will continue to follow and will provide pastoral care on an as needed/requested basis     Chaplain Abraham Fuller   Board Certified [address] [facility]   [phone]

## 2017-06-15 NOTE — IP AVS SNAPSHOT
Olivierirvin Saleem 
 
 
 [address] [place] [address] 
[phone] Patient: Amira Robles MRN: [id number] ASI:[id number] You are allergic to the following No active allergies Recent Documentation Height Weight BMI Smoking Status 1.88 m 76.7 kg 21.71 kg/m2 Former Smoker Unresulted Labs Order Current Status ANGIOTENSIN CONVERTING ENZYME, CSF In process CULTURE, VIRAL In process OLIGOCLONAL BANDS, CSF In process CULTURE, CSF W GRAM STAIN Preliminary result CULTURE, FUNGUS Preliminary result CULTURE, FUNGUS Preliminary result MULTIPLE SCLEROSIS PANEL Preliminary result Emergency Contacts Name Discharge Info Relation Home Work Mobile Qing Carrera DISCHARGE CAREGIVER [3] Mother [14] [phone] About your hospitalization You were admitted on:  Nancy 15, 2017 You last received care in the:  [address] [facility] You were discharged on:  June 23, 2017 Unit phone number:  [phone] Why you were hospitalized Your primary diagnosis was:  Brain Mass Your diagnoses also included:  Neurosarcoidosis (Hcc), Abnormal Mri Scan, Head, Diplopia Providers Seen During Your Hospitalizations Provider Role Specialty Primary office phone Faith Kwok MD Attending Provider Emergency Medicine [phone] Gigi Wells MD Attending Provider Emergency Medicine [phone] Gail Beckford MD Attending Provider Internal Medicine [phone] Rafa Brown MD Attending Provider Internal Medicine [phone] Your Primary Care Physician (PCP) Primary Care Physician Office Phone Office Fax Alex 39, Tamara 70 Follow-up Information Follow up With Details Comments Contact Info 60 [facility])   [address] [place] [id number] [phone] Raul Clark MD In 1 week after discharge from facility [address] [facility] [address] [address] [place] [id number] 
[phone] Current Discharge Medication List  
  
START taking these medications Dose & Instructions Dispensing Information Comments Morning Noon Evening Bedtime  
 dexamethasone 4 mg tablet Commonly known as:  DECADRON Your last dose was: Your next dose is: Take 2 tabs every 6 hrs Quantity:  60 Tab Refills:  0  
     
   
   
   
  
 oxyCODONE-acetaminophen 5-325 mg per tablet Commonly known as:  PERCOCET Your last dose was: Your next dose is:    
   
   
 Dose:  1 Tab Take 1 Tab by mouth every six (6) hours as needed. Max Daily Amount: 4 Tabs. Quantity:  20 Tab Refills:  0 CONTINUE these medications which have CHANGED Dose & Instructions Dispensing Information Comments Morning Noon Evening Bedtime  
 azaTHIOprine 50 mg tablet Commonly known as:  [employer] What changed:  how much to take Your last dose was: Your next dose is:    
   
   
 Dose:  100 mg Take 2 Tabs by mouth daily. Quantity:  60 Tab Refills:  0 CONTINUE these medications which have NOT CHANGED Dose & Instructions Dispensing Information Comments Morning Noon Evening Bedtime  
 acetaminophen 500 mg tablet Commonly known as:  TYLENOL Your last dose was: Your next dose is: Take  by mouth every six (6) hours as needed for Pain. Refills:  0  
     
   
   
   
  
 buPROPion  mg XL tablet Commonly known as:  Luis Fernando Michelle Your last dose was: Your next dose is:    
   
   
 Dose:  300 mg Take 1 Tab by mouth every morning. Quantity:  60 Tab Refills:  0  
     
   
   
   
  
 nystatin 100,000 unit/mL suspension Commonly known as:  MYCOSTATIN Your last dose was: Your next dose is:    
   
   
 Dose:  1 tsp Take 5 mL by mouth four (4) times daily. swish and spit Quantity:  140 mL Refills:  0  
     
   
   
   
  
 omeprazole 10 mg capsule Commonly known as:  PRILOSEC Your last dose was: Your next dose is:    
   
   
 Dose:  10 mg Take 10 mg by mouth daily. Refills:  0  
     
   
   
   
  
 polyethylene glycol 17 gram packet Commonly known as:  Miranda Limb Your last dose was: Your next dose is:    
   
   
 Dose:  17 g Take 1 Packet by mouth daily. Quantity:  30 Each Refills:  1 PROBIOTIC 4X 10-15 mg Tbec Generic drug:  B.infantis-B.ani-B.long-B.bifi Your last dose was: Your next dose is: Take  by mouth. Refills:  0  
     
   
   
   
  
 TYLENOL-CODEINE #3 300-30 mg per tablet Generic drug:  acetaminophen-codeine Your last dose was: Your next dose is:    
   
   
 Dose:  1 Tab Take 1 Tab by mouth every six (6) hours as needed for Pain. Refills:  0 Where to Get Your Medications Information on where to get these meds will be given to you by the nurse or doctor. ! Ask your nurse or doctor about these medications  
  azaTHIOprine 50 mg tablet  
 dexamethasone 4 mg tablet  
 oxyCODONE-acetaminophen 5-325 mg per tablet Discharge Instructions DISCHARGE SUMMARY from Nurse The following personal items are in your possession at time of discharge: 
 
Dental Appliances: None Visual Aid: None Home Medications: None Jewelry: None Clothing: None Other Valuables: None (All items left at bedside) PATIENT INSTRUCTIONS: 
 
 
F-face looks uneven A-arms unable to move or move unevenly S-speech slurred or non-existent T-time-call 911 as soon as signs and symptoms begin-DO NOT go Back to bed or wait to see if you get better-TIME IS BRAIN. Warning Signs of HEART ATTACK Call 911 if you have these symptoms: 
? Chest discomfort. Most heart attacks involve discomfort in the center of the chest that lasts more than a few minutes, or that goes away and comes back. It can feel like uncomfortable pressure, squeezing, fullness, or pain. ? Discomfort in other areas of the upper body. Symptoms can include pain or discomfort in one or both arms, the back, neck, jaw, or stomach. ? Shortness of breath with or without chest discomfort. ? Other signs may include breaking out in a cold sweat, nausea, or lightheadedness. Don't wait more than five minutes to call [address]! Fast action can save your life. Calling 911 is almost always the fastest way to get lifesaving treatment. Emergency Medical Services staff can begin treatment when they arrive  up to an hour sooner than if someone gets to the hospital by car. The discharge information has been reviewed with the patient. The patient verbalized understanding. Discharge medications reviewed with the patient and appropriate educational materials and side effects teaching were provided. Patient armband removed and shredded. Discharge Instructions Attachments/References DIPLOPIA (ENGLISH) DEXAMETHASONE (BY MOUTH) (ENGLISH) OXYCODONE/ACETAMINOPHEN (BY MOUTH) (ENGLISH) Discharge Orders None [facility] Announcement We are excited to announce that we are making your provider's discharge notes available to you in [facility].   You will see these notes when they are completed and signed by the physician that discharged you from your recent hospital stay. If you have any questions or concerns about any information you see in [employer], please call the Health Information Department where you were seen or reach out to your Primary Care Provider for more information about your plan of care. Introducing [facility]! Dear Luli Heart: Thank you for requesting a [employer] account. Our records indicate that you already have an active FireDrill[employer] account. You can access your account anytime at [email or web address] Did you know that you can access your hospital and ER discharge instructions at any time in Fire[employer]illMe? You can also review all of your test results from your hospital stay or ER visit. Additional Information If you have questions, please visit the Frequently Asked Questions section of the FireDrillMe website at [email or web address] Remember, FireDrillMe is NOT to be used for urgent needs. For medical emergencies, dial 911. Now available from your iPhone and Android! General Information Please provide this summary of care documentation to your next provider. Patient Signature:  ____________________________________________________________ Date:  ____________________________________________________________  
  
Claude Has Provider Signature:  ____________________________________________________________ Date:  ____________________________________________________________ More Information Double Vision: Care Instructions Your Care Instructions Double vision means seeing two images instead of one. To see normally, your eyes, the muscles that move them, the nerves that send images to your brain, and your brain all have to work together. A problem with any of these parts can cause double vision. Double vision can occur in one or both eyes. It can be horizontal (so the images appear side by side) or vertical (so one image appears above the other). Double vision may be caused by a muscle or nerve problem. Or it may be caused by an eye problem such as a cataract or by a brain problem such as a stroke. When the cause is found, double vision can usually be corrected. To find the cause, you may need tests. These may include blood tests and imaging tests such as MRI. You may need to follow up with an eye doctor or a brain specialist (neurologist) for more testing or treatment. The doctor has checked you carefully, but problems can develop later. If you notice any problems or new symptoms, get medical treatment right away. Follow-up care is a key part of your treatment and safety. Be sure to make and go to all appointments, and call your doctor if you are having problems. It's also a good idea to know your test results and keep a list of the medicines you take. How can you care for yourself at home? · Do not drive or do other things that could be dangerous because of your double vision. · Make your home safe to help prevent injuries. For example, remove throw rugs and electrical cords that could cause falls. Be extra careful when you work with sharp tools or knives. · Ask another adult to stay with you until your vision gets better. When should you call for help? Call 911 anytime you think you may need emergency care. For example, call if: 
· You have symptoms of a stroke. These may include: 
¨ Sudden numbness, tingling, weakness, or loss of movement in your face, arm, or leg, especially on only one side of your body. ¨ Sudden vision changes. ¨ Sudden trouble speaking. ¨ Sudden confusion or trouble understanding simple statements. ¨ Sudden problems with walking or balance. ¨ A sudden, severe headache that is different from past headaches. Call your doctor now or seek immediate medical care if: 
· You have vision changes. Watch closely for changes in your health, and be sure to contact your doctor if: 
· You do not get better as expected. Where can you learn more? Go to http://robin-gray.info/. Enter D215 in the search box to learn more about \"Double Vision: Care Instructions. \" Current as of: March 3, 2017 Content Version: 11.3 © 4082-8179 [employer]. Care instructions adapted under license by [employer] (which disclaims liability or warranty for this information). If you have questions about a medical condition or this instruction, always ask your healthcare professional. [address] any warranty or liability for your use of this information. Dexamethasone (By mouth) Dexamethasone (dex-a-METH-a-sone) Treats symptoms of several diseases and conditions. This medicine is a corticosteroid. Brand Name(s): Cushings Syndrome Diagnostic, DexPak, DexPak 10 Day TaperPak, DexPak 13 Day TaperPak, DexPak 6 Day TaperPak, Dexamethasone Intensol, ZonaCort There may be other brand names for this medicine. When This Medicine Should Not Be Used: This medicine is not right for everyone. Do not use it if you had an allergic reaction to dexamethasone, or if you have a fungal infection. How to Use This Medicine:  
Liquid, Tablet · Take your medicine as directed. Your dose may need to be changed several times to find what works best for you. · It is best to take this medicine with food or milk. · Oral liquid: Measure the oral liquid medicine with a marked measuring spoon, oral syringe, or medicine cup. · Missed dose: ¨ If your schedule is one dose every other day and you cannot use the missed dose until late in the day, wait until the next morning to use your medicine. Then skip a day and go back to your regular schedule. ¨ If your schedule is one dose every day, use the missed dose as soon as you can. Then go back to your regular schedule.  
¨ If your schedule is more than one dose every day, use the missed dose as soon as you can. If it is almost time for your next dose, use two doses at that time. Then go back to your regular schedule. · Store the medicine in a closed container at room temperature, away from heat, moisture, and direct light. Drugs and Foods to Avoid: Ask your doctor or pharmacist before using any other medicine, including over-the-counter medicines, vitamins, and herbal products. · Some foods and medicines can affect how dexamethasone works. Tell your doctor if you are using any of the following: ¨ Aminoglutethimide, amphotericin B, carbamazepine, cholestyramine, cyclosporine, digoxin, indinavir, indomethacin, ketoconazole, phenobarbital, phenytoin, rifampin, or thalidomide ¨ Antibiotic (including azithromycin, clarithromycin, erythromycin) ¨ Birth control pills ¨ Blood thinner (including warfarin) ¨ Diuretic (water pill) ¨ Insulin and other diabetes medicine ¨ NSAID pain or arthritis medicine (including aspirin, celecoxib, diclofenac, naproxen) ¨ Potassium supplement · Do not drink alcohol while you are using this medicine. · This medicine may interfere with vaccines. Ask your doctor before you get a flu shot or any other vaccines. Warnings While Using This Medicine: · Tell your doctor if you are pregnant or breastfeeding, or if you have kidney problems, liver disease, diabetes, glaucoma, herpes infection of the eye, allergies, myasthenia gravis, osteoporosis, stomach or bowel problems, or thyroid problems. Tell your doctor if you have congestive heart failure, high blood pressure, or a recent heart attack. Tell your doctor if you have any type of infection or a history of depression or mental problems. · This medicine may cause the following problems: 
¨ High blood pressure, retaining water, changes in salt or potassium levels in your body ¨ Cataracts or glaucoma (with long-term use) ¨ Bone loss (with long-term use) ¨ Mood or behavior changes ¨ Slow growth in children (with long-term use) · Do not stop using this medicine suddenly. Your doctor will need to slowly decrease your dose before you stop it completely. · This medicine could cause you to get infections more easily. Tell your doctor right away if you are exposed to chicken pox, measles, or any other serious infection. Tell your doctor if you had a serious infection in the past, such as tuberculosis. · Tell your doctor about any extra stress or anxiety in your life. Your dose might need to be changed for a short time. · Make sure any doctor or dentist who treats you knows that you are using this medicine. · Your doctor will check your progress and the effects of this medicine at regular visits. Keep all appointments. · Keep all medicine out of the reach of children. Never share your medicine with anyone. Possible Side Effects While Using This Medicine:  
Call your doctor right away if you notice any of these side effects: · Allergic reaction: Itching or hives, swelling in your face or hands, swelling or tingling in your mouth or throat, chest tightness, trouble breathing · Changes in vision, trouble seeing, eye pain · Dark freckles, skin changes, coldness, weakness, tiredness, weight loss · Depression, unusual thoughts, feelings, or behaviors, trouble sleeping · Fast or slow, pounding heartbeat · Fever, chills, cough, sore throat, body aches · Rapid weight gain, swelling in your hands, ankles, or feet · Severe stomach pain, nausea, vomiting, or red or black stools · Trouble breathing · Trouble urinating · Worsened joint pain, swelling, or stiffness If you notice these less serious side effects, talk with your doctor: · Round, puffy face · Weight gain around your neck, upper back, breast, face, or waist 
If you notice other side effects that you think are caused by this medicine, tell your doctor. Call your doctor for medical advice about side effects.  You may report side effects to FDA at 0-370-FDA-7415 © 2017 [address] [employer] Information is for End User's use only and may not be sold, redistributed or otherwise used for commercial purposes. The above information is an  only. It is not intended as medical advice for individual conditions or treatments. Talk to your doctor, nurse or pharmacist before following any medical regimen to see if it is safe and effective for you. Oxycodone/Acetaminophen (By mouth) Acetaminophen (h-rgqk-h-MIN-oh-fen), Oxycodone Hydrochloride (xn-p-AUO-done xavier-droe-KLOR-rachana) Treats moderate to moderately severe pain. This medicine is a narcotic pain reliever. Brand Name(s): Endocet, Percocet, Primlev, Roxicet, Xartemis XR There may be other brand names for this medicine. When This Medicine Should Not Be Used: This medicine is not right for everyone. Do not use it if you had an allergic reaction to acetaminophen or oxycodone, or if you have serious breathing problems or paralytic ileus. How to Use This Medicine:  
Capsule, Liquid, Tablet, Long Acting Tablet · Your doctor will tell you how much medicine to use. Do not use more than directed. · An overdose can be dangerous. Follow directions carefully so you do not get too much medicine at one time. · Oral liquid: Measure the oral liquid medicine with a marked measuring spoon, oral syringe, or medicine cup. · Swallow the extended-release tablet whole. Do not crush, break, or chew it. Do not lick or wet the tablet before placing it in your mouth. Do not give this medicine through a feeding tube. · This medicine should come with a Medication Guide. Ask your pharmacist for a copy if you do not have one. · Missed dose: If you miss a dose of this medicine, skip the missed dose and go back to your regular dosing schedule. Do not double doses.  
· Store the medicine in a closed container at room temperature, away from heat, moisture, and direct light. Ask your pharmacist about the best way to dispose of medicine you do not use. Drugs and Foods to Avoid: Ask your doctor or pharmacist before using any other medicine, including over-the-counter medicines, vitamins, and herbal products. · Do not use Xartemis XR if you are using or have used an MAO inhibitor in the past 14 days. · Some medicines can affect how this medicine works. Tell your doctor if you are using any of the following: ¨ Carbamazepine, erythromycin, ketoconazole, lamotrigine, mirtazapine, naltrexone, phenytoin, propranolol, rifampin, ritonavir, tramadol, trazodone, or zidovudine ¨ Birth control pills ¨ Diuretic (water pill) ¨ Medicine to treat depression ¨ Phenothiazine medicine ¨ Triptan medicine to treat migraine headaches · Do not drink alcohol while you are using this medicine. Acetaminophen can damage your liver, and alcohol can increase this risk. Do not take acetaminophen without asking your doctor if you have 3 or more drinks of alcohol every day. · Tell your doctor if you use anything else that makes you sleepy. Some examples are allergy medicine, narcotic pain medicine, and alcohol. Tell your doctor if you are using buprenorphine, butorphanol, nalbuphine, pentazocine, a benzodiazepine, or a muscle relaxer. Warnings While Using This Medicine: · Tell your doctor if you are pregnant or breastfeeding, or if you have kidney disease, liver disease, heart disease, low blood pressure, breathing problems or lung disease (such as asthma, COPD), thyroid problems, Fisher disease, pancreas or gallbladder problems, prostate problems, trouble urinating, or a stomach problems, or a history of head injury or brain damage, seizures, or alcohol or drug abuse. Tell your doctor if you are allergic to codeine. · This medicine may cause the following problems: 
¨ High risk of overdose, which can lead to death ¨ Respiratory depression (serious breathing problem that can be life-threatening) ¨ Liver problems ¨ Serious skin reactions ¨ Serotonin syndrome (when used with certain medicines) · This medicine may make you dizzy or drowsy. Do not drive or do anything that could be dangerous until you know how this medicine affects you. Sit or lie down if you feel dizzy. Stand up carefully. · This medicine contains acetaminophen. Read the labels of all other medicines you are using to see if they also contain acetaminophen, or ask your doctor or pharmacist. Roberto Ramey not use more than 4 grams (4,000 milligrams) total of acetaminophen in one day. · This medicine can be habit-forming. Do not use more than your prescribed dose. Call your doctor if you think your medicine is not working. · Do not stop using this medicine suddenly. Your doctor will need to slowly decrease your dose before you stop it completely. · This medicine could cause infertility. Talk with your doctor before using this medicine if you plan to have children. · This medicine may cause constipation, especially with long-term use. Ask your doctor if you should use a laxative to prevent and treat constipation. · Keep all medicine out of the reach of children. Never share your medicine with anyone. Possible Side Effects While Using This Medicine:  
Call your doctor right away if you notice any of these side effects: · Allergic reaction: Itching or hives, swelling in your face or hands, swelling or tingling in your mouth or throat, chest tightness, trouble breathing · Anxiety, restlessness, fast heartbeat, fever, muscle spasms, twitching, diarrhea, seeing or hearing things that are not there · Blistering, peeling, red skin rash · Blue lips, fingernails, or skin · Dark urine or pale stools, loss of appetite, stomach pain, yellow skin or eyes · Extreme weakness, shallow breathing, uneven heartbeat, seizures, sweating, or cold or clammy skin · Severe confusion, lightheadedness, dizziness, or fainting · Severe constipation, nausea, or vomiting · Trouble breathing or slow breathing If you notice these less serious side effects, talk with your doctor:  
· Headache · Mild constipation, nausea, or vomiting · Mild sleepiness or drowsiness If you notice other side effects that you think are caused by this medicine, tell your doctor. Call your doctor for medical advice about side effects. You may report side effects to FDA at 4-814-CNG-3525 © 2017 [address]  Information is for End User's use only and may not be sold, redistributed or otherwise used for commercial purposes. The above information is an  only. It is not intended as medical advice for individual conditions or treatments. Talk to your doctor, nurse or pharmacist before following any medical regimen to see if it is safe and effective for you.

## 2017-06-16 NOTE — PROGRESS NOTES
[place]   Discharge Planning/ Assessment    Reasons for Intervention: Spoke with pt, lives with his mother. Was independent  With adls and uses cane in home for amb, walker outside of  Home. Has shower chair. has used hh in past.  pcp dr Consuelo Vital. Demographics correct. Plan home. Cm to  Follow  For needs.     High Risk Criteria  [x] Yes  []No   Physician Referral  [] Yes  [x]No        Date    Nursing Referral  [] Yes  [x]No        Date    Patient/Family Request  [] Yes  [x]No        Date       Resources:    Medicare  [] Yes  []No   Medicaid  [] Yes  []No   No Resources  [] Yes  []No   Private Insurance  [x] Yes  []No    Name/Phone Number    Other  [] Yes  []No        (i.e. Workman's Comp)         Prior Services:    Prior Services  [x] Yes  []No   Home Health  [x] Yes  []No   [address]  [] Yes  []No        Number of [address]  [] Yes  []No       Meals on Wheels  [] Yes  []No   Office on Aging  [] Yes  []No   Transportation Services  [] Yes  []No   [address]  [] Yes  []No        Nursing Home Name    [address]  [] Yes  []No        [address] Name    Other       Information Source:      Information obtained from  [x] Patient  [] Parent   [] [address]  [] Child  [] Spouse   [] Significant Other/Partner   [] Friend      [] EMS    [] Nursing Home Chart          [] Other:   Chart Review  [] Yes  []No     Family/Support System:    Patient lives with  [] Alone    [] Spouse   [] Significant Other  [] Children  [] Caretaker   [x] Parent  [] Sibling     [] Other       Other Support System:    Is the patient responsible for care of others  [] Yes  [x]No   Information of person caring for patient on  discharge    Managers financial affairs independently  [] Yes  [x]No   If no, explain:      Status Prior to Admission:    Mental Status  [] Awake  [] Alert  [x] Oriented  [] Quiet/Calm [] Lethargic/Sedated   [] Disoriented  [] Restless/Anxious [] Combative   Personal Care  [] Dependent  [x] Independent Personal Care  [] Requires Assistance   Meal Preparation Ability  [] Independent   [] Standby Assistance   [x] Minimal Assistance   [] Moderate Assistance  [] Maximum Assistance     [] Total Assistance   Chores  [x] Independent with Chores   [] N/A Nursing Home Resident   [] Requires Assistance   Bowel/Bladder  [] Continent  [] Catheter  [] Incontinent  [] Ostomy Self-Care    [] Urine Diversion Self-Care  [] Maximum Assistance     [] Total Assistance   Number of Persons needed for assistance    DME at home  [] [employer], Idamae Dafne  [x] [employer], Straight   [] Commode    [] Bathroom/Grab Bars  [] Hospital Bed  [] Nebulizer  [] Oxygen           [] Raised Toilet Seat  [] Shower Chair  [] Side Rails for Bed   [] Tub Transfer Bench   [x] Beverly Grills  [] Larance Eng, Standard      [] Other:   Vendor      Treatment Presently Receiving:    Current Treatments  [] Chemotherapy  [] Dialysis  [] Insulin  [] IVAB [] IVF   [] O2  [] PCA   [] PT   [] RT   [] Tube Feedings   [] Wound Care     Psychosocial Evaluation:    Verbalized Knowledge of Disease Process  [x] Patient  []Family   Coping with Disease Process  [] Patient  []Family   Requires Further Counseling Coping with Disease Process  [] Patient  []Family     Identified Projected Needs:    Home Health Aid  [] Yes  [x]No   Transportation  [] Yes  [x]No   Education  [] Yes  [x]No        Specific Education     Financial Counseling  [] Yes  [x]No   Inability to Care for Self/Will Require 24 hour care  [] Yes  [x]No   Pain Management  [] Yes  [x]No   Home Infusion Therapy  [] Yes  [x]No   Oxygen Therapy  [] Yes  [x]No   DME  [] Yes  [x]No   Long Term Care Placement  [] Yes  [x]No   Rehab  [] Yes  [x]No   Physical Therapy  [] Yes  [x]No   Needs Anticipated At This Time  [] Yes  [x]No     Intra-Hospital Referral:    [facility]  [] Yes  [x]No     [] Yes  [x]No   Patient Representative  [] Yes  [x]No   Staff for Teaching Needs  [] Yes  [x]No   Specialty Teaching Needs     Diabetic Educator  [] Yes  [x]No   Referral for Diabetic Educator Needed  [] Yes  [x]No  If Yes, place order for Nutritionist or Diabetic Consult     Tentative Discharge Plan:    Home with No Services  [x] Yes  []No   Home with [address]  [] Yes  []No        If Yes, specify type    [address]  [] Yes  []No        If Yes, specify type    Meals on Wheels  [] Yes  []No   Office of Aging  [] Yes  []No   NHP  [] Yes  []No   Return to the [address]  [] Yes  []No   Rehab Therapy  [] Yes  []No   Acute Rehab  [] Yes  []No   Subacute Rehab  [] Yes  []No   Private Care  [] Yes  []No   Substance Abuse Referral  [] Yes  []No   Transportation  [] Yes  []No   Chore Service  [] Yes  []No   Inpatient Hospice  [] Yes  []No   OP RT  [] Yes  [] No   OP Hemo  [] Yes  [] No   OP PT  [] Yes  []No   Support Group  [] Yes  []No   Reach to Recovery  [] Yes  []No   OP Oncology Clinic  [] Yes  []No   Clinic Appointment  [] Yes  []No   DME  [] Yes  []No   Comments    Name of D/C Planner or  Given to Patient or Family Jess Georges   Phone Number [phone]        Extension    Date 6/16/17   Time    If you are discharged home, whom do you designate to participate in your discharge plan and receive any information needed?      Enter name of designee         Phone # of designee         Address of designee         Updated         Patient refused to designate any           individual

## 2017-06-16 NOTE — PROGRESS NOTES
Received awake,alert,in no acute distress. Call light,phone,urinal with in reach. Splint to left hand in tact. 6/17/17 0200 Pt incontinent of urine,also voided in urinal.Ilsa care done.

## 2017-06-16 NOTE — PROGRESS NOTES
Assume care resting in bed with neurologist and family in the room. A/O x4. No s/s of distress. Denies pain and discomfort at the moment. Bed on lowest position and wheels lock. Call bell within reach. 6/16/2017  1895 Bedside and Verbal shift change report given to [facility] (oncoming nurse) by Caterina Knott RN   and Hortencia Philip (preceptor) (offgoing nurse). Report included the following information SBAR, Kardex, Intake/Output and MAR.

## 2017-06-16 NOTE — ROUTINE PROCESS
Bedside and Verbal shift change report given to Michelle RN and Ugo RN (oncoming nurse) by Hiral Randall RN, BSN (offgoing nurse). Report given with SBAR, Kardex, Intake/Output, and MAR.

## 2017-06-16 NOTE — CONSULTS
Consult    Patient: Ivania Liang MRN: [id number]  SSN: [id number]    YOB: 1980  Age: 39 y.o. Sex: male      Subjective:      Ivania Liang is a 39 y.o.  male evaluated for abnormal MRI. He presented to the hospital specifically because of left hand pain x 2 weeks and right knee pain x 4 weeks. There is also a history of diplopia since March 2017 which has not changed significantly. Story goes back to 2011 when he developed headaches and had MRI described as abnormal \"with lesions\". LP done and told he had meningitis and was put on steroids. 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 While recovering he developed left hemiparesis. Mother says neurologist said it was not a stroke but did not provide diagnosis. He was put on Cellcept but insurance stopped paying for it and he was switched to Imuran. Later his PCP reviewed records and told him he had neurosarcoid. Patient took himself off Imuran about 1-1/2 years ago. In March 2017 he developed horizontal diplopia and presented to [facility] and MRI showed mildly enhancing lesion left thalamus and midbrain. LP showed 8 RBC, 678 WBC, 83% neutrophil, 5% lymph, with CSF glucose 46 and protein 103. CSF with elevated myelin basic protein 6.4 and 0 oligoclonal bands. HIV 1/2 negative. He was put back on Imuran in March with no steroids. He then had follow up as outpatient with Dr. Alexander Lea who in turn has referred him to Dr. Wilfredo Romero to manage the Imuran. He had Botox x 2 for contractures which developed in the left fingers but it did not help and now tendon release is being considered. MRI today reviewed and shows increased size of previously described lesion. No leptomeningeal enhancement. Ddx includes low grade astrocytoma, lymphoma or tumefactive demyelination. Sarcoid was felt less likely given absence of leptomeningeal enhancement.     Past Medical History:   Diagnosis Date    Arm pain     [facility]) 2012    Meningitis     Meningitis     Neurosarcoidosis ([facility] [address] 2012     Past Surgical History:   Procedure Laterality Date    HX HIP REPLACEMENT  2010    HX WISDOM TEETH EXTRACTION        History reviewed. No pertinent family history.   Social History   Substance Use Topics    Smoking status: Former Smoker     Packs/day: 0.25     Quit date: 4/26/2017    Smokeless tobacco: Never Used    Alcohol use No      Current Facility-Administered Medications   Medication Dose Route Frequency Provider Last Rate Last Dose    acetaminophen-codeine (TYLENOL #3) per tablet 1 Tab  1 Tab Oral Q6H PRN Esmer Cifuentes MD   1 Tab at 06/15/17 1835    [START ON 6/16/2017] azaTHIOprine (IMURAN) tablet 50 mg  50 mg Oral DAILY MD Ashlie Hamm ON 6/16/2017] buPROPion XL (WELLBUTRIN XL) tablet 300 mg  300 mg Oral 7am Esmer Cifuentes MD        nystatin (MYCOSTATIN) 100,000 unit/mL oral suspension 500,000 Units  500,000 Units Oral QID Esmer Cifuentes MD   500,000 Units at 06/15/17 1835    [START ON 6/16/2017] polyethylene glycol (MIRALAX) packet 17 g  17 g Oral DAILY Esmer Cifuentes MD        0.9% sodium chloride infusion  50 mL/hr IntraVENous CONTINUOUS Esmer Cifuentes MD 50 mL/hr at 06/15/17 1840 50 mL/hr at 06/15/17 1840    ondansetron (ZOFRAN) injection 4 mg  4 mg IntraVENous Q4H PRN Esmer Cifuentes MD        dexamethasone (DECADRON) 4 mg/mL injection 4 mg  4 mg IntraVENous Q6H Reyes Stearns MD            No Known Allergies    Labs:   Recent Results (from the past 24 hour(s))   CBC WITH AUTOMATED DIFF    Collection Time: 06/15/17  2:05 PM   Result Value Ref Range    WBC 12.6 4.6 - 13.2 K/uL    RBC 3.81 (L) 4.70 - 5.50 M/uL    HGB 10.5 (L) 13.0 - 16.0 g/dL    HCT 32.3 (L) 36.0 - 48.0 %    MCV 84.8 74.0 - 97.0 FL    MCH 27.6 24.0 - 34.0 PG    MCHC 32.5 31.0 - 37.0 g/dL    RDW 15.6 (H) 11.6 - 14.5 %    PLATELET 629 377 - 954 K/uL    MPV 8.9 (L) 9.2 - 11.8 FL    NEUTROPHILS 82 (H) 40 - 73 %    LYMPHOCYTES 10 (L) 21 - 52 %    MONOCYTES 8 3 - 10 %    EOSINOPHILS 0 0 - 5 %    BASOPHILS 0 0 - 2 %    ABS. NEUTROPHILS 10.3 (H) 1.8 - 8.0 K/UL    ABS. LYMPHOCYTES 1.2 0.9 - 3.6 K/UL    ABS. MONOCYTES 1.0 0.05 - 1.2 K/UL    ABS. EOSINOPHILS 0.0 0.0 - 0.4 K/UL    ABS. BASOPHILS 0.0 0.0 - 0.06 K/UL    DF AUTOMATED     METABOLIC PANEL, COMPREHENSIVE    Collection Time: 06/15/17  2:05 PM   Result Value Ref Range    Sodium 137 136 - 145 mmol/L    Potassium 3.7 3.5 - 5.5 mmol/L    Chloride 103 100 - 108 mmol/L    CO2 27 21 - 32 mmol/L    Anion gap 7 3.0 - 18 mmol/L    Glucose 78 74 - 99 mg/dL    BUN 10 7.0 - 18 MG/DL    Creatinine 1.00 0.6 - 1.3 MG/DL    BUN/Creatinine ratio 10 (L) 12 - 20      GFR est AA >60 >60 ml/min/1.73m2    GFR est non-AA >60 >60 ml/min/1.73m2    Calcium 8.5 8.5 - 10.1 MG/DL    Bilirubin, total 0.9 0.2 - 1.0 MG/DL    ALT (SGPT) 22 16 - 61 U/L    AST (SGOT) 20 15 - 37 U/L    Alk.  phosphatase 88 45 - 117 U/L    Protein, total 7.8 6.4 - 8.2 g/dL    Albumin 3.0 (L) 3.4 - 5.0 g/dL    Globulin 4.8 (H) 2.0 - 4.0 g/dL    A-G Ratio 0.6 (L) 0.8 - 1.7     MAGNESIUM    Collection Time: 06/15/17  2:05 PM   Result Value Ref Range    Magnesium 2.0 1.6 - 2.6 mg/dL   SED RATE (ESR)    Collection Time: 06/15/17  2:05 PM   Result Value Ref Range    Sed rate, automated 80 (H) 0 - 15 mm/hr       Testing:     Review of Systems:History obtained from chart review, the patient and mother  General ROS: negative for - fever  Psychological ROS: negative  Ophthalmic ROS: positive for - double vision  ENT ROS: negative  Allergy and Immunology ROS: negative  Hematological and Lymphatic ROS: negative  Endocrine ROS: negative  Respiratory ROS: negative for - cough or shortness of breath  Cardiovascular ROS: negative for - chest pain  Gastrointestinal ROS: negative for - nausea/vomiting or swallowing difficulty/pain  Genito-Urinary ROS: negative for - incontinence  Musculoskeletal ROS: positive for - pain in hand - left and knee - right and swelling in knee - right  Neurological ROS: positive for - gait disturbance, headaches, impaired coordination/balance, speech problems and weakness  negative for - bowel and bladder control changes  Dermatological ROS: negative      Objective:     Vitals:    06/15/17 1730 06/15/17 1745 06/15/17 1800 06/15/17 1826   BP: 126/84 114/50 108/51 129/85   Pulse: (!) 112   (!) 113   Resp: 21 17   Temp:    99.2 °F (37.3 °C)   SpO2: 95% 99% 100% 98%   Weight:       Height:            Physical Exam:      Young AAM in NAD  NC/AT  Neck without bruit  RRR with murmur  Extremities with mild swelling right knee, painful on moving. Contractures of fingers left hand  Awake, alert. Dysarthric. Attends both visual fields. Question slight decreased adduction right eye. No ptosis. Pupils 2 mm reactive. Left facial weakness. Decreased right facial sensation. Palate elevation normal and symmetric. Tongue midline  Normal tone, bulk and strength right. Spastic left arm and leg. Strength left arm 4/5 except unable to extend fingers. Left leg good strength. Reflexes 2+ right arm and 1+ left arm. Left knee reflex 2+. Ankle reflexes 1+ symmetric. Left toe up going. Decreased pinprick right arm and leg. FNF right normal.    Assessment/Plan: 1. Abnormal MRI. Diagnosis of sarcoid assigned in past but MRI not entirely convincing. Mass is enlarging which could be expanding sarcoid lesion. He has only been back on Imuran 3 months and it may take up to 6 months for full effect. Worrisome for possible tumor. 2. Diplopia, secondary to #1.  3. Left spastic hemiparesis. Plan: Will initiate Decadron. Will ask neurosurgery to see for possible biopsy.      Hospital Problems  Date Reviewed: 3/27/2017          Codes Class Noted POA    Neurosarcoidosis [facility]) ICD-10-CM: G39.53  ICD-9-CM: 135  10/17/2016 Unknown                Signed By: Heath Keene MD     Nancy 15, 2017

## 2017-06-16 NOTE — PROGRESS NOTES
Nutrition initial assessment/Plan of care      RECOMMENDATIONS:   1. Regular  2. Monitor weight and PO intake  3. RD to follow     GOALS:   1. PO intake meets >75% of protein/calorie needs by 6/23  2. Weight Maintenance(+/- 1-2#) by  6/23          ASSESSMENT:   Per BMI of 21.2  wt is classified as normal.  Visited pt per MST. Nutrition recommendations listed. RD to follow. Nutrition Diagnoses:   No nutrition Diagnosis    Nutrition Risk:  [] High  [] Moderate [x]  Low    SUBJECTIVE/OBJECTIVE:     Visited pt, he states UBW is 165#, has been thin his entire adult life, may have lost 1-2#. Teeth in good condition, appetite good. Eats 2 meals per day at home. Alb low, however expect this is D/T inflammatory process. Information Obtained from:    [x] Chart Review   [x] Patient   [] Family/Caregiver   [] Nurse/Physician   [] Interdisciplinary Meeting/Rounds    Dx: Neurosarcoidosis  Diet: REgular  Medications: [x] Reviewed    Allergies: [x] Reviewed   Encounter Diagnoses     ICD-10-CM ICD-9-CM   1. Neurosarcoidosis ([facility] [address] D86.89 135   2. Double vision H53.2 368.2     Past Medical History:   Diagnosis Date    Arm pain     CVA (cerebrovascular accident) ([facility] [address] 2012    Meningitis     Meningitis     Neurosarcoidosis ([facility] [address] 2012      Labs:    Lab Results   Component Value Date/Time    Sodium 141 06/16/2017 04:35 AM    Potassium 4.5 06/16/2017 04:35 AM    Chloride 107 06/16/2017 04:35 AM    CO2 24 06/16/2017 04:35 AM    Anion gap 10 06/16/2017 04:35 AM    Glucose 109 06/16/2017 04:35 AM    BUN 8 06/16/2017 04:35 AM    Creatinine 0.70 06/16/2017 04:35 AM    Calcium 8.5 06/16/2017 04:35 AM    Magnesium 2.0 06/15/2017 02:05 PM    Albumin 2.7 06/16/2017 04:35 AM     Anthropometrics:    Last 3 Recorded Weights in this Encounter    06/15/17 1258 06/15/17 1302   Weight: 86.2 kg (190 lb) 74.8 kg (165 lb)      Ht Readings from Last 1 Encounters:   06/15/17 6' 2\" (1.88 m)     No data found.       IBW:190 lb %IBW: 87% UBW: 165 lb   [] Weight Loss [] Weight Gain [x] Weight Stable    Estimated Nutrition Needs: [x] MSJ  [] Other:  Calories: 2100 kcal Based on:   [x] Actual BW    Protein:   65 g Based on:   [x] Actual BW    Fluid:       2100  ml Based on:   [x] Actual BW      [x] No Cultural, Sikh or ethnic dietary need identified.     [] Cultural, Sikh and ethnic food preferences identified and addressed     Wt Status:  [x] Normal (18.6 - 24.9) [] Underweight (<18.5) [] Overweight (25 - 29.9) [] Mild Obesity (30 - 34.9)  [] Moderate Obesity (35 - 39.9) [] Morbid Obesity (40+)   [] Moderate Malnutrition [] Severe Malnutrition in the context of :     Nutrition Problems Identified:   [] Suboptimal PO intake   [] Food Allergies  [] Difficulty chewing/swallowing/poor dentition  [] Constipation/Diarrhea   [] Nausea/Vomiting   [x] None  [] Other:     Plan:   [] Therapeutic Diet  []  Obtained/adjusted food preferences/tolerances and/or snacks options   []  Supplements added   [] Occupational therapy following for feeding techniques  []  HS snack added   []  Modify diet texture   []  Modify diet for food allergies   []  Educate patient   []  Assist with menu selection   [x]  Monitor PO intake on meal rounds   [x]  Continue inpatient monitoring and intervention   []  Participated in discharge planning/Interdisciplinary rounds/Team meetings   []  Other:     Education Needs:   [] Not appropriate for teaching at this time due to:   [x] Identified and addressed    Nutrition Monitoring and Evaluation:  [x] Continue ongoing monitoring and intervention  [] Other    Jarvis Kennedy  Pager: [phone]

## 2017-06-16 NOTE — PROGRESS NOTES
Progress Note    Patient: Ning Robb. MRN: [id number]  SSN: [id number]    YOB: 1980  Age: 39 y.o. Sex: male      Admit Date: 6/15/2017    LOS: 1 day     Subjective:     No complaints. Seen by Dr. Qamar Devi and biopsy anticipated. I reviewed records from [facility] and in 2014 MRI showed multiple bilateral lesions thalami and subcortical area and apparently there was clinical response to steroids which argues for sarcoid. CSF ACE was negative but insensitive test. Nonetheless no tissue diagnosis and unusual appearance with increasing size worrisome. Decadron started last night. If biopsy shows sarcoid then will plan on increasing Imuran and change to prednisone. Objective:     Vitals:    06/15/17 1745 06/15/17 1800 06/15/17 1826 06/16/17 0610   BP: 114/50 108/51 129/85 108/69   Pulse:   (!) 113 68   Resp:   17 18   Temp:   99.2 °F (37.3 °C) 97.4 °F (36.3 °C)   SpO2: 99% 100% 98% 99%   Weight:       Height:            Intake and Output:  Current Shift: 06/16 0701 - 06/16 1900  In: -   Out: 675 [Urine:675]  Last three shifts: 06/14 1901 - 06/16 0700  In: -   Out: 300 [Urine:300]    Physical Exam:   GENERAL: alert, cooperative, no distress, appears stated age  NEUROLOGIC: awake, alert. Speech slightly dysarthric. Lab/Data Review: All lab results for the last 24 hours reviewed. WBC 9600, hgb 10.2, Na 141 and glucose 109. Echo normal    Assessment/Plan: 1. Abnormal MRI brain with mass thalamus and midbrain, increasing. Question unusual appearance for sarcoid. History suggests had more lesions 2014 which did improve on steroids/Cellcept. Consider low grade tumor. 2. Diplopia secondary #1  3.  Hemiparesis secondary #1    Plan: Continue steroids and await biopsy     Active Problems:    Neurosarcoidosis ([facility] [address] (10/17/2016)      Abnormal MRI scan, head (6/15/2017)      Diplopia (6/15/2017)            Signed By: Elissa Leyva MD     June 16, 2017

## 2017-06-16 NOTE — PROGRESS NOTES
Progress Note      Patient: Jenny Castellanos. Sex: male          DOA: 6/15/2017       YOB: 1980      Age:  39 y.o.        LOS:  LOS: 1 day               Subjective:   Help of neurology and neurosurgery greatly appreciated . unfortunate opatient . He will require a biopsy for a diagnosis . discussed with dr ashly mosher and nancy . The inr is 1.4 will give the pt some vitamin k       Objective:      Visit Vitals    /71 (BP 1 Location: Left arm, BP Patient Position: At rest)    Pulse 100    Temp 99.9 °F (37.7 °C)    Resp 16    Ht 6' 2\" (1.88 m)    Wt 74.8 kg (165 lb)    SpO2 97%    BMI 21.18 kg/m2       Physical Exam:  Pt is awake and has ongoing diplopia   Heart reg rate and rhythm   Lungs good breth sounds heard   Abdomen soft and no masses felt . Neuro ataxic . diplopia . Disconjugate gaze      Lab/Data Reviewed:  CMP:   Lab Results   Component Value Date/Time     06/16/2017 04:35 AM    K 4.5 06/16/2017 04:35 AM     06/16/2017 04:35 AM    CO2 24 06/16/2017 04:35 AM    AGAP 10 06/16/2017 04:35 AM     (H) 06/16/2017 04:35 AM    BUN 8 06/16/2017 04:35 AM    CREA 0.70 06/16/2017 04:35 AM    GFRAA >60 06/16/2017 04:35 AM    GFRNA >60 06/16/2017 04:35 AM    CA 8.5 06/16/2017 04:35 AM    ALB 2.7 (L) 06/16/2017 04:35 AM    TP 6.9 06/16/2017 04:35 AM    GLOB 4.2 (H) 06/16/2017 04:35 AM    AGRAT 0.6 (L) 06/16/2017 04:35 AM    SGOT 21 06/16/2017 04:35 AM    ALT 23 06/16/2017 04:35 AM     CBC:   Lab Results   Component Value Date/Time    WBC 9.6 06/16/2017 04:35 AM    HGB 10.2 (L) 06/16/2017 04:35 AM    HCT 31.3 (L) 06/16/2017 04:35 AM     06/16/2017 04:35 AM           Assessment/Plan     Active Problems:    Neurosarcoidosis ([facility] [address] (10/17/2016)      Abnormal MRI scan, head (6/15/2017)      Diplopia (6/15/2017)  Mass in the thalamus  And the peduncle with diplopia .       Plan:for a brain biopsy  Next week

## 2017-06-16 NOTE — CONSULTS
[facility]    Name:  Thad Leyden  MR#:  [id number]  :  1980  Account #:  [de-identified]  Date of Adm:  06/15/2017  Date of Consultation:  2017      CHIEF COMPLAINT: Enlarging left thalamic/peduncle mass. HISTORY OF PRESENT ILLNESS: The patient is an unfortunate 39  year-old male presenting with a known history of CNS sarcoidosis,  previous history of stroke and a known lesion in the peduncle on the  left side. The patient was most recently hospitalized in 2017 in the  [facility] where an MRI scan revealed this lesion. He had  been off therapy at that time. Therapy was reinstituted and the patient  again was admitted last night through the emergency room with a  history of falling and new onset double vision. The MRI scan done  today and actually last night shows enlargement of this mass. Neurosurgery was requested to arrange a brain biopsy. His laboratories do show that his INR is elevated a bit. The patient lives with his mom who is not currently here. PHYSICAL EXAMINATION  NEUROLOGIC: He is awake, he is alert, he able to stand, but he is  very unsteady on his feet. He has double vision, and the right eye is  patched. External ocular motions basically disconjugate. He can follow  simple commands in all 4 extremities, but he is quite ataxic. ASSESSMENT: My assessment would be that he has an enlarging left  thalamic/peduncular mass. I would agree with Dr. Seda Nelson that probably a  biopsy is the next step. I will plan to discuss this with the mother when  she is available. We will arrange this under general anesthesia for next  week.         MD Quinn Woods / Simeon Hernández  D:  2017   11:16  T:  2017   11:35  Job #:  [id number]

## 2017-06-16 NOTE — CONSULTS
Neurosurgery Consult:   Cc: enlarging lesion in left thalmus and peduncle  Hx:  Mr. Jonelle Hernandez is an unfortunate  39year old male presenting with a known history of cns sarcoidosis. Diagnosis apparently made in [facility] several years ago via lumbar puncture. Has had a stoke as well. In the hospital in March, mri revealed lesion in the left peducle and posterior thalamus, mass has increased in size in spite of therapy. Presenting now with increased falling, and diplopia inr 1.4  Mother is not present  Exam:  Chronically ill appearing male alert. Diplopia noted with right eye patched.  Unsteady on in gait and balenced follows some simple commands  A; enlarging left thalamic mass  P;  Will review with partners, will try to schedule biopsy next week

## 2017-06-16 NOTE — PROGRESS NOTES
Patient has designated ________________mother________ to participate in his/her discharge plan and to receive any needed information.      Name:rebecca santos  Address:  Phone number:[phone]

## 2017-06-17 NOTE — PROGRESS NOTES
Assumed patient care. Received patient awake, alert,oriented X4. Respiration is even, unlabored. Patient denies any pain/ discomfort at this time. Bed is locked and in lowest position and call bell is within reach. Not in acute distress.

## 2017-06-17 NOTE — ROUTINE PROCESS
Bedside and Verbal shift change report given to Debbie Erazo RN (oshncoming nurse) by Tianna Galeana RN (offgoing nurse). Report included the folowing information SBAR, Kardex, Intake/Output, MAR and Recent Results.

## 2017-06-17 NOTE — ROUTINE PROCESS
Bedside and Verbal shift change report given to [employer]  (oncoming nurse) by salvatore garcía rn (offgoing nurse). Report given with SBAR, Kardex, Intake/Output and Recent Results.

## 2017-06-17 NOTE — PROGRESS NOTES
Progress Note    Patient: Nacho Powers. MRN: [id number]  SSN: [id number]    YOB: 1980  Age: 39 y.o. Sex: male      Admit Date: 6/15/2017    LOS: 2 days     Subjective:     Comfortable, no pain. He is getting out of bed. Remains on Decadron pending biopsy of brain mass. Objective:     Vitals:    06/16/17 0610 06/16/17 1800 06/16/17 2115 06/17/17 0620   BP: 108/69 106/71 114/68 104/65   Pulse: 68 100 91 60   Resp: 18 16 18 16   Temp: 97.4 °F (36.3 °C) 99.9 °F (37.7 °C) 98.6 °F (37 °C) 97.5 °F (36.4 °C)   SpO2: 99% 97% 96% 99%   Weight:       Height:            Intake and Output:  Current Shift: 06/17 0701 - 06/17 1900  In: -   Out: 240 [Urine:240]  Last three shifts: 06/15 1901 - 06/17 0700  In: 840 [P.O.:240; I.V.:600]  Out: 1750 [Urine:1750]    Physical Exam:   GENERAL: alert, cooperative, no distress, appears stated age  EXTREMITIES:  extremities normal, atraumatic, no cyanosis or edema  NEUROLOGIC: awake, alert. Mild dysarthria. Normal strength right. Left arm strength good except for hand where he has contractures of fingers and no significant movement. Left leg strength good    Lab/Data Review: All lab results for the last 24 hours reviewed. Assessment/Plan: 1. Abnormal MRI with mass thalamus and midbrain, question sarcoid verus low grade tumor. 2. Left hand weakness and contractures  3. Diplopia secondary #1    Plan: Continue steroids pending biopsy.      Active Problems:    Neurosarcoidosis ([facility] [address] (10/17/2016)      Abnormal MRI scan, head (6/15/2017)      Diplopia (6/15/2017)            Signed By: Madhu Andersen MD     June 17, 2017

## 2017-06-17 NOTE — PROGRESS NOTES
Neurosurgery Progress NOte;   Left word on mother's cell phone with my phone number.    Plan mri stealth assisted biopsy under general anesthesia Monday after noon  Will repeat coags probably will need plasma preop \  Discussed with patient

## 2017-06-17 NOTE — PROGRESS NOTES
Progress Note      Patient: Ning Robb. Sex: male          DOA: 6/15/2017       YOB: 1980      Age:  39 y.o.        LOS:  LOS: 2 days               Subjective:   Discussed with dr Qamar Devi . The pt will go for surgery on monday . His inr is now 1.2 . He will need a unit of ffp before he goes to surgery on Monday . pt is on decadron as per neurology      Objective:      Visit Vitals    /65 (BP 1 Location: Left arm, BP Patient Position: At rest)    Pulse 60    Temp 97.5 °F (36.4 °C)    Resp 16    Ht 6' 2\" (1.88 m)    Wt 74.8 kg (165 lb)    SpO2 99%    BMI 21.18 kg/m2       Physical Exam:  Pt has his right eye covered . Heart reg rate ad rhythm   Lungs good breath sounds heard   Abdomen soft and nontender   Neuro dysarthria present left hand flexure contracture from his previous cva         Lab/Data Reviewed:  CMP:   Lab Results   Component Value Date/Time     06/17/2017 07:55 AM    K 4.5 06/17/2017 07:55 AM     06/17/2017 07:55 AM    CO2 23 06/17/2017 07:55 AM    AGAP 10 06/17/2017 07:55 AM     (H) 06/17/2017 07:55 AM    BUN 11 06/17/2017 07:55 AM    CREA 0.59 (L) 06/17/2017 07:55 AM    GFRAA >60 06/17/2017 07:55 AM    GFRNA >60 06/17/2017 07:55 AM    CA 8.6 06/17/2017 07:55 AM    ALB 2.7 (L) 06/17/2017 07:55 AM    TP 6.3 (L) 06/17/2017 07:55 AM    GLOB 3.6 06/17/2017 07:55 AM    AGRAT 0.8 06/17/2017 07:55 AM    SGOT 25 06/17/2017 07:55 AM    ALT 28 06/17/2017 07:55 AM     CBC:   Lab Results   Component Value Date/Time    WBC 12.5 06/17/2017 07:55 AM    HGB 10.2 (L) 06/17/2017 07:55 AM    HCT 31.9 (L) 06/17/2017 07:55 AM     06/17/2017 07:55 AM           Assessment/Plan     Active Problems:    Neurosarcoidosis ([facility] [address] (10/17/2016) vs glioma or other low grade tumor       Abnormal MRI scan, head (6/15/2017)      Diplopia (6/15/2017)        Plan: inr daily . will need one unit of fresh frozen plasma just before surgery on monday

## 2017-06-17 NOTE — PROGRESS NOTES
Assumed pt care. Received pt resting in bed, alert and oriented. No s/s of distress. Bed on lowest position, wheels locked, call bell within reach. [phone]: Bedside and Verbal shift change report given to Cari Lux (oncoming nurse) by Tonio Courtney   (offgoing nurse). Report included the following information SBAR, Kardex, Intake/Output and MAR.

## 2017-06-18 PROBLEM — G93.89 BRAIN MASS: Status: ACTIVE | Noted: 2017-01-01

## 2017-06-18 NOTE — PROGRESS NOTES
Witness consent for surgery, scheduled for 6/19/17 at about 12:30. Patient is to be NPO after midnight. He may have IV medications, and Ancef per Dr Barrera [facility].

## 2017-06-18 NOTE — PROGRESS NOTES
Progress Note      Patient: Jonelle Cobb. Sex: male          DOA: 6/15/2017       YOB: 1980      Age:  39 y.o.        LOS:  LOS: 3 days               Subjective:   No complaints feeling well, brain biopsy tomorrow    Objective:      Visit Vitals    /67 (BP 1 Location: Right arm, BP Patient Position: At rest)    Pulse (!) 51    Temp 97.4 °F (36.3 °C)    Resp 16    Ht 6' 2\" (1.88 m)    Wt 74.8 kg (165 lb)    SpO2 100%    BMI 21.18 kg/m2       Physical Exam:  Pt has his right eye covered .    Heart reg rate ad rhythm   Lungs good breath sounds heard   Abdomen soft and nontender   Neuro dysarthria present left hand flexure contracture from his previous cva         Lab/Data Reviewed:  CMP:   Lab Results   Component Value Date/Time     06/18/2017 04:50 AM    K 4.3 06/18/2017 04:50 AM     (H) 06/18/2017 04:50 AM    CO2 26 06/18/2017 04:50 AM    AGAP 7 06/18/2017 04:50 AM     (H) 06/18/2017 04:50 AM    BUN 12 06/18/2017 04:50 AM    CREA 0.65 06/18/2017 04:50 AM    GFRAA >60 06/18/2017 04:50 AM    GFRNA >60 06/18/2017 04:50 AM    CA 8.5 06/18/2017 04:50 AM    ALB 2.6 (L) 06/18/2017 04:50 AM    TP 6.3 (L) 06/18/2017 04:50 AM    GLOB 3.7 06/18/2017 04:50 AM    AGRAT 0.7 (L) 06/18/2017 04:50 AM    SGOT 16 06/18/2017 04:50 AM    ALT 31 06/18/2017 04:50 AM     CBC:   Lab Results   Component Value Date/Time    WBC 10.8 06/18/2017 04:50 AM    HGB 10.5 (L) 06/18/2017 04:50 AM    HCT 33.0 (L) 06/18/2017 04:50 AM     06/18/2017 04:50 AM           Assessment/Plan     Active Problems:    Neurosarcoidosis ([address] (10/17/2016) vs glioma or other low grade tumor       Abnormal MRI scan, head (6/15/2017)      Diplopia (6/15/2017)        Plan:     Brain mass vs neurosarcoid-  Brain biopsy tomorrow on steriods  Hx CVA with left hemiparesis  Hx of aseptic necrosis R hip

## 2017-06-18 NOTE — PROGRESS NOTES
Neurosurgery :  Image guided brain biopsy scheduled for tomorrow spoke with mother yesterday and will meet later today

## 2017-06-18 NOTE — PROGRESS NOTES
[facility]: This patient meets P & T approved criteria for conversion from IV to oral therapy for the following medication:     Dexamethasone 4 mg IV q6h was discontinued and Dexamethasone 4 mg PO QID with meals was started. If the patient no longer meets all criteria for oral therapy, the pharmacist will switch back to IV therapy. Thanks.

## 2017-06-18 NOTE — PROGRESS NOTES
Assumed patient care. Received patient asleep at this time. No s/s of pain/ discomfort noted. Respiration is even, unlabored. Bed is low and in lowest position and call bell is within reach. Not in acute distress.

## 2017-06-19 NOTE — PROGRESS NOTES
completed follow up visit with patient and a Spiritual assessment of patient was done. Patient says he is doing ok at present, no worries or complaints.   Chaplains will continue to follow and will provide pastoral care on an as needed/requested basis    bin Fuller   Board Certified [address] [place] [address]   [phone]

## 2017-06-19 NOTE — PROGRESS NOTES
RRT called after nurse heard very loud noise in patients room. Patient was found down on the floor. He states that he fell on his back but has no injury. He denies hitting his head. He denies any pain. Patient was examined. No obvious trauma noticed, no tenderness. Visit Vitals    /66    Pulse 72    Temp 97.5 °F (36.4 °C)    Resp 16    Ht 6' 2\" (1.88 m)    Wt 74.8 kg (165 lb)    SpO2 100%    BMI 21.18 kg/m2     Plan:  Xray of thoracolumbar spine   Am labs.       Ranjit Cedeno MD

## 2017-06-19 NOTE — PROGRESS NOTES

## 2017-06-19 NOTE — PERIOP NOTES
TRANSFER - OUT REPORT:    Verbal report given to [address] [place] [address] on [employer].  being transferred to OR  for ordered procedure       Report consisted of patients Situation, Background, Assessment and   Recommendations(SBAR). Information from the following report(s) SBAR was reviewed with the receiving nurse. Lines:   Peripheral IV 06/18/17 Right Wrist (Active)   Site Assessment Clean, dry, & intact 6/19/2017 11:36 AM   Phlebitis Assessment 0 6/19/2017 11:36 AM   Infiltration Assessment 0 6/19/2017 11:36 AM   Dressing Status Clean, dry, & intact 6/19/2017 11:36 AM   Dressing Type Tape;Transparent 6/19/2017 11:36 AM   Hub Color/Line Status Pink; Infusing 6/19/2017 11:36 AM   Action Taken Open ports on tubing capped 6/19/2017 10:53 AM   Alcohol Cap Used Yes 6/19/2017 10:53 AM        Pt oriented x 4, but drowsy. Awakens to voice. Dressing right eye intact. PIV #20 right wrist with NS running without problems. No c/o pain. Per notes, pt fell yesterday. No injuries. Pt's father at bedside. Opportunity for questions and clarification was provided. Patient transported with transport .

## 2017-06-19 NOTE — PERIOP NOTES
1506  Received pt. Connected pt to monitor. VSS. Assessment preformed. RN at bedside. Will continue to monitor. CRNA stated pt SBP to remain <150.     1536  Md at bedside assessing pt. Informed transfer order not written, new orders received. 0  calld to waiting area, no family present for update. 1555  Pt unable to verbalize for orientation questions, will respond with shaking head. 1700  TRANSFER - OUT REPORT:    Verbal report given to Lita RN(name) on [employer].  being transferred to 2600(unit) for routine progression of care       Report consisted of patients Situation, Background, Assessment and   Recommendations(SBAR). Information from the following report(s) SBAR, OR Summary, Intake/Output and MAR was reviewed with the receiving nurse. Lines:   Peripheral IV 06/19/17 Right External jugular (Active)   Site Assessment Clean, dry, & intact 6/19/2017  4:01 PM   Phlebitis Assessment 0 6/19/2017  4:01 PM   Infiltration Assessment 0 6/19/2017  4:01 PM   Dressing Status Clean, dry, & intact 6/19/2017  4:01 PM   Dressing Type Tape;Transparent 6/19/2017  4:01 PM   Hub Color/Line Status Pink; Infusing 6/19/2017  4:01 PM        Opportunity for questions and clarification was provided.       Patient transported with:   Monitor  Registered Nurse  Tech

## 2017-06-19 NOTE — ANESTHESIA PREPROCEDURE EVALUATION
Anesthetic History               Review of Systems / Medical History  Patient summary reviewed, nursing notes reviewed and pertinent labs reviewed    Pulmonary                   Neuro/Psych       CVA  TIA    Comments: neurosarcoidosis Cardiovascular                       GI/Hepatic/Renal                Endo/Other             Other Findings   Comments:   Risk Factors for Postoperative nausea/vomiting:       History of postoperative nausea/vomiting? NO       Female? NO       Motion sickness? NO       Intended opioid administration for postoperative analgesia? YES      Smoking Abstinence  Current Smoker? YES  Elective Surgery? YES  Seen preoperatively by anesthesiologist or proxy prior to day of surgery? YES  Pt abstained from smoking 24 hours prior to anesthesia?  NO         Physical Exam    Airway  Mallampati: II  TM Distance: > 6 cm  Neck ROM: normal range of motion   Mouth opening: Normal     Cardiovascular    Rhythm: regular  Rate: normal         Dental  No notable dental hx       Pulmonary  Breath sounds clear to auscultation               Abdominal  GI exam deferred       Other Findings            Anesthetic Plan    ASA: 4            Induction: Intravenous  Anesthetic plan and risks discussed with: Patient and Father

## 2017-06-19 NOTE — PROGRESS NOTES
Was in room next door and heard a loud bang. Upon arrival to room, pt was found on the floor laying on his back. Patient was alert and oriented, stated he did not hit his head and had no complaints of pain. RRT called for unobserved fall. Patient remained on unit.

## 2017-06-19 NOTE — PROGRESS NOTES
Assumed pt care. Received pt resting in bed watching TV. Pt is alert and oriented x 4. Denies any pain at this time. No s/s of distress. Call bell within reach. 6-    0745: Bedside and Verbal shift change report given to Abhay Wylie (oncoming nurse) by Erickson Nolen   (offgoing nurse). Report included the following information SBAR, Kardex, Intake/Output and MAR.

## 2017-06-19 NOTE — BRIEF OP NOTE
BRIEF OPERATIVE NOTE    Date of Procedure: 6/19/2017   Preoperative Diagnosis: left thalamus and peduncle lesion  i63.8  Postoperative Diagnosis: left th;amus and peduncle lesion  i63.8    Procedure(s):  image guided left thalamus biopsysent for fungal and afb stain and cultures with histological as well  Surgeon(s) and Role:     * Sherron Oscar MD - Primary         Assistant Staff: Gricel Serrano       Surgical Staff:  Circ-1: Kayden Dukes  Scrub Tech-1: Anamaria Hall  Surg Asst-1: José Mendiola  Event Time In   Incision Start 1333   Incision Close 1445     Anesthesia: General   Estimated Blood Loss: minimal  Specimens:   ID Type Source Tests Collected by Time Destination   1 :     Sherron Oscar MD 6/19/2017 1342 Pathology   2 : Claus Payne MD 6/19/2017 1353 Pathology   3 : brain tissue Frozen Section Tissue  Sherron Oscar MD 6/19/2017 1438 Pathology   4 : Κυλλήνfernanda Conde MD 6/19/2017 1442 Pathology   5 : Κυλλήνfernanda Conde MD 6/19/2017 1443 Pathology   6 : Κυλλήνη MD Elton 6/19/2017 1443 Pathology   7 : Κυλλήνη MD Elton 6/19/2017 1444 Pathology   1 : BRAIN Tissue Brain AFB CULTURE, FUNGUS CULTURE, Edna Purcell MD 6/19/2017 1342 Microbiology      Findings: none   Complications: none  Implants: * No implants in log *

## 2017-06-19 NOTE — PROGRESS NOTES
1720 Patient arrived to neuro ICU. Dual neuro check done with PACU nurse. [id number] Neurosurgery paged to report that patient right side weaker than left as per exam and parent's at the bedside. Patient's baseline is left sided weakness. Patient only able to verbalize name since arrival from PACU but he nods appropriately when asked questions and given options. 0 Dr. Manny Gonzáles, on call surgeon, staff called back from the OR. Surgeon to call back when case completed. 1815 Dr. Attila Hutchison called back to order Pavel Heap Dr. Ubaldo Reeves met patient in CT and came upstairs to discuss results with patient's mother. Bedside and Verbal shift change report given to Willis Weiner RN (oncoming nurse) by Keisha Ibanez RN   (offgoing nurse). Report included the following information SBAR, Kardex, OR Summary, Intake/Output, MAR and Cardiac Rhythm NSR.

## 2017-06-19 NOTE — PROGRESS NOTES
Assumed care of patient, patient in bed resting. PAtient will be going down to CT , of back due to the fact that patient had a fall overnight. Sbar report received from [facility]     2035 Per Dr. Rajat Michael hold all of patient medication until after biopsy.

## 2017-06-19 NOTE — PROGRESS NOTES
Progress Note      Patient: Nacho Powers. Sex: male          DOA: 6/15/2017       YOB: 1980      Age:  39 y.o.        LOS:  LOS: 4 days               Subjective:   No complaints feeling well,fell last night, no pain, did  Not hit head or LOC    Objective:      Visit Vitals    /77 (BP 1 Location: Left arm, BP Patient Position: At rest)    Pulse 63    Temp 98.5 °F (36.9 °C)    Resp 14    Ht 6' 2\" (1.88 m)    Wt 74.8 kg (165 lb)    SpO2 99%    BMI 21.18 kg/m2       Physical Exam:  Pt has his right eye covered .    Heart reg rate ad rhythm   Lungs good breath sounds heard   Abdomen soft and nontender   Neuro dysarthria present left hand flexure contracture from his previous cva         Lab/Data Reviewed:  CMP:   No results found for: NA, K, CL, CO2, AGAP, GLU, BUN, CREA, GFRAA, GFRNA, CA, MG, PHOS, ALB, TBIL, TP, ALB, GLOB, AGRAT, SGOT, ALT, GPT  CBC:   No results found for: WBC, HGB, HGBEXT, HCT, HCTEXT, PLT, PLTEXT, HGBEXT, HCTEXT, PLTEXT        Assessment/Plan     Active Problems:    Neurosarcoidosis ([facility] [address] (10/17/2016) vs glioma or other low grade tumor       Abnormal MRI scan, head (6/15/2017)      Diplopia (6/15/2017)        Plan:     Brain mass vs neurosarcoid-  Brain biopsy today, on steriods  Hx CVA with left hemiparesis  Hx of aseptic necrosis R hip

## 2017-06-19 NOTE — OP NOTES
Tony Jones    Name:  Helene Velázquez  MR#:  [id number]  :  1980  Account #:  [de-identified]  Date of Adm:  06/15/2017  Date of Surgery:  2017      PREOPERATIVE DIAGNOSES  1. Progressive abnormality on brain MRI. 2. History of neurosarcoidosis. POSTOPERATIVE DIAGNOSES  1. Progressive abnormality on brain MRI. 2. History of neurosarcoidosis. PROCEDURES PERFORMED: Left image-guided Stealth Medtronic  brain biopsy. SURGEON: Tricia Juan MD    ASSISTANT: Pavel Valentin    ANESTHESIA: General.    ESTIMATED BLOOD LOSS: Minimal.    FLUID REPLACEMENT: 800 of crystalloid. SPECIMENS REMOVED: AFB and fungal cultures as well as  histology. COMPLICATIONS: None. ESTIMATED BLOOD LOSS: Minimal.    DESCRIPTION OF PROCEDURE: The patient is an unfortunate 39  year-old gentleman who presents with a chronic obstructive process  involving his brain. In March, he presented with weakness and visual  problems and was found to have a lesion in his posterior thalamus and  upper brain stem. He had the diagnosis of neurosarcoid and had been  on Imuran therapy. The patient prior to his admission began having  increasing amounts of difficulty and eventually came into the hospital  on the medical service. A followup MRI scan showed an enlargement  and progression in the lesion in the left thalamus when compared to  March. Different treatment options were discussed and it was felt that  tissue was helpful for this process. The patient underwent a volumetric  MRI scan of the brain with contrast prior to this procedure. This was  placed onto a CD ROM disk and brought up to the [employer] station in  the [address] [place] [address]. It was placed on the Stealth machine. Utilizing optic guidance,  the patient was initially intubated and a Gandhi catheter was placed. The  table was turned 180 degrees from anesthesia and his head was  placed in 3-pin fixation device Outing head choudhary.  The optic array  was adjusted such that the camera could see the reference point  without difficulty. We had planned a left-sided biopsy, approximately 11  mm up from the nasion and 11 cm up from the  nasion and 4-5 cm off the  midline, utilizing the Navigus probe prior to prepping this was actually  fairly good location of this bur hole. The patient was given antibiotics. He had been on Decadron. Great care was taken by Anesthesia to  avoid any hypertension during the biopsy procedure. The field was  prepped and draped and infiltrated with Xylocaine with epinephrine. A 2-inch incision was made and a self-retaining retractor was placed. A  power  was utilized to generate a bur hole. The dura was  coagulated and divided. Utilizing a Navigus angled baseplate, this was  attached to the skull. Utilizing a Navigus probe, we were able to  localize the target exactly. We got the length of 130 mm from entry  point to target and made adjustments such that this depth would be in  the middle of our biopsy probe. It was then passed without difficulty to  the original depth. I took a biopsy at 90, 180, 270, and 360 degrees  and in a circumferential fashion. Gentle suction was applied and very  nice core tissues were obtained. I then advanced the needle biopsy 1  cm out and took 2 sets of biopsies at this level. At this juncture, the  patient was tolerating the procedure. We removed the needle. There  was no bleeding. A piece of Gelfoam soaked in thrombin was placed in  the bur hole. The wound was closed utilizing 2-0 Vicryl and the skin  was closed with a 3-0 nylon. Dressing was applied. Subsequently, the  patient was allowed to awake, taken with the Tsai pins and taken  to the recovery room. It should be noted, I did review the initial  pathology with the pathologist after we closed. She felt that on frozen  section, we clearly had abnormal tissue with hypercellularity.  Certainly  this could be gliosis versus a low-grade glioma. There was still some  perivascular cuffing, which supported an inflammatory process. We did  also send cultures, AFB and fungal. The patient tolerated the  procedure quite well.         MD SAUD Celaya / AMANDA  D:  06/19/2017   15:23  T:  06/19/2017   16:06  Job #:  [id number]

## 2017-06-19 NOTE — ANESTHESIA POSTPROCEDURE EVALUATION
Post-Anesthesia Evaluation and Assessment    Patient: Jessica Jain. MRN: [id number]  SSN: [id number]    YOB: 1980  Age: 39 y.o. Sex: male       Cardiovascular Function/Vital Signs  Visit Vitals    /63    Pulse 79    Temp 36.4 °C (97.5 °F)    Resp 10    Ht 6' 2\" (1.88 m)    Wt 74.8 kg (165 lb)    SpO2 97%    BMI 21.18 kg/m2       Patient is status post general anesthesia for Procedure(s):  image guided left thalamus biopsy. Nausea/Vomiting: None    Postoperative hydration reviewed and adequate. Pain:  Pain Scale 1: Numeric (0 - 10) (06/19/17 1601)  Pain Intensity 1: 0 (06/19/17 1601)   Managed    Neurological Status:   Neuro (WDL): Exceptions to WDL (06/19/17 1601)  Neuro  Neurologic State: Drowsy; Eyes open to voice; Lethargic (06/19/17 1601)  Orientation Level: Unable to verbalize (06/19/17 1601)  Cognition: Follows commands (06/19/17 1601)  LUE Motor Response: Purposeful (06/19/17 1537)  LLE Motor Response: Purposeful (06/19/17 1537)  RUE Motor Response: Purposeful (06/19/17 1601)  RLE Motor Response: Purposeful (06/19/17 1601)   At baseline    Mental Status and Level of Consciousness: Arousable    Pulmonary Status:   O2 Device: Room air (06/19/17 1601)   Adequate oxygenation and airway patent    Complications related to anesthesia: None    Post-anesthesia assessment completed.  No concerns    Signed By: Lucio Gallegos MD     June 19, 2017

## 2017-06-19 NOTE — PROGRESS NOTES
Progress Note    Patient: Marcus Hart. MRN: [id number]  SSN: [id number]    YOB: 1980  Age: 39 y.o. Sex: male      Admit Date: 6/15/2017    LOS: 4 days     Subjective:     Patient is currently in [address] [place] [address] for biopsy and will be seen tomorrow. Chart reviewed and no overnight changes. Plan as for now was  . If biopsy shows sarcoid then will plan on increasing Imuran and change to prednisone. Continue decadron for now. Objective:     Vitals:    06/18/17 0622 06/18/17 1829 06/19/17 0526 06/19/17 0845   BP: 107/67 106/70 101/66 121/77   Pulse: (!) 51 86 72 63   Resp: 16 16 16 14   Temp: 97.4 °F (36.3 °C) 97.5 °F (36.4 °C)  98.5 °F (36.9 °C)   SpO2: 100% 97% 100% 99%   Weight:       Height:            Intake and Output:  Current Shift:    Last three shifts: 06/17 1901 - 06/19 0700  In: 1200 [I.V.:1200]  Out: 500 [Urine:500]    Physical Exam:   Patient in OR    Lab/Data Review: All lab results for the last 24 hours reviewed. Assessment/Plan: 1. Abnormal MRI brain with mass thalamus and midbrain, increasing. Biopsy today. 2. Diplopia secondary #1  3.  Hemiparesis secondary #1    Plan: Continue steroids and await biopsy     Principal Problem:    Brain mass (6/18/2017)    Active Problems:    Neurosarcoidosis ([facility] [address] (10/17/2016)      Abnormal MRI scan, head (6/15/2017)      Diplopia (6/15/2017)            Signed By: Fela Bonilla MD     June 19, 2017

## 2017-06-19 NOTE — PROGRESS NOTES
Neurosurgery: moving right side less then the left. Ct scan shows targeted area is exactly where planned. No hemorrhage. Suspect still seeing the effects of anesthesia and swelling.

## 2017-06-19 NOTE — INTERVAL H&P NOTE
H&P Update:  Elroy Llamas. was seen and examined. History and physical has been reviewed. The patient has been examined.  There have been no significant clinical changes since the completion of the originally dated History and Physical.    Signed By: Jeffrey Sherman MD     June 19, 2017 12:17 PM

## 2017-06-20 NOTE — PROGRESS NOTES
Problem: Dysphagia (Adult)  Goal: *Acute Goals and Plan of Care (Insert Text)  Recommendations:  Diet: NPO with alternate means nutrition  Meds: via IV or NGT  Aspiration Precautions  Oral Care TID  Other: MBS when appropriate    Goals: Patient will:  1. Tolerate PO trials with 0 s/s overt distress in 4/5 trials  2. Utilize compensatory swallow strategies/maneuvers (decrease bite/sip, size/rate, alt. liq/sol) with min cues in 4/5 trials  3. Perform oral-motor/laryngeal exercises to increase oropharyngeal swallow function with min cues  4. Complete an objective swallow study (i.e., MBSS) to assess swallow integrity, r/o aspiration, and determine of safest LRD, min A     Outcome: Progressing Towards Goal  SPEECH LANGUAGE PATHOLOGY BEDSIDE SWALLOW EVALUATION     Patient: Nilsa Gary (40 y.o. male)  Date: 6/20/2017  Primary Diagnosis: Neurosarcoidosis (HCC)  left tere;amus and peduncle lesion  i63.8  Procedure(s) (LRB):  image guided left thalamus biopsy (N/A) 1 Day Post-Op   Precautions: Aspiration, Fall      ASSESSMENT :Fair  Based on the objective data described below, the patient presents with severe oropharyngeal dysphagia. Pt sleeping upon arrival, alerted with tactile stimuli. Pt A&O x 4. Functional expressive communication. OM exam revealed structures grossly intact for PO intake. Pt accepted SLP-fed thin liquid via cup sip, demo immediate cough and drooling. SLP suctioned. [place]-thick liquid via cup sip, severe oral delay (>10 seconds), required verbal cues and tactile stimulation of BOT to initiate swallow. Puree trial, pt demo poor oral control,  and absent swallow. SLP suctioned. Recommend NPO with alternate means of nutrition and MBS when appropriate. D/w RNRonny. Will follow up next day. Patient will benefit from skilled intervention to address the above impairments.   Patients rehabilitation potential is considered to be Fair  Factors which may influence rehabilitation potential include:   [ ]            None noted  [ ]            Mental ability/status  [X]            Medical condition  [ ]            Home/family situation and support systems  [ ]            Safety awareness  [ ]            Pain tolerance/management  [ ]            Other:        PLAN :  Recommendations and Planned Interventions:  NPO, alt source of nutrition   Frequency/Duration: Patient will be followed by speech-language pathology 1-2 times per day/4-7 days per week to address goals. Discharge Recommendations: Inpatient Rehab       SUBJECTIVE:   Patient stated My son. OBJECTIVE:       Past Medical History:   Diagnosis Date    Arm pain      CVA (cerebrovascular accident) ([facility] [address] 2012    Meningitis      Meningitis      Neurosarcoidosis ([facility] [address] 2012     Past Surgical History:   Procedure Laterality Date    HX HIP REPLACEMENT   2010    HX WISDOM TEETH EXTRACTION         Prior Level of Function/Home Situation: Independent  Home Situation  Home Environment: Private residence  One/Two Story Residence: Two story  Living Alone: No  Support Systems: Family member(s)  Patient Expects to be Discharged to[de-identified] Private residence  Current DME Used/Available at Home: [employer], straight, Walker, rolling  Diet prior to admission: Regular  Current Diet: NPO        Cognitive and Communication Status:  Neurologic State: Alert  Orientation Level: Oriented X4  Cognition: Follows commands  Perception: Appears intact  Perseveration: No perseveration noted  Safety/Judgement: Fall prevention  Oral Assessment:  Oral Assessment  Labial: Decreased rate;Decreased seal  Dentition: Intact  Oral Hygiene: Good  Lingual: Decreased rate;Decreased strength; Incoordinated  Velum: No impairment  Mandible: No impairment  Gag Reflex: No impairment  P.O. Trials:  Patient Position: HOB 30  Vocal quality prior to P.O.: Low volume  Consistency Presented: Nectar thick liquid;Puree; Thin liquid  How Presented: SLP-fed/presented;Cup/sip     Bolus Acceptance: No impairment  Bolus Formation/Control: Impaired  Type of Impairment: Delayed;Drooling; Incomplete;Lip closure;Mastication  Propulsion: Delayed (# of seconds); Absent  Oral Residue: Greater than 50% of bolus; Lingual  Initiation of Swallow: Delayed (# of seconds)  Laryngeal Elevation: Weak  Aspiration Signs/Symptoms: Strong cough  Pharyngeal Phase Characteristics: Suspected pharyngeal residue  Effective Modifications: None  Cues for Modifications: Moderate        Oral Phase Severity: Severe  Pharyngeal Phase Severity : Severe     GCODESwallowing:  Swallow Current Status CN= 100%   Swallow Goal Status CK= 40-59%     The severity rating is based on the following outcomes:  SONNY Noms Swallow Level 1              Clinical Judgement     PAIN:  Start of Eval: 0  End of Eval: 0     After treatment:   [ ]            Patient left in no apparent distress sitting up in chair  [X]            Patient left in no apparent distress in bed  [X]            Call bell left within reach  [X]            Nursing notified  [ ]            Family present  [ ]            Caregiver present  [ ]            Bed alarm activated      COMMUNICATION/EDUCATION:   [X]            Aspiration precautions; swallow safety; compensatory techniques. [ ]            Patient/family have participated as able in goal setting and plan of care. [ ]            Patient/family agree to work toward stated goals and plan of care. [ ]            Patient understands intent and goals of therapy; neutral about participation. [ ]            Patient unable to participate in goal setting/plan of care; educ ongoing with interdisciplinary staff  [ ]             Posted safety precautions in patient's room.      Thank you for this referral.  Kathy Ricci, SLP Intern  Ph: [phone]

## 2017-06-20 NOTE — PROGRESS NOTES
Problem: Mobility Impaired (Adult and Pediatric)  Goal: *Acute Goals and Plan of Care (Insert Text)  Physical Therapy Goals  Initiated 6/20/2017 and to be accomplished within 7 day(s)  1. Patient will move from supine to sit and sit to supine in bed with modified independence. 2. Patient will transfer from bed to chair and chair to bed with supervision using the least restrictive device. 3. Patient will perform sit to stand with supervision. 4. Patient will ambulate with supervision for 50 feet with the least restrictive device. 5. Patient will ascend/descend 4 stairs with handrail(s) with supervision/set-up. Outcome: Progressing Towards Goal  PHYSICAL THERAPY EVALUATION     Patient: Kerry Muro (40 y.o. male)  Date: 6/20/2017  Primary Diagnosis: Neurosarcoidosis (HCC)  left tere;amus and peduncle lesion  i63.8  Procedure(s) (LRB):  image guided left thalamus biopsy (N/A) 1 Day Post-Op   Precautions: Fall      ASSESSMENT :  Patient requires between maximal assistance and minimal assistance/contact guard assist for bed mobility, transfers and ambulation. Drowsy throughout session. Opens eyes to voice. Able to nod yes/no to questions. Follows commands. Active movement of BLE/BUE. Supine to sit transfer mod A; cues for sequencing. Seated balance at edge of bed poor. Posture lean to right; forward head with cervical flexion. Corrected patient to midline posture in sitting; unable to maintain without physical assist.  Strength in BLE equal 4+/5. Returned to supine in bed mod A; max A for scooting to reposition in bed. All needs present; RN Ishan Lea present at end of session. Vitals pre activity /61, HR 51, O2 sat 100%. Post activity vitals /78, HR 52, O2 sat 100%. Deferred OOB activity secondary to drowsiness.    Patient presents with deficits in:  Bed Mobility, Transfers, Gait, Strength, Balance and Stairs        Patient will benefit from skilled intervention to address the above impairments. Patients rehabilitation potential is considered to be Fair  Factors which may influence rehabilitation potential include:   [ ]         None noted  [ ]         Mental ability/status  [X]         Medical condition  [ ]         Home/family situation and support systems  [ ]         Safety awareness  [ ]         Pain tolerance/management  [ ]         Other:        PLAN :  Recommendations and Planned Interventions:  [X]           Bed Mobility Training             [X]    Neuromuscular Re-Education  [X]           Transfer Training                   [ ]    Orthotic/Prosthetic Training  [X]           Gait Training                          [ ]    Modalities  [X]           Therapeutic Exercises          [ ]    Edema Management/Control  [X]           Therapeutic Activities            [X]    Patient and Family Training/Education  [ ]           Other (comment):     Frequency/Duration: Patient will be followed by physical therapy 4-7 times a week to address goals. Discharge Recommendations: Rehab  Further Equipment Recommendations for Discharge: straight cane and rolling walker; patient has       SUBJECTIVE:   Agreeable to PT with head nod \"yes\"      OBJECTIVE DATA SUMMARY:       Past Medical History:   Diagnosis Date    Arm pain      CVA (cerebrovascular accident) ([facility] [address] 2012    Meningitis      Meningitis      Neurosarcoidosis ([facility] [address] 2012     Past Surgical History:   Procedure Laterality Date    HX HIP REPLACEMENT   2010    HX WISDOM TEETH EXTRACTION         Barriers to Learning/Limitations: None  Compensate with: visual, verbal, tactile, kinesthetic cues/model     G CODES:Mobility  Current  CM= 80-99%   Goal  CK= 40-59%.   The severity rating is based on the Other [facility] Sitting Balance Scale 1/5        Eval Complexity: History: MEDIUM  Complexity : 1-2 comorbidities / personal factors will impact the outcome/ POC Exam:MEDIUM Complexity : 3 Standardized tests and measures addressing body structure, function, activity limitation and / or participation in recreation  Presentation: MEDIUM Complexity : Evolving with changing characteristics  Clinical Decision Making:Medium Complexity [facility] Sitting Balance Scale 1/5 Overall Complexity:MEDIUM     [facility] Sitting Balance Scale 1/5  0: Pt performs 25% or less of sitting activity (Max assist) CN, 100% impaired. 1: Pt supports self with upper extremities but requires therapist assistance. Pt performs 25-50% of effort (Mod assist) CM, 80% to <100% impaired. 1+: Pt supports self with upper extremities but requires therapist assistance. Pt performs >50% effort. (Min assist). CL, 60% to <80% impaired. 2: Pt supports self independently with both upper extremities. CL, 60% to <80% impaired. 2+: Pt support self independently with 1 upper extremity. CK, 40% to <60% impaired. 3: Pt sits without upper extremity support for up to 30 seconds. CK, 40% to <60% impaired. 3+: Pt sits without upper extremity support for 30 seconds or greater. CJ, 20% to <40% impaired. 4: Pt moves and returns trunkal midpoint 1-2 inches in one plane. CJ, 20% to <40% impaired. 4+: Pt moves and returns trunkal midpoint 1-2 inches in multiple planes. CI, 1% to <20% impaired. 5: Pt moves and returns trunkal midpoint in all planes greater than 2 inches. CH, 0% impaired. [facility] Standing Balance Scale  0: Pt performs 25% or less of standing activity (Max assist) CN, 100% impaired. 1: Pt supports self with upper extremities but requires therapist assistance. Pt performs 25-50% of effort (Mod assist) CM, 80% to <100% impaired. 1+: Pt supports self with upper extremities but requires therapist assistance. Pt performs >50% effort. (Min assist). CL, 60% to <80% impaired. 2: Pt supports self independently with both upper extremities (walker, crutches, parallel bars). CL, 60% to <80% impaired.   2+: Pt support self independently with 1 upper extremity (cane, crutch, 1 parallel bar). CK, 40% to <60% impaired. 3: Pt stands without upper extremity support for up to 30 seconds. CK, 40% to <60% impaired. 3+: Pt stands without upper extremity support for 30 seconds or greater. CJ, 20% to <40% impaired. 4: Pt independently moves and returns center of gravity 1-2 inches in one plane. CJ, 20% to <40% impaired. 4+: Pt independently moves and returns center of gravity 1-2 inches in multiple planes. CI, 1% to <20% impaired. 5: Pt independently moves and returns center of gravity in all planes greater than 2 inches. CH, 0% impaired. Prior Level of Function/Home Situation:   Home Situation  Home Environment: Private residence  One/Two Story Residence: Two story  Living Alone: No  Support Systems: Family member(s)  Patient Expects to be Discharged to[de-identified] Private residence  Current DME Used/Available at Home: [address] [facility] [place], straight, Walker, rolling  Critical Behavior:  Neurologic State: Alert;Eyes open spontaneously  Orientation Level: Oriented X4  Cognition: Follows commands  Strength:    Strength: Generally decreased, functional (BLE)  Manual Muscle Testing (LE)                                                     R                     L                     Hip Flexion:                           4/5                   4/5    Knee EXT:                            4+/5                 4+/5  Knee FLEX:                            4/5                   4/5      Ankle DF:                           5/5                   5/5  _________________________________________________   Tone & Sensation:   Tone: Normal  Sensation: Intact (BLE)  Range Of Motion:  AROM: Generally decreased, functional (BLE)  Functional Mobility:  Bed Mobility:  Rolling: Minimum assistance  Supine to Sit: Moderate assistance  Sit to Supine:  Moderate assistance  Scooting: Maximum assistance  Transfers:  Sit to Stand:  (not tested)  Balance:   Sitting: Impaired  Sitting - Static: Occassional  Sitting - Dynamic: Poor (constant support)  Standing:  (not tested)  Ambulation/Gait Training:  Not tested  Therapeutic Exercises:   Sitting EOB 3 min  Activity Tolerance:   Fair  Please refer to the flowsheet for vital signs taken during this treatment. After treatment:   [ ]         Patient left in no apparent distress sitting up in chair  [X]         Patient left in no apparent distress in bed  [X]         Call bell left within reach  [X]         Nursing notified and present  [ ]         Caregiver present  [ ]         Bed alarm activated      COMMUNICATION/EDUCATION:   [X]         Fall prevention education was provided and the patient/caregiver indicated understanding. [X]         Patient/family have participated as able in goal setting and plan of care. [X]         Patient/family agree to work toward stated goals and plan of care. [ ]         Patient understands intent and goals of therapy, but is neutral about his/her participation. [ ]         Patient is unable to participate in goal setting and plan of care. Patient educated on the role of physical therapy during the acute stay  and the importance of mobility. ERIK.        Thank you for this referral.  Edie Hendricks, PT   Time Calculation: 20 mins

## 2017-06-20 NOTE — PROGRESS NOTES
Neurosurgery Progress Note; post op day 1  S: mild headache, waking up from anesthesia. O; ct scan x 2 post op biopsy site accurate no significant hemorrhage ? Area on coronal views. Afebrile and vitals are stable. Wound dressing is intact. Will speak follows commands x 4 right side is moving better then last night. A; stable  P; observe in icu today, mobilize, dc boland path should be available on Thursday.

## 2017-06-20 NOTE — PROGRESS NOTES
Physical Exam   Skin:        Primary Nurse Sangeetha German, SHEFALI and Chele Haynes RN performed a dual skin assessment on this patient Impairment noted- see wound doc flow sheet  Cristóbal score is 3.

## 2017-06-20 NOTE — PROGRESS NOTES
Patient seen and independently examined. Agree with note. Pending biopsy results. Continue decadron for now. Neurology Progress Note    Admit Date: 6/15/2017  Length of Stay: 5  Primary Care: Celi Hernandez MD     Interim History: No overnite issues    Assessment:    Principle Problem: Brain mass     Problem List: Principal Problem:    Brain mass (6/18/2017)    Active Problems:    Neurosarcoidosis ([address] (10/17/2016)      Abnormal MRI scan, head (6/15/2017)      Diplopia (6/15/2017)        Plan:    1. Abnormal MRI brain with mass thalamus and mid-brain increasing. Biopsy yesterday- POD 1.    CT this AM.  Stable amount of ICH at biopsy site with no hematoma. From notes plan if sarcoid will increase Imuran and change decadron to prednisone. Awaiting pathology. Note results of SLP evaluation. Poor PO control with all consistencies. Will need dobhoff tube for medications and feeding. Dobhoff ordered. Results reviewed:     CT Results (most recent):    Results from Hospital Encounter encounter on 06/15/17   CT HEAD WO CONT   Narrative EXAM: CT head    INDICATION: Post brain biopsy neurologic change. COMPARISON: 6/15/2017. TECHNIQUE: Axial CT imaging of the head was performed without intravenous  contrast. Coronal and sagittal reconstructions were obtained. Dose reduction: One or more dose reduction techniques were used on this CT:  automated exposure control, adjustment of the mAs and/or kVp according to  patient's size, and iterative reconstruction techniques. The specific techniques  utilized on this CT exam have been documented in the patient's electronic  medical record.  _______________    FINDINGS:    BRAIN PARENCHYMA: Left frontal craniotomy site noted extending to the thalamic  region with air along its tract. Tiny 3 mm hemorrhagic focus is seen in the left  thalamus at the biopsy site. Rest of the brain appears unremarkable.  There is  1-2 mm left-to-right midline shift.    VENTRICLES/EXTRA-AXIAL SPACES/MENINGES: The ventricles and sulci are normal in  their size and configuration. OSSEOUS STRUCTURES: No fracture is seen. PARANASAL SINUSES/MASTOIDS: Visualized paranasal sinuses and mastoid air cells  are clear. ORBITS: The visualized orbits are unremarkable. OTHER: None.      _______________         Impression IMPRESSION:    Left frontal craniotomy site due to left thalamic biopsy with a tiny hemorrhagic  focus in the left thalamus. 1-2 mm left-to-right midline shift. Otherwise  unchanged. The study was initially interpreted by the radiology resident at the time of the  examination and the appropriate personnel informed. MRI Results (most recent):    Results from [facility] encounter on 06/15/17   MRI BRAIN W WO CONT   Narrative History: Follow-up left thalamic and midbrain lesion, preop for Stealth protocol    Comparison 6/15/2017, 3/7/2017, and 3/2/2017    PROCEDURE: Axial 3-D SPGR T1 sequences obtained before and after gadolinium  administration using Stealth protocol. FINDINGS: Since previous studies there has been interval increased size of area  of T1 hypointensity now involving nearly the entire left thalamus and extending  to the adjacent posterior limb left internal capsule. There is continued  extension of T1 hypointensity into the left midbrain and involving the left side  dorsal midbrain contiguous with aqueduct. There is some continued mild expansile  changes of left midbrain but degree of extension of signal changes and expansion  of left midbrain appears slightly less pronounced than prior studies. Several  small foci of enhancement are again noted along the inferior central aspect of  the left thalamic lesion. There is also persistent nonenhancing T1 hypointensity  within the inferior and medial right thalamus.  There is persistent mild mass  effect and effacement on aqueduct and upper fourth ventricle and inferior third  ventricle with no hydrocephalus. No other abnormal signal within the brain or abnormal enhancement. Impression IMPRESSION:    1. Left thalamic lesion involving adjacent left midbrain with small inferior  central areas of associated enhancement with surrounding mass effect. As  mentioned on most recent study there is a mixed pattern of progression of the  thalamic portion of this lesion and some improvement of the midbrain portion of  this lesion, with differential diagnosis remaining the same including glioma,  lymphoma, or tumefactive demyelination. Note that patient has history of  reported previous neurosarcoidosis, although as mentioned previously this could  be unusual manifestation. 2. Additional nonenhancing low T1 signal medial inferior right thalamus,  suggestive most likely of sequela of either previous ischemic or inflammatory  process. .        Latest Hemoglobin A1C:  Lab Results   Component Value Date/Time    Hemoglobin A1c 5.0 03/02/2017 04:04 PM       Latest Cardiology Procedure:  No results found for this or any previous visit.     Important Labs:  Lab Results   Component Value Date/Time    Folate 16.1 03/02/2017 04:50 PM     Lab Results   Component Value Date/Time    Cholesterol, total 146 11/17/2016 03:33 PM    HDL Cholesterol 39 11/17/2016 03:33 PM    LDL, calculated 81.4 11/17/2016 03:33 PM    VLDL, calculated 25.6 11/17/2016 03:33 PM    Triglyceride 128 11/17/2016 03:33 PM    CHOL/HDL Ratio 3.7 11/17/2016 03:33 PM     Lab Results   Component Value Date/Time    TSH 0.33 06/16/2017 04:35 AM    T4, Free 0.8 03/02/2017 04:50 PM     No results found for: DS35, PHEN, PHENO, PHENT, DILF, DS39, PHENY, PTN, VALF2, VALAC, VALP, VALPR, DS6, CRBAM, CRBAMP, CARB2, XCRBAM    Discussed with: patient's father    Allergies: No Known Allergies      Vital Signs:   Visit Vitals    /65    Pulse (!) 48    Temp 97.6 °F (36.4 °C)    Resp 11    Ht 6' 2\" (1.88 m)    Wt 76.7 kg (169 lb 1.5 oz)    SpO2 100%  BMI 21.71 kg/m2        Neurological examination:    Appearance: In no acute distress, well developed, well nourished, cooperative   Cardiovascular: Heart is iram rate and rhythm, peripheral edema to bilateral knees.  Mental status examination: Drowsy but oriented X 3. Normal speech and language. Some slurring of words. Decreased attention.  Cranial Nerves:         II: visual fields Full to confrontation   II: pupils Equal, round, reactive to light Disconjugate gaze at rest.     III,VII: ptosis none   III,IV,VI: extraocular muscles  Full ROM   V: facial light touch sensation  normal   V,VII: corneal reflex     VII: facial muscle function  normal   VIII: hearing    IX: soft palate elevation  normal   X phonation soft   XI: sternocleidomastoid strength 5/5   XII: tongue strength  normal      Motor exam: Station, gait:  deferred. Moves all 4 extremities well, symmetrically. Left side spastic with left hand contracture. Unable to extend fingers left hand. 5/5 to left bicep and tricep. 5/5 on right side except anterior tibial 3+/5.  Sensory: Intact to light touch      Reflexes:  2+ right arm and 1+ left arm. Left knee reflex 2+. Ankle reflexes 1+ symmetric. Left  toe up going. Decreased pinprick right arm and leg. PMH:   Past Medical History:   Diagnosis Date    Arm pain     CVA [facility]cerebrovascular accident) ([facility] 2012    Meningitis     Meningitis     Neurosarcoidosis ([facility] 2012      FH: History reviewed. No pertinent family history.    SH:   Social History     Social History    Marital status: SINGLE     Spouse name: N/A    Number of children: N/A    Years of education: N/A     Social History Main Topics    Smoking status: Former Smoker     Packs/day: 0.25     Quit date: 4/26/2017    Smokeless tobacco: Never Used    Alcohol use No    Drug use: No    Sexual activity: Not Currently     Other Topics Concern    None     Social History Narrative Medications:    [x] REVIEWED  Current Facility-Administered Medications   Medication    sodium chloride (NS) flush 5-10 mL    sodium chloride (NS) flush 5-10 mL    acetaminophen (TYLENOL) tablet 650 mg    oxyCODONE-acetaminophen (PERCOCET) 5-325 mg per tablet 1 Tab    HYDROmorphone (PF) (DILAUDID) injection 1 mg    ondansetron (ZOFRAN) injection 4 mg    niCARdipine (CARDENE) 25 mg in 0.9% sodium chloride 250 mL infusion    dexamethasone (DECADRON) 4 mg/mL injection 6 mg    acetaminophen-codeine (TYLENOL #3) per tablet 1 Tab    azaTHIOprine (IMURAN) tablet 50 mg    buPROPion XL (WELLBUTRIN XL) tablet 300 mg    nystatin (MYCOSTATIN) 100,000 unit/mL oral suspension 500,000 Units    polyethylene glycol (MIRALAX) packet 17 g    0.9% sodium chloride infusion    ondansetron (ZOFRAN) injection 4 mg     Data:      [x] REVIEWED  Recent Results (from the past 24 hour(s))   CULTURE, FUNGUS    Collection Time: 06/19/17  1:42 PM   Result Value Ref Range    Special Requests: NO SPECIAL REQUESTS      FUNGUS SMEAR NO FUNGAL ELEMENTS SEEN      Culture result: PENDING    PROTHROMBIN TIME + INR    Collection Time: 06/20/17  4:30 AM   Result Value Ref Range    Prothrombin time 14.8 11.5 - 15.2 sec    INR 1.2 0.8 - 1.2         Shana Mckeon NP

## 2017-06-20 NOTE — PROGRESS NOTES
Problem: Pain  Goal: *Control of Pain  Outcome: Not Progressing Towards Goal  Pt reports pain unchanged, remains @8/10 after med adm. C/o wrist and knee pain. Denies any headache.

## 2017-06-20 NOTE — PROGRESS NOTES
Progress Note      Patient: Henrry Umanozr.                Sex: male          DOA: 6/15/2017       YOB: 1980      Age:  39 y.o.        LOS:  LOS: 5 days               Subjective:   No complaints today, NAD, alert awake    Objective:      Visit Vitals    /65    Pulse (!) 48    Temp 97.6 °F (36.4 °C)    Resp 11    Ht 6' 2\" (1.88 m)    Wt 76.7 kg (169 lb 1.5 oz)    SpO2 100%    BMI 21.71 kg/m2       Physical Exam:  Pt NAD  Heart reg rate ad rhythm   Lungs good breath sounds heard   Abdomen soft and nontender   Neuro dysarthria present left hand flexure contracture from his previous cva         Lab/Data Reviewed:  CMP:   No results found for: NA, K, CL, CO2, AGAP, GLU, BUN, CREA, GFRAA, GFRNA, CA, MG, PHOS, ALB, TBIL, TP, ALB, GLOB, AGRAT, SGOT, ALT, GPT  CBC:   No results found for: WBC, HGB, HGBEXT, HCT, HCTEXT, PLT, PLTEXT, HGBEXT, HCTEXT, PLTEXT        Assessment/Plan     Active Problems:    Neurosarcoidosis (HCC) (10/17/2016) vs glioma or other low grade tumor       Abnormal MRI scan, head (6/15/2017)      Diplopia (6/15/2017)        Plan:     Brain mass vs neurosarcoid-  S/p brain biopsy, per neurosurgery continue ICU monitoring  Hx CVA with left hemiparesis  Hx of aseptic necrosis R hip

## 2017-06-20 NOTE — ROUTINE PROCESS
9756- Bedside shift change report given to [address] (oncoming nurse) by Katie Menezes (offgoing nurse). Report included the following information SBAR, MAR and Cardiac Rhythm Sinus Medardo/NSR.     22962- Patient NPO status presented at rounds to ICU team. Made aware that patient unable to tolerate PO medications and fluids/food and that NPO status put in by speech therapy. 1335- 3 unsuccessful attempts made at placing  andrea for feedings and medications. Floyd Garcia made aware    - Bedside shift change report given to Brianna Dotson RN (oncoming nurse) by [address] (offgoing nurse). Report included the following information SBAR, MAR and Cardiac Rhythm Sinus Medardo/NSR.

## 2017-06-20 NOTE — PROGRESS NOTES
OT order received and chart reviewed. 1225: 1st attempt; Fay Martinez NP present in room at this time. 1330: 2nd attempt; nursing present attempting to place NG tube. 1402: 3rd attempt; Abhijeet Modi RN and Zuleika Mota RN present attempting to insert NG tube. Will follow up. Thank you.     Emily Kumar MS OTR/L  Office Ext: [phone]  Pager: [phone]

## 2017-06-20 NOTE — PROGRESS NOTES
2000 Pt refused to be repositioned, nods in agreement to be repositioned in an hour. Mother at bedside. 2100 Assisted with regular diet tray. Pt unable to swallow food and began to drool and sweat after drinking Ginger Ale. Suctioned pt with remaining food in mouth. Lungs clear to auscultation after event. Dysphagia screen done. Pt satting 98% on RA.  2200 Resting in bed with eyes closed. Easily awaken. Respirations even and unlabored. No acute distress noted. Able to spit out Nystatin liquid. 2300 RUE  increasing strength noted. L>R.  0000 Sleeping. Respirations even and unlabored. No acute distress noted. 0200 Pt resting, awakens easily. 0400 To CT via hospital bed, accompanied by 2 RNs. Pt remains on continuous cardiac monitor, BP, and pulse ox. 0500 Notified Dr. Sam Perry about pt unable to swallow regular diet. New order swallow eval received. 0715 Bedside and Verbal shift change report given to Shawna Flores (oncoming nurse) by Justino Colin RN (offgoing nurse). Report included the following information SBAR, MAR, Accordion and Cardiac Rhythm iram.

## 2017-06-21 NOTE — PROGRESS NOTES
Neurosurgery Progress Note;  S: still sleepy but will awake and respond, failed swallowing evaluation and ng tube unable to be placed on decadron missed imuran   Olafebrile and vitals are stable. Wound is clean and dry awakes to stimulation oriented x 3 moving right side much better 2 post op ct scans noted.    A; improving-suspect underlying process is contributing to the slow wake up  P; repeat swallowing evaluation, picc line will check path tomorrow

## 2017-06-21 NOTE — PROGRESS NOTES
1046: 1st attempt for OT evaluation. Pt off floor for MBS. 1142: still off floor. 1205: 3rd attempt; pt refusing. 1314: 4th attempt; pt continues to refuse. Will follow up tomorrow. Thank you.     Josué Martinez MS OTR/L  Office Ext: [phone]  Pager: [phone]

## 2017-06-21 NOTE — PROGRESS NOTES
Problem: Surgical Pathway Post-Op Day 1  Goal: Nutrition/Diet  Outcome: Not Progressing Towards Goal  Pt is Dysphasic will require tube feeds.       Goal: *Tolerating diet  Outcome: Not Progressing Towards Goal  Pt is NPO, failed swallow eval

## 2017-06-21 NOTE — PROGRESS NOTES
Problem: Dysphagia (Adult)  Goal: *Acute Goals and Plan of Care (Insert Text)  Recommendations:  Diet: NPO with alternate means nutrition  Meds: via IV or NGT  Aspiration Precautions  Oral Care TID  Other: MBS    Goals: Patient will:  1. Tolerate PO trials with 0 s/s overt distress in 4/5 trials  2. Utilize compensatory swallow strategies/maneuvers (decrease bite/sip, size/rate, alt. liq/sol) with min cues in 4/5 trials  3. Perform oral-motor/laryngeal exercises to increase oropharyngeal swallow function with min cues  4. Complete an objective swallow study (i.e., MBSS) to assess swallow integrity, r/o aspiration, and determine of safest LRD, min A     Outcome: Progressing Towards Goal  SPEECH LANGUAGE PATHOLOGY DYSPHAGIA TREATMENT     Patient: Nilsa Gary (40 y.o. male)  Date: 6/21/2017  Diagnosis: Neurosarcoidosis (HCC)  left tere;amus and peduncle lesion  i63.8 Brain mass  Procedure(s) (LRB):  image guided left thalamus biopsy (N/A) 2 Days Post-Op  Precautions: aspiration Fall      ASSESSMENT:  Patient seen for swallowing therapy this day with mother at bedside. Patient is sitting up in chair throughout session. Patient slowly responding to questions. Patient with honey thick liquids and pudding trials with VERY delayed swallow (~15seconds), with strong hyolaryngeal elevation upon palpation. Patient with x1 strong cough, however suspect this is due to premature spillage into airway with severity of swallow delay. Recommend MBS this day with further recommendations to follow. Progression toward goals:  [ ]         Improving appropriately and progressing toward goals  [X]         Improving slowly and progressing toward goals  [ ]         Not making progress toward goals and plan of care will be adjusted       PLAN:  Recommendations and Planned Interventions:  MBS with further recommendations  Patient continues to benefit from skilled intervention to address the above impairments.  Continue treatment per established plan of care. Discharge Recommendations:  [facility] vs rehab       SUBJECTIVE:   Patient stated [place]. OBJECTIVE:   Cognitive and Communication Status:  Neurologic State: Alert  Orientation Level: Oriented X4  Cognition: Follows commands, Appropriate decision making, Appropriate for age attention/concentration  Perception: Appears intact  Perseveration: No perseveration noted  Safety/Judgement: Fall prevention  Dysphagia Treatment:  Oral Assessment:  Oral Assessment  Labial: Decreased rate, Decreased seal  Dentition: Intact  Oral Hygiene: Good  Lingual: Decreased rate, Decreased strength, Incoordinated  Velum: No impairment  Mandible: No impairment  Gag Reflex: No impairment  P.O. Trials:              Patient Position: [facility] 30              Vocal quality prior to P.O.: Low volume              Consistency Presented: Nectar thick liquid, Puree, Thin liquid              How Presented: SLP-fed/presented, Cup/sip                             Bolus Acceptance: No impairment              Bolus Formation/Control: Impaired              Type of Impairment: Delayed, Drooling, Incomplete, Lip closure, Mastication              Propulsion: Delayed (# of seconds), Absent              Oral Residue: Greater than 50% of bolus, Lingual              Initiation of Swallow: Delayed (# of seconds)              Laryngeal Elevation: Weak              Aspiration Signs/Symptoms: Strong cough              Pharyngeal Phase Characteristics: Suspected pharyngeal residue              Effective Modifications: None              Cues for Modifications:  Moderate                                Oral Phase Severity: Severe              Pharyngeal Phase Severity : Severe                PAIN:  Start of Tx: 0  End of Tx: 0      After treatment:   [ ]              Patient left in no apparent distress sitting up in chair  [X]              Patient left in no apparent distress in bed  [X]              Call bell left within reach  [X]              Nursing notified  [ ]              Family present  [ ]              Caregiver present  [ ]              Bed alarm activated         COMMUNICATION/EDUCATION:   [X]        Aspiration precautions; swallow safety; compensatory techniques  [ ]        Patient unable to participate in education; education ongoing with staff  [ ]         Posted safety precautions in patient's room.   [ ]         Oral-motor/laryngeal strengthening exercises        Cris Young [facility] SLP  Time Calculation: 15 mins

## 2017-06-21 NOTE — PROGRESS NOTES
Physical Exam   Skin:        Primary Nurse Sunita Black RN and Unique Horner RN performed a dual skin assessment on this patient Impairment noted- see wound doc flow sheet  Cristóbal score is 14

## 2017-06-21 NOTE — PROGRESS NOTES
Chart reviewed. Case management following for needs. Awaiting PT/OT evals to assist with disposition needs. Jennie Peacock RN,ext. [phone].

## 2017-06-21 NOTE — ROUTINE PROCESS
0710- Bedside shift change report given to Windy Mares RN (oncoming nurse) by Bernadine Vera RN (offgoing nurse). Report included the following information SBAR, MAR and Cardiac Rhythm Sinus Medardo/ NSR.     0900- Patient assisted up and into chair    0940- Patient being taken to [facility] by transport at this time    1030- Patient to be transported from [facility] to 2100 and bedside report to be given    TRANSFER - OUT REPORT:    Verbal report given to [facility]-BRANDAN RN(name) on [employer].  being transferred to [facility](unit) for routine progression of care       Report consisted of patients Situation, Background, Assessment and   Recommendations(SBAR). Information from the following report(s) SBAR, MAR and Cardiac Rhythm Sinus Medardo/NSR was reviewed with the receiving nurse. Lines:   Peripheral IV 06/19/17 Right External jugular (Active)   Site Assessment Clean, dry, & intact 6/21/2017 10:00 AM   Phlebitis Assessment 0 6/21/2017  7:15 AM   Infiltration Assessment 0 6/21/2017  7:15 AM   Dressing Status Clean, dry, & intact 6/21/2017  7:15 AM   Dressing Type Tape;Transparent 6/21/2017  7:15 AM   Hub Color/Line Status Pink;Capped;Flushed 6/21/2017  7:15 AM   Action Taken Open ports on tubing capped 6/21/2017  7:15 AM   Alcohol Cap Used Yes 6/21/2017  7:15 AM       Peripheral IV 06/20/17 Right Forearm (Active)   Site Assessment Clean, dry, & intact 6/21/2017 10:00 AM   Phlebitis Assessment 0 6/21/2017  7:15 AM   Infiltration Assessment 0 6/21/2017  7:15 AM   Dressing Status Clean, dry, & intact 6/21/2017  7:15 AM   Dressing Type Tape;Transparent 6/21/2017  7:15 AM   Hub Color/Line Status Pink; Infusing;Flushed 6/21/2017  7:15 AM   Action Taken Open ports on tubing capped 6/21/2017  7:15 AM   Alcohol Cap Used Yes 6/21/2017  7:15 AM        Opportunity for questions and clarification was provided.       Patient transported with:   [employer]

## 2017-06-21 NOTE — PROGRESS NOTES
Speech Therapy Note:  MBS completed with moderate penetration with thin liquids via cup; no penetration with thin via spoon. Recommend puree/thin via TSP WHEN ALERT ONLY. Check oral cavity after all PO intake. Meds crushed in puree.   Claudine Hsu M.S. [facility] SLP  Ph: [phone]  Pager: [phone]

## 2017-06-21 NOTE — PROGRESS NOTES
1316: 2nd PT attempt. Patient refusing PT at this time. Educated on importance of activity and role of PT in the hospital. Patient verbalized understanding. Requesting lunch tray to be set up. RN Shayla Cassette report patient may have lunch tray but no drink without assistance per diet orders. Set up tray without liquids. All needs in reach. 1033: 1st attempt PT. Patient off floor at [facility]. Will re-attempt PT as schedule permits.      Thank you,     Shree Lai, PT, DPT

## 2017-06-21 NOTE — PROGRESS NOTES
Neurology Progress Note    Admit Date: 6/15/2017  Length of Stay: 6  Primary Care: Kirt Resendez MD     Interim History: No overnite issues. Biopsy of thalamus and peduncle lesion shows perivascular cuffing with lymphocytes and acute inflammatory changes. No granulomatous changes as seen in sarcoid and no viral cytophatic chagnes. Sample of the biopsy was sent to Dr. Iggy Goldsmith at [facility] for further testing. Assessment:    Principle Problem: Brain mass     Problem List: Principal Problem:    Brain mass (6/18/2017)    Active Problems:    Neurosarcoidosis ([address] (10/17/2016)      Abnormal MRI scan, head (6/15/2017)      Diplopia (6/15/2017)        Plan:    1. Abnormal MRI brain with mass thalamus and mid-brain increasing. Biopsy yesterday- POD 2. CT brain yesterday with stable amount of ICH at biopsy site with no hematoma. From notes plan if sarcoid will increase Imuran and change decadron to prednisone. , but biopsy was not that helpful. Discussed with Dr. Salas Aayla . I think at this point he doesn't need to stay waiting for biopsy results, but may benefit from LP to see if helps us differentiate between inflammatory, infectious, then consider SPECT as Outpatient. Monitor swallowing. Recommend: LP under fluoroscopy, cell count, protein. Glucose ( needs serum glucose as well prior to LP), MS panel ( especially IGG and OCB, but order all the panel), cytology, COLETTE, Mycoplasma, bacterial, viral and fungal cultures, CSF ACE, CSF VDRL, and hold extra fluid. Results reviewed:     CT Results (most recent):    Results from Hospital Encounter encounter on 06/15/17   CT HEAD WO CONT   Narrative EXAM: CT head    INDICATION: Post brain biopsy neurologic change. COMPARISON: 6/15/2017. TECHNIQUE: Axial CT imaging of the head was performed without intravenous  contrast. Coronal and sagittal reconstructions were obtained.     Dose reduction: One or more dose reduction techniques were used on this CT:  automated exposure control, adjustment of the mAs and/or kVp according to  patient's size, and iterative reconstruction techniques. The specific techniques  utilized on this CT exam have been documented in the patient's electronic  medical record.  _______________    FINDINGS:    BRAIN PARENCHYMA: Left frontal craniotomy site noted extending to the thalamic  region with air along its tract. Tiny 3 mm hemorrhagic focus is seen in the left  thalamus at the biopsy site. Rest of the brain appears unremarkable. There is  1-2 mm left-to-right midline shift. VENTRICLES/EXTRA-AXIAL SPACES/MENINGES: The ventricles and sulci are normal in  their size and configuration. OSSEOUS STRUCTURES: No fracture is seen. PARANASAL SINUSES/MASTOIDS: Visualized paranasal sinuses and mastoid air cells  are clear. ORBITS: The visualized orbits are unremarkable. OTHER: None.      _______________         Impression IMPRESSION:    Left frontal craniotomy site due to left thalamic biopsy with a tiny hemorrhagic  focus in the left thalamus. 1-2 mm left-to-right midline shift. Otherwise  unchanged. The study was initially interpreted by the radiology resident at the time of the  examination and the appropriate personnel informed. MRI Results (most recent):    Results from [place] encounter on 06/15/17   MRI BRAIN W WO CONT   Narrative History: Follow-up left thalamic and midbrain lesion, preop for Stealth protocol    Comparison 6/15/2017, 3/7/2017, and 3/2/2017    PROCEDURE: Axial 3-D SPGR T1 sequences obtained before and after gadolinium  administration using Stealth protocol. FINDINGS: Since previous studies there has been interval increased size of area  of T1 hypointensity now involving nearly the entire left thalamus and extending  to the adjacent posterior limb left internal capsule.  There is continued  extension of T1 hypointensity into the left midbrain and involving the left side  dorsal midbrain contiguous with aqueduct. There is some continued mild expansile  changes of left midbrain but degree of extension of signal changes and expansion  of left midbrain appears slightly less pronounced than prior studies. Several  small foci of enhancement are again noted along the inferior central aspect of  the left thalamic lesion. There is also persistent nonenhancing T1 hypointensity  within the inferior and medial right thalamus. There is persistent mild mass  effect and effacement on aqueduct and upper fourth ventricle and inferior third  ventricle with no hydrocephalus. No other abnormal signal within the brain or abnormal enhancement. Impression IMPRESSION:    1. Left thalamic lesion involving adjacent left midbrain with small inferior  central areas of associated enhancement with surrounding mass effect. As  mentioned on most recent study there is a mixed pattern of progression of the  thalamic portion of this lesion and some improvement of the midbrain portion of  this lesion, with differential diagnosis remaining the same including glioma,  lymphoma, or tumefactive demyelination. Note that patient has history of  reported previous neurosarcoidosis, although as mentioned previously this could  be unusual manifestation. 2. Additional nonenhancing low T1 signal medial inferior right thalamus,  suggestive most likely of sequela of either previous ischemic or inflammatory  process. .        Latest Hemoglobin A1C:  Lab Results   Component Value Date/Time    Hemoglobin A1c 5.0 03/02/2017 04:04 PM       Latest Cardiology Procedure:  No results found for this or any previous visit.     Important Labs:  Lab Results   Component Value Date/Time    Folate 16.1 03/02/2017 04:50 PM     Lab Results   Component Value Date/Time    Cholesterol, total 146 11/17/2016 03:33 PM    HDL Cholesterol 39 11/17/2016 03:33 PM    LDL, calculated 81.4 11/17/2016 03:33 PM    VLDL, calculated 25.6 11/17/2016 03:33 PM    Triglyceride 128 11/17/2016 03:33 PM    CHOL/HDL Ratio 3.7 11/17/2016 03:33 PM     Lab Results   Component Value Date/Time    TSH 0.33 06/16/2017 04:35 AM    T4, Free 0.8 03/02/2017 04:50 PM     No results found for: DS35, PHEN, PHENO, PHENT, DILF, DS39, PHENY, PTN, VALF2, VALAC, VALP, VALPR, DS6, CRBAM, CRBAMP, CARB2, XCRBAM    Discussed with: patient's father    Allergies: No Known Allergies      Vital Signs:   Visit Vitals    /61 (BP 1 Location: Right arm, BP Patient Position: At rest)    Pulse 93    Temp 97.7 °F (36.5 °C)    Resp 14    Ht 6' 2\" (1.88 m)    Wt 76.7 kg (169 lb 1.5 oz)    SpO2 100%    BMI 21.71 kg/m2        Neurological examination:    Appearance: In no acute distress, well developed, well nourished, cooperative   Cardiovascular: Heart is iram rate and rhythm, peripheral edema to bilateral knees.  Mental status examination: Drowsy but oriented X 3. Normal speech and language. Some slurring of words. Decreased attention.  Cranial Nerves:         II: visual fields Full to confrontation   II: pupils Equal, round, reactive to light Disconjugate gaze at rest.     III,VII: ptosis none   III,IV,VI: extraocular muscles  Full ROM   V: facial light touch sensation  normal   V,VII: corneal reflex     VII: facial muscle function  normal   VIII: hearing    IX: soft palate elevation  normal   X phonation soft   XI: sternocleidomastoid strength 5/5   XII: tongue strength  normal      Motor exam: Station, gait:  deferred. Moves all 4 extremities well, symmetrically. Left side spastic with left hand contracture. Unable to extend fingers left hand. 5/5 to left bicep and tricep. 5/5 on right side except anterior tibial 3+/5.  Sensory: Intact to light touch      Reflexes:  2+ right arm and 1+ left arm. Left knee reflex 2+. Ankle reflexes 1+ symmetric. Left  toe up going. Decreased pinprick right arm and leg.                                          PMH:   Past Medical History:   Diagnosis Date    Arm pain     [facility]) 2012    Meningitis     Meningitis     Neurosarcoidosis ([facility] [address] 2012      FH: History reviewed. No pertinent family history.    SH:   Social History     Social History    Marital status: SINGLE     Spouse name: N/A    Number of children: N/A    Years of education: N/A     Social History Main Topics    Smoking status: Former Smoker     Packs/day: 0.25     Quit date: 4/26/2017    Smokeless tobacco: Never Used    Alcohol use No    Drug use: No    Sexual activity: Not Currently     Other Topics Concern    None     Social History Narrative          Medications:    [x] REVIEWED  Current Facility-Administered Medications   Medication    sodium chloride (NS) flush 5-10 mL    sodium chloride (NS) flush 5-10 mL    acetaminophen (TYLENOL) tablet 650 mg    oxyCODONE-acetaminophen (PERCOCET) 5-325 mg per tablet 1 Tab    ondansetron (ZOFRAN) injection 4 mg    niCARdipine (CARDENE) 25 mg in 0.9% sodium chloride 250 mL infusion    dexamethasone (DECADRON) 4 mg/mL injection 6 mg    acetaminophen-codeine (TYLENOL #3) per tablet 1 Tab    azaTHIOprine (IMURAN) tablet 50 mg    buPROPion XL (WELLBUTRIN XL) tablet 300 mg    nystatin (MYCOSTATIN) 100,000 unit/mL oral suspension 500,000 Units    polyethylene glycol (MIRALAX) packet 17 g    0.9% sodium chloride infusion    ondansetron (ZOFRAN) injection 4 mg     Data:      [x] REVIEWED  Recent Results (from the past 24 hour(s))   PROTHROMBIN TIME + INR    Collection Time: 06/21/17  3:55 AM   Result Value Ref Range    Prothrombin time 15.2 11.5 - 15.2 sec    INR 1.2 0.8 - 1.2     METABOLIC PANEL, BASIC    Collection Time: 06/21/17  8:05 AM   Result Value Ref Range    Sodium 138 136 - 145 mmol/L    Potassium 4.2 3.5 - 5.5 mmol/L    Chloride 106 100 - 108 mmol/L    CO2 27 21 - 32 mmol/L    Anion gap 5 3.0 - 18 mmol/L    Glucose 87 74 - 99 mg/dL    BUN 17 7.0 - 18 MG/DL    Creatinine 0.64 0.6 - 1.3 MG/DL    BUN/Creatinine ratio 27 (H) 12 - 20      GFR est AA >60 >60 ml/min/1.73m2    GFR est non-AA >60 >60 ml/min/1.73m2    Calcium 7.9 (L) 8.5 - 10.1 MG/DL   CBC WITH AUTOMATED DIFF    Collection Time: 06/21/17  8:05 AM   Result Value Ref Range    WBC 11.5 4.6 - 13.2 K/uL    RBC 4.07 (L) 4.70 - 5.50 M/uL    HGB 11.1 (L) 13.0 - 16.0 g/dL    HCT 34.6 (L) 36.0 - 48.0 %    MCV 85.0 74.0 - 97.0 FL    MCH 27.3 24.0 - 34.0 PG    MCHC 32.1 31.0 - 37.0 g/dL    RDW 15.8 (H) 11.6 - 14.5 %    PLATELET 745 831 - 820 K/uL    MPV 10.1 9.2 - 11.8 FL    NEUTROPHILS 85 (H) 40 - 73 %    LYMPHOCYTES 8 (L) 21 - 52 %    MONOCYTES 7 3 - 10 %    EOSINOPHILS 0 0 - 5 %    BASOPHILS 0 0 - 2 %    ABS. NEUTROPHILS 9.8 (H) 1.8 - 8.0 K/UL    ABS. LYMPHOCYTES 0.9 0.9 - 3.6 K/UL    ABS. MONOCYTES 0.8 0.05 - 1.2 K/UL    ABS. EOSINOPHILS 0.0 0.0 - 0.4 K/UL    ABS.  BASOPHILS 0.0 0.0 - 0.06 K/UL    DF AUTOMATED         Ricarda Bcaa MD

## 2017-06-21 NOTE — PROGRESS NOTES
2000 Assumed care of the pt, assessments completed and charted. Resting quietly in the bed, VSS, no distress noted or reported. 0700 No issues to report throughout the evening. Bedside and Verbal shift change report given to Desiree Foy RN (oncoming nurse) by Cristian Campos RN   (offgoing nurse).  Report included the following information SBAR, Kardex, Intake/Output, MAR, Recent Results and Cardiac Rhythm SB.

## 2017-06-21 NOTE — PROGRESS NOTES
Neurology Progress Note    Admit Date: 6/15/2017  Length of Stay: 6  Primary Care: Nishant Sy MD     Interim History: SLP today. Denies HA    Assessment:    Principle Problem: Brain mass     Problem List: Principal Problem:    Brain mass (6/18/2017)    Active Problems:    Neurosarcoidosis ([address] (10/17/2016)      Abnormal MRI scan, head (6/15/2017)      Diplopia (6/15/2017)        Plan:    1. Abnormal MRI brain with mass thalamus and mid-brain increasing. Biopsy yesterday- POD 2. From notes plan if sarcoid will increase Imuran and change decadron to prednisone. Pathology back. All six samples: mixed inflammatory infiltrate and reactive gliosis as final.  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 Sarcoid type granulomas not seen. . Sample is being sent to [facility] for [facility] evaluation. Continuing Imuran and decadron. Did miss this AM and yesterday's dose of Imuran due to NPO status. Now dose of Imuran ordered. Discussed with nursing. Have not discussed this with patient. Will confer with Dr. Hesham Smith about next steps.       Dobhoff was unable to be placed after multiple attempts by nursing. Did pass MBS for puree and this liquids with spoon. Poor coordination so will need help with PO. Results reviewed:     CT Results (most recent):    Results from Hospital Encounter encounter on 06/15/17   CT HEAD WO CONT   Narrative EXAM: CT head    INDICATION: Post brain biopsy neurologic change. COMPARISON: 6/15/2017. TECHNIQUE: Axial CT imaging of the head was performed without intravenous  contrast. Coronal and sagittal reconstructions were obtained. Dose reduction: One or more dose reduction techniques were used on this CT:  automated exposure control, adjustment of the mAs and/or kVp according to  patient's size, and iterative reconstruction techniques.  The specific techniques  utilized on this CT exam have been documented in the patient's electronic  medical record.  _______________    FINDINGS:    BRAIN PARENCHYMA: Left frontal craniotomy site noted extending to the thalamic  region with air along its tract. Tiny 3 mm hemorrhagic focus is seen in the left  thalamus at the biopsy site. Rest of the brain appears unremarkable. There is  1-2 mm left-to-right midline shift. VENTRICLES/EXTRA-AXIAL SPACES/MENINGES: The ventricles and sulci are normal in  their size and configuration. OSSEOUS STRUCTURES: No fracture is seen. PARANASAL SINUSES/MASTOIDS: Visualized paranasal sinuses and mastoid air cells  are clear. ORBITS: The visualized orbits are unremarkable. OTHER: None.      _______________         Impression IMPRESSION:    Left frontal craniotomy site due to left thalamic biopsy with a tiny hemorrhagic  focus in the left thalamus. 1-2 mm left-to-right midline shift. Otherwise  unchanged. The study was initially interpreted by the radiology resident at the time of the  examination and the appropriate personnel informed. MRI Results (most recent):    Results from [place] encounter on 06/15/17   MRI BRAIN W WO CONT   Narrative History: Follow-up left thalamic and midbrain lesion, preop for Stealth protocol    Comparison 6/15/2017, 3/7/2017, and 3/2/2017    PROCEDURE: Axial 3-D SPGR T1 sequences obtained before and after gadolinium  administration using Stealth protocol. FINDINGS: Since previous studies there has been interval increased size of area  of T1 hypointensity now involving nearly the entire left thalamus and extending  to the adjacent posterior limb left internal capsule. There is continued  extension of T1 hypointensity into the left midbrain and involving the left side  dorsal midbrain contiguous with aqueduct. There is some continued mild expansile  changes of left midbrain but degree of extension of signal changes and expansion  of left midbrain appears slightly less pronounced than prior studies. Several  small foci of enhancement are again noted along the inferior central aspect of  the left thalamic lesion. There is also persistent nonenhancing T1 hypointensity  within the inferior and medial right thalamus. There is persistent mild mass  effect and effacement on aqueduct and upper fourth ventricle and inferior third  ventricle with no hydrocephalus. No other abnormal signal within the brain or abnormal enhancement. Impression IMPRESSION:    1. Left thalamic lesion involving adjacent left midbrain with small inferior  central areas of associated enhancement with surrounding mass effect. As  mentioned on most recent study there is a mixed pattern of progression of the  thalamic portion of this lesion and some improvement of the midbrain portion of  this lesion, with differential diagnosis remaining the same including glioma,  lymphoma, or tumefactive demyelination. Note that patient has history of  reported previous neurosarcoidosis, although as mentioned previously this could  be unusual manifestation. 2. Additional nonenhancing low T1 signal medial inferior right thalamus,  suggestive most likely of sequela of either previous ischemic or inflammatory  process. .        Latest Hemoglobin A1C:  Lab Results   Component Value Date/Time    Hemoglobin A1c 5.0 03/02/2017 04:04 PM       Latest Cardiology Procedure:  No results found for this or any previous visit.     Important Labs:  Lab Results   Component Value Date/Time    Folate 16.1 03/02/2017 04:50 PM     Lab Results   Component Value Date/Time    Cholesterol, total 146 11/17/2016 03:33 PM    HDL Cholesterol 39 11/17/2016 03:33 PM    LDL, calculated 81.4 11/17/2016 03:33 PM    VLDL, calculated 25.6 11/17/2016 03:33 PM    Triglyceride 128 11/17/2016 03:33 PM    CHOL/HDL Ratio 3.7 11/17/2016 03:33 PM     Lab Results   Component Value Date/Time    TSH 0.33 06/16/2017 04:35 AM    T4, Free 0.8 03/02/2017 04:50 PM     No results found for: DS35, PHEN, PHENO, PHENT, DILF, DS39, PHENY, PTN, VALF2, VALAC, VALP, VALPR, DS6, CRBAM, CRBAMP, CARB2, XCRBAM    Discussed with: Allergies: No Known Allergies      Vital Signs:   Visit Vitals    BP 98/59 (BP 1 Location: Right arm, BP Patient Position: At rest)    Pulse (!) 58    Temp 97.4 °F (36.3 °C)    Resp 15    Ht 6' 2\" (1.88 m)    Wt 76.7 kg (169 lb 1.5 oz)    SpO2 100%    BMI 21.71 kg/m2        Neurological examination:    Appearance: In no acute distress, well developed, well nourished, cooperative   Cardiovascular: Heart is regular rate and rhythm, peripheral edema to bilateral knees.  Mental status examination: Alert and oriented X 3. Speech with wet vocal quality but able to clear. Normal attention, memory and fund of knowledge.  Cranial Nerves:         II: visual fields Full to confrontation   II: pupils Equal, round, reactive to light. Disconjugate gaze at rest.   III,VII: ptosis none   III,IV,VI: extraocular muscles  Full ROM   V: facial light touch sensation  normal   V,VII: corneal reflex     VII: facial muscle function  normal   VIII: hearing    IX: soft palate elevation  normal   X phonation soft   XI: sternocleidomastoid strength 5/5   XII: tongue strength  normal      Motor exam: Station, gait:  deferred. Moves all 4 extremities well, symmetrically. Left side spastic with left hand contracture. Unable to extend fingers left hand. 5/5 to left bicep and tricep. 5/5 on right side except anterior tibial 3+/5.  Sensory: Intact to light touch   Coordination: Decreased fine motor and slow alternating movements, finger to nose testing ataxic. Harles Old PMH:   Past Medical History:   Diagnosis Date    Arm pain     CVA (cerebrovascular accident) ([address] 2012    Meningitis     Meningitis     Neurosarcoidosis ([facility] [address] 2012      FH: History reviewed. No pertinent family history.    SH:   Social History     Social History    Marital status: SINGLE     Spouse name: N/A    Number of children: N/A    Years of education: N/A     Social History Main Topics    Smoking status: Former Smoker     Packs/day: 0.25     Quit date: 4/26/2017    Smokeless tobacco: Never Used    Alcohol use No    Drug use: No    Sexual activity: Not Currently     Other Topics Concern    None     Social History Narrative          Medications:    [x] REVIEWED  Current Facility-Administered Medications   Medication    azaTHIOprine (IMURAN) tablet 50 mg    sodium chloride (NS) flush 5-10 mL    sodium chloride (NS) flush 5-10 mL    acetaminophen (TYLENOL) tablet 650 mg    oxyCODONE-acetaminophen (PERCOCET) 5-325 mg per tablet 1 Tab    ondansetron (ZOFRAN) injection 4 mg    niCARdipine (CARDENE) 25 mg in 0.9% sodium chloride 250 mL infusion    dexamethasone (DECADRON) 4 mg/mL injection 6 mg    acetaminophen-codeine (TYLENOL #3) per tablet 1 Tab    azaTHIOprine (IMURAN) tablet 50 mg    buPROPion XL (WELLBUTRIN XL) tablet 300 mg    nystatin (MYCOSTATIN) 100,000 unit/mL oral suspension 500,000 Units    polyethylene glycol (MIRALAX) packet 17 g    0.9% sodium chloride infusion    ondansetron (ZOFRAN) injection 4 mg     Data:      [x] REVIEWED  Recent Results (from the past 24 hour(s))   GLUCOSE, POC    Collection Time: 06/20/17  5:14 PM   Result Value Ref Range    Glucose (POC) 97 70 - 110 mg/dL   PROTHROMBIN TIME + INR    Collection Time: 06/21/17  3:55 AM   Result Value Ref Range    Prothrombin time 15.2 11.5 - 15.2 sec    INR 1.2 0.8 - 1.2     METABOLIC PANEL, BASIC    Collection Time: 06/21/17  8:05 AM   Result Value Ref Range    Sodium 138 136 - 145 mmol/L    Potassium 4.2 3.5 - 5.5 mmol/L    Chloride 106 100 - 108 mmol/L    CO2 27 21 - 32 mmol/L    Anion gap 5 3.0 - 18 mmol/L    Glucose 87 74 - 99 mg/dL    BUN 17 7.0 - 18 MG/DL    Creatinine 0.64 0.6 - 1.3 MG/DL    BUN/Creatinine ratio 27 (H) 12 - 20      GFR est AA >60 >60 ml/min/1.73m2    GFR est non-AA >60 >60 ml/min/1.73m2 Calcium 7.9 (L) 8.5 - 10.1 MG/DL   CBC WITH AUTOMATED DIFF    Collection Time: 06/21/17  8:05 AM   Result Value Ref Range    WBC 11.5 4.6 - 13.2 K/uL    RBC 4.07 (L) 4.70 - 5.50 M/uL    HGB 11.1 (L) 13.0 - 16.0 g/dL    HCT 34.6 (L) 36.0 - 48.0 %    MCV 85.0 74.0 - 97.0 FL    MCH 27.3 24.0 - 34.0 PG    MCHC 32.1 31.0 - 37.0 g/dL    RDW 15.8 (H) 11.6 - 14.5 %    PLATELET 008 027 - 095 K/uL    MPV 10.1 9.2 - 11.8 FL    NEUTROPHILS 85 (H) 40 - 73 %    LYMPHOCYTES 8 (L) 21 - 52 %    MONOCYTES 7 3 - 10 %    EOSINOPHILS 0 0 - 5 %    BASOPHILS 0 0 - 2 %    ABS. NEUTROPHILS 9.8 (H) 1.8 - 8.0 K/UL    ABS. LYMPHOCYTES 0.9 0.9 - 3.6 K/UL    ABS. MONOCYTES 0.8 0.05 - 1.2 K/UL    ABS. EOSINOPHILS 0.0 0.0 - 0.4 K/UL    ABS.  BASOPHILS 0.0 0.0 - 0.06 K/UL    DF AUTOMATED         Makeda Cherry NP

## 2017-06-21 NOTE — PROGRESS NOTES
Problem: Dysphagia (Adult)  Goal: *Acute Goals and Plan of Care (Insert Text)  Recommendations:  Diet: puree, thin liquids VIA TSP with assistance and strict SAFE SWALLOWING GUIDELINES  Meds: crushed in puree  Aspiration Precautions  Oral Care TID    Goals: Patient will:  1. Tolerate PO trials with 0 s/s overt distress in 4/5 trials  2. Utilize compensatory swallow strategies/maneuvers (decrease bite/sip, size/rate, alt. liq/sol) with min cues in 4/5 trials  3. Perform oral-motor/laryngeal exercises to increase oropharyngeal swallow function with min cues  4. Complete an objective swallow study (i.e., MBSS) to assess swallow integrity, r/o aspiration, and determine of safest LRD, min A- completed 6/21     Outcome: Progressing Towards Goal  SPEECH PATHOLOGY MODIFIED BARIUM SWALLOW STUDY     Patient: Cecilia López (40 y.o. male)  Date: 6/21/2017  Primary Diagnosis: Neurosarcoidosis (HCC)  left tere;amus and peduncle lesion  i63.8  Procedure(s) (LRB):  image guided left thalamus biopsy (N/A) 2 Days Post-Op   Precautions: aspiration  Fall      ASSESSMENT :  MBS completed with moderate penetration of thin liquids via cup-sip, resolved with tsp presentation. Patient with poor oral control across all trials to include thin, pudding, and solid trials. Delayed, prolonged mastication with incoordinated a-p propulsion resulting in premature spillage and swallow delay, however excellent airway protection noted. Vallecular and pyriform residue noted with solid trial. Mild oral residue noted with pudding trial however SLP suctioned for clearance. Overall deficits include poor oral phase, decreased sensation, and pharyngeal weakness. Pt presents with moderate-severe oral mild pharyngeal dysphagia, as evidenced above, which places pt at risk for aspiration. At this time, safest for puree solid, thin liquid diet via tsp only.  SLP utilized video of study for visual feedback, education, and recommendations for pt/caregiver; verbalized comprehension. Discussed with RNJoshua     Patient will benefit from skilled intervention to address the above impairments. Patients rehabilitation potential is considered to be Fair  Factors which may influence rehabilitation potential include:   [ ]              None noted  [X]              Mental ability/status  [X]              Medical condition  [ ]              Home/family situation and support systems  [ ]              Safety awareness  [ ]              Pain tolerance/management  [ ]              Other:        PLAN :  Recommendations and Planned Interventions:  Puree thin liquids via tsp  Frequency/Duration: Patient will be followed by speech-language pathology 1-2 times per day/4-7 days per week to address goals. Discharge Recommendations: Rehab and Skilled Nursing Facility       SUBJECTIVE:   Patient stated [place]. OBJECTIVE:       Past Medical History:   Diagnosis Date    Arm pain      CVA (cerebrovascular accident) ([facility] 2012    Meningitis      Meningitis      Neurosarcoidosis ([facility] [address] 2012     Past Surgical History:   Procedure Laterality Date    HX HIP REPLACEMENT   2010    HX WISDOM TEETH EXTRACTION         Prior Level of Function/Home Situation: independent  Home Situation  Home Environment: Private residence  One/Two Story Residence: Two story  Living Alone: No  Support Systems: Family member(s)  Patient Expects to be Discharged to[de-identified] Private residence  Current DME Used/Available at Home: Caitlyn Rater, straight, Walker, rolling  Diet prior to admission: regular  Current Diet:  Puree, thin via tsp   Radiologist: HRRA  Film Views: Lateral  Patient Position: HOB90      Trial 1: Trial 2:   Consistency Presented:  Thin liquid Consistency Presented: Pudding   How Presented: Cup/sip;Spoon;SLP-fed/presented How Presented: Spoon   Consistency Amount: 4 Consistency Amount: 1   Bolus Acceptance: Impaired Bolus Acceptance: No impairment   Bolus Formation/Control: Impaired: Delayed;Premature spillage Bolus Formation/Control: Impaired: Delayed;Premature spillage;Piecemeal   Propulsion: Delayed (# of seconds) Propulsion: Delayed (# of seconds)   Oral Residue: None Oral Residue: Lingual   Initiation of Swallow: Triggered at pyriform sinus(es) Initiation of Swallow: Triggered at valleculae   Timing: Pooling 1-5 sec Timing: Pooling 1-5 sec   Penetration: To laryngeal vestibule Penetration: None   Aspiration/Timing: No evidence of aspiration Aspiration/Timing: No evidence of aspiration   Pharyngeal Clearance: Vallecular residue; Less than 10% Pharyngeal Clearance: No residue   Attempted Modifications: Spoon     Effective Modifications: Spoon     Cues for Modifications: Maximal               Trial 3: Trial 4:   Consistency Presented: Mechanical soft     How Presented: Spoon;SLP-fed/presented     Consistency Amount: 1     Bolus Acceptance: Impaired     Bolus Formation/Control: Impaired: Delayed;Premature spillage  :     Propulsion: Delayed (# of seconds); Discoordination     Oral Residue: None     Initiation of Swallow: Triggered at base of tongue     Timing: Pooling 1-5 sec     Penetration: None     Aspiration/Timing: No evidence of aspiration     Pharyngeal Clearance: Vallecular residue;Pyriform residue                                 Decreased Tongue Base Retraction?: Yes  Laryngeal Elevation: Reduced excursion with laryngeal vestibule gap  Aspiration/Penetration Score: 2 (Penetration/No residue-Contrast enters the airway penetrates, remains above the folds/cords, and is cleared)  Pharyngeal Symmetry: Not assessed  Pharyngeal-Esophageal Segment: No impairment  Pharyngeal Dysfunction: Decreased tongue base retraction;Decreased strength     Oral Phase Severity: Moderate  Pharyngeal Phase Severity: Mild     GCODESwallowing:  Swallow Current Status CK= 40-59%   Swallow Goal Status CI= 1-19%     The severity rating is based on the following outcomes:  SONNY Noms Swallow               8-point Penetration-Aspiration Scale: Score 2  Clinical Judgement     PAIN:  Start of Study: 0  End of Study: 0       COMMUNICATION/EDUCATION:   [X]  Aspiration risks/precautions; compensatory swallow techniques  [ ]  Patient/family have participated as able in goal setting and plan of care. [ ]  Patient/family agree to work toward stated goals and plan of care. [X]  Patient understands intent and goals of therapy, but is neutral about his/her participation. [ ]  Patient is unable to participate in goal setting and plan of care.      Thank you for this referral.  Daniel Szymanski  MS [facility] SLP  Time Calculation: 50 mins

## 2017-06-21 NOTE — PROGRESS NOTES
Progress Note      Patient: Carissa Young.                Sex: male          DOA: 6/15/2017       YOB: 1980      Age:  39 y.o.        LOS:  LOS: 6 days               Subjective:   No complaints today, NAD, alert awake    Objective:      Visit Vitals    /61    Pulse (!) 56    Temp 97.9 °F (36.6 °C)    Resp 13    Ht 6' 2\" (1.88 m)    Wt 76.7 kg (169 lb 1.5 oz)    SpO2 99%    BMI 21.71 kg/m2       Physical Exam:  Pt NAD  Heart reg rate ad rhythm   Lungs good breath sounds heard   Abdomen soft and nontender   Neuro dysarthria present left hand flexure contracture from his previous cva         Lab/Data Reviewed:  CMP:   Lab Results   Component Value Date/Time     06/21/2017 08:05 AM    K 4.2 06/21/2017 08:05 AM     06/21/2017 08:05 AM    CO2 27 06/21/2017 08:05 AM    AGAP 5 06/21/2017 08:05 AM    GLU 87 06/21/2017 08:05 AM    BUN 17 06/21/2017 08:05 AM    CREA 0.64 06/21/2017 08:05 AM    GFRAA >60 06/21/2017 08:05 AM    GFRNA >60 06/21/2017 08:05 AM    CA 7.9 (L) 06/21/2017 08:05 AM     CBC:   Lab Results   Component Value Date/Time    WBC 11.5 06/21/2017 08:05 AM    HGB 11.1 (L) 06/21/2017 08:05 AM    HCT 34.6 (L) 06/21/2017 08:05 AM     06/21/2017 08:05 AM           Assessment/Plan     Active Problems:    Neurosarcoidosis ([facility] [address] (10/17/2016) vs glioma or other low grade tumor       Abnormal MRI scan, head (6/15/2017)      Diplopia (6/15/2017)        Plan:     Brain mass vs neurosarcoid-  S/p brain biopsy, per neurosurgery continue ICU monitoring  Hx CVA with left hemiparesis  Hx of aseptic necrosis R hip  Speech eval today

## 2017-06-21 NOTE — ROUTINE PROCESS
Bedside / verbal shift change report given to Hermelinda MEEHAN  (oncoming nurse) by Loenor Jimenez RN (offgoing nurse). Report included the following information SBAR, Kardex, Intake/Output, MAR and Recent Results.

## 2017-06-21 NOTE — PROGRESS NOTES
Assumed care of patient, patient in bed resting. Sbar report received from [address] [place] [address] contacted me and stated that he passed his barium swallow and is on pureed diet, with teaspoon sips. 1636 Sputum received in a glass swab container, lab john rejected sputum. Contacted On call for Dr. Attila Hutchison to inform him about the situation. No return call. Lab John slip in the patient chart. 1815 Patient mother sign consent for patient to have PICC line.

## 2017-06-22 NOTE — PROGRESS NOTES
Spoke with ms santos, [facility] has accepted pt, asked  Them  To  Start  auth tonight  For [employer].

## 2017-06-22 NOTE — PROGRESS NOTES
Problem: Mobility Impaired (Adult and Pediatric)  Goal: *Acute Goals and Plan of Care (Insert Text)  Physical Therapy Goals  Initiated 6/20/2017 and to be accomplished within 7 day(s)  1. Patient will move from supine to sit and sit to supine in bed with modified independence. 2. Patient will transfer from bed to chair and chair to bed with supervision using the least restrictive device. 3. Patient will perform sit to stand with supervision. 4. Patient will ambulate with supervision for 50 feet with the least restrictive device. 5. Patient will ascend/descend 4 stairs with handrail(s) with supervision/set-up. Outcome: Progressing Towards Goal  PHYSICAL THERAPY TREATMENT     Patient: Kenia Melendez (40 y.o. male)  Date: 6/22/2017  Diagnosis: Neurosarcoidosis (HCC)  left tere;amus and peduncle lesion  i63.8 Brain mass  Procedure(s) (LRB):  image guided left thalamus biopsy (N/A) 3 Days Post-Op  Precautions: Fall  Chart, physical therapy assessment, plan of care and goals were reviewed. ASSESSMENT:  Mother present throughout session. Min A for bed mobility. Seated balance at edge of bed poor; requires constant support. Decreased righting reflexes with trunk control for LOB in sitting. Completed upper body dressing to change gown with mod A. Mod A for sit to stand with ww. Cues for safety once standing as patient trying to amb with poor standing balance. Completed sit to stand x2. Returned to supine in bed with min A; all needs in reach; [employer] present. Educated on role of PT and plan of care. EDUCATION:  Bed mobility, transfers, balance, dressing, poc, role of PT  Progression toward goals:        Improving appropriately and progressing toward goals       PLAN:  Patient continues to benefit from skilled intervention to address the above impairments. Continue treatment per established plan of care.   Discharge Recommendations:  Rehab  Further Equipment Recommendations for Discharge:  rolling walker; patient has       SUBJECTIVE:   Agreeable to PT      OBJECTIVE DATA SUMMARY:   Critical Behavior:  Neurologic State: Alert  Orientation Level: Oriented to person, Oriented to place  Cognition: Follows commands, Impaired decision making, Poor safety awareness  Safety/Judgement: Fall prevention, Decreased insight into deficits     [employer] Balance Scale 1/5  0: Pt performs 25% or less of standing activity (Max assist) CN, 100% impaired. 1: Pt supports self with upper extremities but requires therapist assistance. Pt performs 25-50% of effort (Mod assist) CM, 80% to <100% impaired. 1+: Pt supports self with upper extremities but requires therapist assistance. Pt performs >50% effort. (Min assist). CL, 60% to <80% impaired. 2: Pt supports self independently with both upper extremities (walker, crutches, parallel bars). CL, 60% to <80% impaired. 2+: Pt support self independently with 1 upper extremity (cane, crutch, 1 parallel bar). CK, 40% to <60% impaired. 3: Pt stands without upper extremity support for up to 30 seconds. CK, 40% to <60% impaired. 3+: Pt stands without upper extremity support for 30 seconds or greater. CJ, 20% to <40% impaired. 4: Pt independently moves and returns center of gravity 1-2 inches in one plane. CJ, 20% to <40% impaired. 4+: Pt independently moves and returns center of gravity 1-2 inches in multiple planes. CI, 1% to <20% impaired. 5: Pt independently moves and returns center of gravity in all planes greater than 2 inches. CH, 0% impaired. G CODE:Mobility [id number] Current  CM= 80-99%   Goal  CL= 60-79%. The severity rating is based on the [employer] Balance Scale 1/5  Functional Mobility Training:  Bed Mobility:  Supine to Sit: Minimum assistance  Sit to Supine: Minimum assistance  Transfers:  Sit to Stand:  Moderate assistance  Stand to Sit: Moderate assistance  Balance:  Sitting: Impaired  Sitting - Static: Poor (constant support)  Sitting - Dynamic: Poor (constant support)  Standing: Impaired  Standing - Static: Poor  Standing - Dynamic :  (not tested)  Ambulation/Gait Training:  Gait Description (WDL):  (not tested)  Neuro Re-Education:  Sitting EOB 5 min  Standing balance 30 seconds x2  Therapeutic Exercises:   Dressing at edge of bed  Transfers   Sit to stand x2  Pain:  Pain Scale 1: Numeric (0 - 10)  Pain Intensity 1: 0  Pain Location 1: Head  Activity Tolerance:   Fair     After treatment:   Patient left in no apparent distress in bed  Call bell left within reach  Nursing notified and present  Bed alarm activated      Etta Sorto PT   Time Calculation: 20 mins

## 2017-06-22 NOTE — PROGRESS NOTES
Problem: Dysphagia (Adult)  Goal: *Acute Goals and Plan of Care (Insert Text)  Recommendations:  Diet: puree, thin liquids VIA TSP with assistance and strict SAFE SWALLOWING GUIDELINES  Meds: crushed in puree  Aspiration Precautions  Oral Care TID    Goals: Patient will:  1. Tolerate PO trials with 0 s/s overt distress in 4/5 trials  2. Utilize compensatory swallow strategies/maneuvers (decrease bite/sip, size/rate, alt. liq/sol) with min cues in 4/5 trials  3. Perform oral-motor/laryngeal exercises to increase oropharyngeal swallow function with min cues  4. Complete an objective swallow study (i.e., MBSS) to assess swallow integrity, r/o aspiration, and determine of safest LRD, min A- completed 6/21     Outcome: Not Met  SPEECH LANGUAGE PATHOLOGY DYSPHAGIA TREATMENT     Patient: Ubaldo Cain (40 y.o. male)  Date: 6/22/2017  Diagnosis: Neurosarcoidosis (HCC)  left tere;amus and peduncle lesion  i63.8 Brain mass  Procedure(s) (LRB):  image guided left thalamus biopsy (N/A) 3 Days Post-Op  Precautions: aspiration,  Fall      ASSESSMENT:  Pt seen b/s for re-eval for dysphagia per request of NP 2/2 reports of increased coughing/ choking with lunch this date. Pt changed to NPO pending results of re-eval.  Pt with lunch tray on b/s table, straw in thin iced tea. Pt accepted puree via half tsp and thin via tsp/ single straw presentation. Pt demo decreased labial seal with delayed oral onset, decreased bolus formation/ control, slow A-P transit with mild oral residue, cleared with liquid wash, mild- moderately delayed swallow response with weak laryngeal elevation, delayed cough response following straw presentation only. No overt s/sx aspiration following puree via half tsp or thin via tsp. Notified nurse, Rhonda Shen, of results, and NP. ST will continue to follow.    Progression toward goals:  [ ]         Improving appropriately and progressing toward goals  [X]         Improving slowly and progressing toward goals  [ ]         Not making progress toward goals and plan of care will be adjusted       PLAN:  Recommendations and Planned Interventions:  Puree diet/ thin liquids via tsp presentations, strict aspiration precautions: up 90* for all po and 30 minutes following, decreased rate of intake, turn off tv (pt looks up at tv while swallowing), alternate solids/ liquids. Patient continues to benefit from skilled intervention to address the above impairments. Continue treatment per established plan of care. Discharge Recommendations:  Inpatient Rehab, [place] and To Be Determined       SUBJECTIVE:   Patient stated [facility]. OBJECTIVE:   Cognitive and Communication Status:  Neurologic State: Alert  Orientation Level: Oriented to person  Cognition: Follows commands  Perception: Appears intact  Perseveration: No perseveration noted  Safety/Judgement: Fall prevention  Dysphagia Treatment:  Oral Assessment:  Oral Assessment  Labial: Decreased rate, Decreased seal  Dentition: Natural, Intact  Oral Hygiene: wfl  Lingual: Decreased rate, Decreased strength, Incoordinated  Velum: No impairment  Mandible: No impairment  Gag Reflex: No impairment  P.O. Trials:              Patient Position: upright in bed              Vocal quality prior to P.O.: Low volume              Consistency Presented:  Thin liquid, Puree              How Presented: Self-fed/presented, Spoon, Straw              How Much: 10              Bolus Acceptance: No impairment              Bolus Formation/Control: Impaired              Type of Impairment: Delayed, Drooling, Lip closure, Mastication              Propulsion: Delayed (# of seconds), Lingual tremors              Oral Residue: 10-50% of bolus              Initiation of Swallow: Delayed (# of seconds)              Laryngeal Elevation: Weak              Aspiration Signs/Symptoms: Delayed cough/throat clear (following trial thin via straw)              Pharyngeal Phase Characteristics: Audible swallow, Multiple swallows              Effective Modifications: Alternate liquids/solids, Spoon, Small sips and bites              Cues for Modifications: Moderate                                Oral Phase Severity: Moderate              Pharyngeal Phase Severity : Moderate                    PAIN:  Start of Tx: 0  End of Tx: 0      After treatment:   [ ]              Patient left in no apparent distress sitting up in chair  [X]              Patient left in no apparent distress in bed  [X]              Call bell left within reach  [X]              Nursing notified  [ ]              Family present  [ ]              Caregiver present  [ ]              Bed alarm activated         COMMUNICATION/EDUCATION:   [X]        Aspiration precautions; swallow safety; compensatory techniques  [ ]        Patient unable to participate in education; education ongoing with staff  [X]        Posted safety precautions in patient's room.   [ ]         Oral-motor/laryngeal strengthening exercises        Justyna Edgar MS, CCC/SLP  Time Calculation: 22 mins

## 2017-06-22 NOTE — PROGRESS NOTES
0715: Bedside and Verbal shift change report given to Darrell Lobo RN (oncoming nurse) by Silvia Vallejo RN (offgoing nurse). Report included the following information SBAR, Kardex, Intake/Output, MAR and Recent Results.

## 2017-06-22 NOTE — PROGRESS NOTES
completed follow up visit with patient in room 2101 as he returned form a test and a Spiritual assessment of patient was done. Patient responded that he is hanging in there at this point.    Chaplains will continue to follow and will provide pastoral care on an as needed/requested basis    Chaplain Abraham Fuller   Board Certified [address] [place] [address]   [phone]

## 2017-06-22 NOTE — PROGRESS NOTES
Spoke with pts mother, she  Agrees to snf. Posted in epic and edc to [employer], first choice  [facility] [employer]. need PT  To see need to start an auth  For ins, per dr Thu Hinojosa pt ready tomorrow.

## 2017-06-22 NOTE — PROGRESS NOTES
3942: 1st PT attempt. Patient having PICC line placed. Will follow up as schedule permits.      Thank you,     Simona Doshi, PT, DPT

## 2017-06-22 NOTE — PROCEDURES
Attempted to place PICC in right upper arm. Was able to access both the basilic and brachial veins but unable to thread the PICC past the shoulder in either vein. Pressure Dressing applied and Noel Spears RN informed. Dr. Flores Re contacted and informed of the situation.

## 2017-06-22 NOTE — PROGRESS NOTES
1935: Bedside verbal shift report received from [place], [place]. Pt is awake in bed, no signs of distress, instructed to press call light if assistance is needed, call light within reach, family at bedside.

## 2017-06-22 NOTE — PROGRESS NOTES
Neurosurgery Progress Note;  Transferred to the floor. S: overall doing better, lumbar puncture has been performed, afb and fungal cultures are pending. Wound looks good, path reviewed  Alert follows x 4   A; no obvious diagnosis at this point.   PO;  Supportive treatment, steroid and immuran

## 2017-06-22 NOTE — PROGRESS NOTES
Assumed care of patient. Patient is AOx1, to self, non-verbal.  Patient is resting in bed, in stable condition. Patient denies having any pain or discomfort.

## 2017-06-22 NOTE — INTERDISCIPLINARY ROUNDS
IDR Summary      Patient: Shad Cavazos MRN: [id number]    Age: 39 y.o.  : 1980     Admit Diagnosis: Neurosarcoidosis ([address]  left tere;amus and peduncle lesion  i63.8      Problems pertinent to hospital stay:     Consults:P.T, O.T., Speech and Case Management     Testing due for patient today?  YES    Nutrition plan:Yes     Mobility needs: Yes      Lines/Tubes:   IV: YES   Needed: YES  Gandhi: NO  Needed:NO  Central Line: NO Needed: NO      VTE Prophylaxis: Chemical            Care Management:  Discharge plan: SNF    PCP: Jesus Plascencia MD    : NO  Financial concerns:No   Interventions:       LOS: 7 days     Expected days until discharge: TBD        Signed:     Margurette Lesch, FNP-BC  [address]  Hospitalist Division  Pager:  [phone]  Office:  [phone]

## 2017-06-22 NOTE — PROGRESS NOTES
7114: 1st attempt; PICC line being placed. 1045: 2nd attempt; PT working with pt.  [phone]: 3rd attempt; as of [phone], pt on bedrest for 4 hours following LP. Will follow up.     Deysi Rdz MS OTR/L  Office Ext: [phone]  Pager: [phone]

## 2017-06-22 NOTE — PROGRESS NOTES
Independently seen. LP results mostly pending, protein is normal, and no cell count suggestive of infection. OCB and IgG are pending. So again, we have another test that is not helpful so far. He does has hx of similar lesions before that responded to steroids, so would probably increase his steroids to 8mg q 6 hrs or give one pulse steroid of methylprednisolone 30mg/kg, and get his appt with rheumatology. Pending biopsy results from [facility], and rest of LP. Also imuran may be increased to 100mg instead of his current 50mg. Neurology Progress Note    Admit Date: 6/15/2017  Length of Stay: 7  Primary Care: Angeline Jeter MD     Interim History: No over nite issues. For LP     Assessment:    Principle Problem: Brain mass     Problem List: Principal Problem:    Brain mass (6/18/2017)    Active Problems:    Neurosarcoidosis ([address] (10/17/2016)      Abnormal MRI scan, head (6/15/2017)      Diplopia (6/15/2017)        Plan:    1. 1. Abnormal MRI brain with mass thalamus and mid-brain increasing. POD 3 from biopsy. Biopsy of thalamus and peduncle lesion shows perivascular cuffing with lymphocytes and acute inflammatory changes. No granulomatous changes as seen in sarcoid and no viral cytophatic chagnes. Sample of the biopsy was sent to Dr. Haydee Hart at [facility] for further testing. For LP today to differentiate between inflammatory and infectious process. Consider SPECT as Outpatient. Results reviewed:     CT Results (most recent):    Results from Hospital Encounter encounter on 06/15/17   CT HEAD WO CONT   Narrative EXAM: CT head    INDICATION: Post brain biopsy neurologic change. COMPARISON: 6/15/2017. TECHNIQUE: Axial CT imaging of the head was performed without intravenous  contrast. Coronal and sagittal reconstructions were obtained.     Dose reduction: One or more dose reduction techniques were used on this CT:  automated exposure control, adjustment of the mAs and/or kVp according to  patient's size, and iterative reconstruction techniques. The specific techniques  utilized on this CT exam have been documented in the patient's electronic  medical record.  _______________    FINDINGS:    BRAIN PARENCHYMA: Left frontal craniotomy site noted extending to the thalamic  region with air along its tract. Tiny 3 mm hemorrhagic focus is seen in the left  thalamus at the biopsy site. Rest of the brain appears unremarkable. There is  1-2 mm left-to-right midline shift. VENTRICLES/EXTRA-AXIAL SPACES/MENINGES: The ventricles and sulci are normal in  their size and configuration. OSSEOUS STRUCTURES: No fracture is seen. PARANASAL SINUSES/MASTOIDS: Visualized paranasal sinuses and mastoid air cells  are clear. ORBITS: The visualized orbits are unremarkable. OTHER: None.      _______________         Impression IMPRESSION:    Left frontal craniotomy site due to left thalamic biopsy with a tiny hemorrhagic  focus in the left thalamus. 1-2 mm left-to-right midline shift. Otherwise  unchanged. The study was initially interpreted by the radiology resident at the time of the  examination and the appropriate personnel informed. MRI Results (most recent):    Results from [place] encounter on 06/15/17   MRI BRAIN W WO CONT   Narrative History: Follow-up left thalamic and midbrain lesion, preop for Stealth protocol    Comparison 6/15/2017, 3/7/2017, and 3/2/2017    PROCEDURE: Axial 3-D SPGR T1 sequences obtained before and after gadolinium  administration using Stealth protocol. FINDINGS: Since previous studies there has been interval increased size of area  of T1 hypointensity now involving nearly the entire left thalamus and extending  to the adjacent posterior limb left internal capsule. There is continued  extension of T1 hypointensity into the left midbrain and involving the left side  dorsal midbrain contiguous with aqueduct.  There is some continued mild expansile  changes of left midbrain but degree of extension of signal changes and expansion  of left midbrain appears slightly less pronounced than prior studies. Several  small foci of enhancement are again noted along the inferior central aspect of  the left thalamic lesion. There is also persistent nonenhancing T1 hypointensity  within the inferior and medial right thalamus. There is persistent mild mass  effect and effacement on aqueduct and upper fourth ventricle and inferior third  ventricle with no hydrocephalus. No other abnormal signal within the brain or abnormal enhancement. Impression IMPRESSION:    1. Left thalamic lesion involving adjacent left midbrain with small inferior  central areas of associated enhancement with surrounding mass effect. As  mentioned on most recent study there is a mixed pattern of progression of the  thalamic portion of this lesion and some improvement of the midbrain portion of  this lesion, with differential diagnosis remaining the same including glioma,  lymphoma, or tumefactive demyelination. Note that patient has history of  reported previous neurosarcoidosis, although as mentioned previously this could  be unusual manifestation. 2. Additional nonenhancing low T1 signal medial inferior right thalamus,  suggestive most likely of sequela of either previous ischemic or inflammatory  process. .        Latest Hemoglobin A1C:  Lab Results   Component Value Date/Time    Hemoglobin A1c 5.0 03/02/2017 04:04 PM       Latest Cardiology Procedure:  No results found for this or any previous visit.     Important Labs:  Lab Results   Component Value Date/Time    Folate 16.1 03/02/2017 04:50 PM     Lab Results   Component Value Date/Time    Cholesterol, total 146 11/17/2016 03:33 PM    HDL Cholesterol 39 11/17/2016 03:33 PM    LDL, calculated 81.4 11/17/2016 03:33 PM    VLDL, calculated 25.6 11/17/2016 03:33 PM    Triglyceride 128 11/17/2016 03:33 PM    CHOL/HDL Ratio 3.7 11/17/2016 03:33 PM     Lab Results Component Value Date/Time    TSH 0.33 06/16/2017 04:35 AM    T4, Free 0.8 03/02/2017 04:50 PM     No results found for: DS35, PHEN, PHENO, PHENT, DILF, DS39, PHENY, PTN, VALF2, VALAC, VALP, VALPR, DS6, CRBAM, CRBAMP, CARB2, XCRBAM    Discussed with: Allergies: No Known Allergies      Vital Signs:   Visit Vitals    /67 (BP 1 Location: Left arm, BP Patient Position: At rest)    Pulse 60    Temp 97.7 °F (36.5 °C)    Resp 16    Ht 6' 2\" (1.88 m)    Wt 76.7 kg (169 lb 1.5 oz)    SpO2 93%    BMI 21.71 kg/m2        Neurological examination:    Appearance: In no acute distress, well developed, well nourished, cooperative   Cardiovascular: Heart is regular rate and rhythm, peripheral edema is absent.  Mental status examination: Alert and oriented X 3. Speech with wet vocal quality but able to clear. Normal attention, memory and fund of knowledge.  Cranial Nerves:         II: visual fields Full to confrontation   II: pupils Equal, round, reactive to light  Disconjugate gaze at rest   III,VII: ptosis none   III,IV,VI: extraocular muscles  Full ROM   V: facial light touch sensation  normal   V,VII: corneal reflex     VII: facial muscle function  normal   VIII: hearing    IX: soft palate elevation  normal   X phonation soft   XI: sternocleidomastoid strength 5/5   XII: tongue strength  normal      Motor exam: Station, gait:  deferred   Moves all 4 extremities well, symmetrically. Left side spastic with left hand contracture.  Unable to extend fingers left hand.  5/5 to left bicep and tricep.  5/5 on right side except anterior tibial 3+/5.     Sensory: Intact to light touch   Coordination: Decreased fine motor and slow alternating movements, finger to nose testing ataxic.                                          PMH:   Past Medical History:   Diagnosis Date    Arm pain     [facility]A [facility]cerebrovascular [facility]) ([facility] 2012    Meningitis     Meningitis     Neurosarcoidosis ([facility] [address] 2012      FH: History reviewed. No pertinent family history.    SH:   Social History     Social History    Marital status: SINGLE     Spouse name: N/A    Number of children: N/A    Years of education: N/A     Social History Main Topics    Smoking status: Former Smoker     Packs/day: 0.25     Quit date: 4/26/2017    Smokeless tobacco: Never Used    Alcohol use No    Drug use: No    Sexual activity: Not Currently     Other Topics Concern    None     Social History Narrative          Medications:    [x] REVIEWED  Current Facility-Administered Medications   Medication    sodium chloride (NS) flush 5-10 mL    acetaminophen (TYLENOL) tablet 650 mg    oxyCODONE-acetaminophen (PERCOCET) 5-325 mg per tablet 1 Tab    ondansetron (ZOFRAN) injection 4 mg    dexamethasone (DECADRON) 4 mg/mL injection 6 mg    acetaminophen-codeine (TYLENOL #3) per tablet 1 Tab    azaTHIOprine (IMURAN) tablet 50 mg    buPROPion XL (WELLBUTRIN XL) tablet 300 mg    nystatin (MYCOSTATIN) 100,000 unit/mL oral suspension 500,000 Units    polyethylene glycol (MIRALAX) packet 17 g    0.9% sodium chloride infusion    ondansetron (ZOFRAN) injection 4 mg     Data:      [x] REVIEWED  Recent Results (from the past 24 hour(s))   PROTHROMBIN TIME + INR    Collection Time: 06/22/17  4:15 AM   Result Value Ref Range    Prothrombin time 15.0 11.5 - 15.2 sec    INR 1.2 0.8 - 1.2     METABOLIC PANEL, BASIC    Collection Time: 06/22/17 12:29 PM   Result Value Ref Range    Sodium 142 136 - 145 mmol/L    Potassium 4.3 3.5 - 5.5 mmol/L    Chloride 106 100 - 108 mmol/L    CO2 26 21 - 32 mmol/L    Anion gap 10 3.0 - 18 mmol/L    Glucose 87 74 - 99 mg/dL    BUN 16 7.0 - 18 MG/DL    Creatinine 0.67 0.6 - 1.3 MG/DL    BUN/Creatinine ratio 24 (H) 12 - 20      GFR est AA >60 >60 ml/min/1.73m2    GFR est non-AA >60 >60 ml/min/1.73m2    Calcium 8.1 (L) 8.5 - 10.1 MG/DL       Stone Milian NP

## 2017-06-22 NOTE — PROGRESS NOTES
Speech Pathology Note    Noted repeat order for eval.  Pt currently on caseload. Will continue to follow per [facility]. Per CNA, pt with increased coughing/ choking with lunch, changed to NPO by NP.   Will complete re-eval.    Areli Hermosillo MS, CCC/SLP  Speech- Language Pathologist

## 2017-06-22 NOTE — PROGRESS NOTES
Progress Note      Patient: Iraida Sarabia.                Sex: male          DOA: 6/15/2017       YOB: 1980      Age:  39 y.o.        LOS:  LOS: 7 days               Subjective:   No complaints today, NAD, alert awake    Objective:      Visit Vitals    /67 (BP 1 Location: Left arm, BP Patient Position: At rest)    Pulse 62    Temp 97.7 °F (36.5 °C)    Resp 14    Ht 6' 2\" (1.88 m)    Wt 76.7 kg (169 lb 1.5 oz)    SpO2 98%    BMI 21.71 kg/m2       Physical Exam:  Pt NAD  Heart reg rate ad rhythm   Lungs good breath sounds heard   Abdomen soft and nontender   Neuro dysarthria present left hand flexure contracture from his previous cva         Lab/Data Reviewed:  CMP:   No results found for: NA, K, CL, CO2, AGAP, GLU, BUN, CREA, GFRAA, GFRNA, CA, MG, PHOS, ALB, TBIL, TP, ALB, GLOB, AGRAT, SGOT, ALT, GPT  CBC:   No results found for: WBC, HGB, HGBEXT, HCT, HCTEXT, PLT, PLTEXT, HGBEXT, HCTEXT, PLTEXT        Assessment/Plan     Active Problems:    Neurosarcoidosis (HCC) (10/17/2016) vs glioma or other low grade tumor       Abnormal MRI scan, head (6/15/2017)      Diplopia (6/15/2017)        Plan:     Brain mass vs neurosarcoid-  S/p brain biopsy, per neurosurgery , now on floor, biospy unrevealing, LP today plan DC tomorrow  Hx CVA with left hemiparesis  Hx of aseptic necrosis R hip

## 2017-06-22 NOTE — PROCEDURES
Vascular & Interventional Radiology Brief Procedure Note    Interventional Radiologist: Rozina Lopez    Pre-operative Diagnosis:  Left thalamic lesion. Post-operative Diagnosis: Same as pre-op dx    Procedure(s) Performed:  Fluoroscopic guided lumbar puncture. Anesthesia:  Local    Findings:    1. Successful fluoroscopic guided lumbar puncture at the L3 level. 2.  Total 13 mL of clear CSF obtained and sent to lab. Complications: None    Estimated Blood Loss:  None    Tubes and Drains: None    Specimens: CSF    Condition: Stable    Disposition:  Back to floor. Plan: Per primary service.       Shobha Hollingsworth MD, MD  [facility]  Vascular & Interventional Radiology  6/22/2017

## 2017-06-23 NOTE — PROGRESS NOTES
Chart reviewed. Pt is discharged and transport will  pt at 1430 and take him to [facility]. His insurance authorized it. Roseann Martin RN will call family to let them know. OPAL vital and narcotic scripts faxed to Facility.

## 2017-06-23 NOTE — ROUTINE PROCESS
2000 Assumed care of patient. Pt alert and nonverbal. VSS. 2 PIV's CDI. NS @ 50 mL/hr maintenance fluids infusing. External condom cath in place. Pt in no distress on RA. No nonverbal indicators of pain noted at this time. Bed in low locked position and call bell within reach. Mom at bedside. 2100  Shift assessment completed. Pt bathed after incontinence episode. Bed pad changed, gown/linen changed. 2200 Hourly rounding. Evening meds given. Pt aspirated on Nystatin swish and spit even while maintaining aspiration precautions. Not safe to give Nystatin, will pass along to next RN.     0000- 0600 Hourly rounding. Pt reassessed, resting quietly with no c/o of pain at this time. 0700 Bedside shift change report given to French Siddiqi Dr. (oncoming nurse) by Ulysses Said., RN     (offgoing nurse). Report included the following information SBAR, Kardex, Intake/Output, MAR and Recent Results.

## 2017-06-23 NOTE — ROUTINE PROCESS
Bedside and Verbal shift change report given to [employer] (oncoming nurse) by Roderick Prado RN   (offgoing nurse). Report included the following information SBAR, Kardex, MAR and Recent Results.

## 2017-06-23 NOTE — PROGRESS NOTES
1000 pt will be discharged today, has not had a BM since 6/18, asked dr Josep Sexton for an order, he ordered pericolace and enema. Will try pill first, then give enema as pt wishes. 1300 pt had a BM on 6/22, per RN documentation, so enema was held. 1314 report called to Charles Perez at [facility], [facility].  time scheduled for 1430.    6838 called pt's father Xochilt Dewitt and informed him of pt's  time. 1440 transport here for pt.

## 2017-06-23 NOTE — TELEPHONE ENCOUNTER
Kathy called wanting to let Dr. Derrek Pozo know that pt was admitted to [address] [place] [address] on 6/15/2017 for sarcoidosis complications and today was transferred to [facility].

## 2017-06-23 NOTE — DISCHARGE SUMMARY
Discharge Summary    Patient: Nacho Powers. Sex: male          DOA: 6/15/2017         YOB: 1980      Age:  39 y.o.        LOS:  LOS: 8 days                Admit Date: 6/15/2017    Discharge Date: 6/23/2017    Discharge Medications:     Current Discharge Medication List      START taking these medications    Details   oxyCODONE-acetaminophen (PERCOCET) 5-325 mg per tablet Take 1 Tab by mouth every six (6) hours as needed. Max Daily Amount: 4 Tabs. Qty: 20 Tab, Refills: 0      dexamethasone (DECADRON) 4 mg tablet Take 2 tabs every 6 hrs  Qty: 60 Tab, Refills: 0         CONTINUE these medications which have CHANGED    Details   azaTHIOprine (IMURAN) 50 mg tablet Take 2 Tabs by mouth daily. Qty: 60 Tab, Refills: 0         CONTINUE these medications which have NOT CHANGED    Details   omeprazole (PRILOSEC) 10 mg capsule Take 10 mg by mouth daily. B.infantis-B.ani-B.long-B.bifi (PROBIOTIC 4X) 10-15 mg TbEC Take  by mouth. buPROPion XL (WELLBUTRIN XL) 300 mg XL tablet Take 1 Tab by mouth every morning. Qty: 60 Tab, Refills: 0      acetaminophen (TYLENOL) 500 mg tablet Take  by mouth every six (6) hours as needed for Pain. acetaminophen-codeine (TYLENOL-CODEINE #3) 300-30 mg per tablet Take 1 Tab by mouth every six (6) hours as needed for Pain. nystatin (MYCOSTATIN) 100,000 unit/mL suspension Take 5 mL by mouth four (4) times daily. swish and spit  Qty: 140 mL, Refills: 0    Associated Diagnoses: Thrush      polyethylene glycol (MIRALAX) 17 gram packet Take 1 Packet by mouth daily.   Qty: 30 Each, Refills: 1    Associated Diagnoses: Slow transit constipation             Follow-up: PCP, neurology,rheumatology     Discharge Condition: Stable    Activity: PT/OT Eval and Treat    Diet: pureed diet was thin liquids via teaspoon    Labs:  Labs: Results:       Chemistry Recent Labs      06/22/17   1229  06/21/17   0805   GLU  87  87   NA  142  138   K  4.3  4.2   CL  106 106   CO2  26  27   BUN  16  17   CREA  0.67  0.64   CA  8.1*  7.9*   AGAP  10  5   BUCR  24*  27*      CBC w/Diff Recent Labs      06/21/17   0805   WBC  11.5   RBC  4.07*   HGB  11.1*   HCT  34.6*   PLT  372   GRANS  85*   LYMPH  8*   EOS  0      Cardiac Enzymes No results for input(s): CPK, CKND1, BRAYDON in the last 72 hours. No lab exists for component: CKRMB, TROIP   Coagulation Recent Labs      06/23/17   0815  06/22/17   0415   PTP  13.7  15.0   INR  1.1  1.2       Lipid Panel Lab Results   Component Value Date/Time    Cholesterol, total 146 11/17/2016 03:33 PM    HDL Cholesterol 39 11/17/2016 03:33 PM    LDL, calculated 81.4 11/17/2016 03:33 PM    VLDL, calculated 25.6 11/17/2016 03:33 PM    Triglyceride 128 11/17/2016 03:33 PM    CHOL/HDL Ratio 3.7 11/17/2016 03:33 PM      BNP No results for input(s): BNPP in the last 72 hours. Liver Enzymes No results for input(s): TP, ALB, TBIL, AP, SGOT, GPT in the last 72 hours. No lab exists for component: DBIL   Thyroid Studies Lab Results   Component Value Date/Time    TSH 0.33 06/16/2017 04:35 AM          Imaging:   MRI brain  1. Left thalamic lesion involving adjacent left midbrain with small inferior  central areas of associated enhancement with surrounding mass effect. As  mentioned on most recent study there is a mixed pattern of progression of the  thalamic portion of this lesion and some improvement of the midbrain portion of  this lesion, with differential diagnosis remaining the same including glioma,  lymphoma, or tumefactive demyelination. Note that patient has history of  reported previous neurosarcoidosis, although as mentioned previously this could  be unusual manifestation.     2. Additional nonenhancing low T1 signal medial inferior right thalamus,  suggestive most likely of sequela of either previous ischemic or inflammatory  process. .    Consults: Neurology    Treatment Team: Treatment Team: Attending Provider: Aleksey San MD; Consulting Provider: Ramiro Hinds MD; Consulting Provider: Esmer Cifuentes MD; Consulting Provider: Reyes Stearns MD; Utilization Review: Ozzy House, RN; Utilization Review: Pedro London, SHEFALI; Occupational Therapist: Zayda Juan OT; Physical Therapist: Jared Gil PT    Significant Diagnostic Studies: labs:   Recent Results (from the past 24 hour(s))   PROTEIN, CSF    Collection Time: 06/22/17 12:03 PM   Result Value Ref Range    Tube No. 1      Protein,CSF 46 (H) 15 - 45 MG/DL   GLUCOSE, CSF    Collection Time: 06/22/17 12:03 PM   Result Value Ref Range    Tube No. 1      Glucose,CSF 53 40 - 70 MG/DL   CULTURE, CSF W GRAM STAIN    Collection Time: 06/22/17 12:03 PM   Result Value Ref Range    Special Requests: TUBE 2, CULTURE AND SENSITIVTY AND GRAM STAIN.      GRAM STAIN NO WBC'S SEEN      GRAM STAIN NO ORGANISMS SEEN      Culture result: PENDING    CELL COUNT, CSF    Collection Time: 06/22/17 12:04 PM   Result Value Ref Range    CSF TUBE NO. 3      CSF COLOR COLORLESS      CSF APPEARANCE CLEAR      CSF RBCS 2 (H) 0 /cu mm    CSF WBCS 0 <5 /cu mm   CULTURE, FUNGUS    Collection Time: 06/22/17 12:05 PM   Result Value Ref Range    Special Requests: NO SPECIAL REQUESTS      FUNGUS SMEAR NO FUNGAL ELEMENTS SEEN      Culture result: PENDING    COLETTE QL, W/REFLEX CASCADE    Collection Time: 06/22/17 12:29 PM   Result Value Ref Range    COLETTE Direct PENDING     See below Comment     METABOLIC PANEL, BASIC    Collection Time: 06/22/17 12:29 PM   Result Value Ref Range    Sodium 142 136 - 145 mmol/L    Potassium 4.3 3.5 - 5.5 mmol/L    Chloride 106 100 - 108 mmol/L    CO2 26 21 - 32 mmol/L    Anion gap 10 3.0 - 18 mmol/L    Glucose 87 74 - 99 mg/dL    BUN 16 7.0 - 18 MG/DL    Creatinine 0.67 0.6 - 1.3 MG/DL    BUN/Creatinine ratio 24 (H) 12 - 20      GFR est AA >60 >60 ml/min/1.73m2    GFR est non-AA >60 >60 ml/min/1.73m2    Calcium 8.1 (L) 8.5 - 10.1 MG/DL   MULTIPLE SCLEROSIS PANEL    Collection Time: 06/22/17 12:29 PM   Result Value Ref Range    IgG, Quant, CSF PENDING mg/dL    Albumin, CSF PENDING mg/dL    Albumin, serum 3.3 (L) 3.5 - 5.5 g/dL    Immunoglobulin G, Qt. 1020 700 - 1600 mg/dL    IgG/Alb ratio, CSF PENDING     CSF IgG Index PENDING     IgG Synthesis Rate, CSF PENDING mg/day    Oligoclonal Bands PENDING     CSF/Serum Alb. Index PENDING    PROTHROMBIN TIME + INR    Collection Time: 06/23/17  8:15 AM   Result Value Ref Range    Prothrombin time 13.7 11.5 - 15.2 sec    INR 1.1 0.8 - 1.2           Discharge diagnoses:    Problem List as of 6/23/2017  Date Reviewed: 6/19/2017          Codes Class Noted - Resolved    * (Principal)Brain mass ICD-10-CM: G93.9  ICD-9-CM: 348.9  6/18/2017 - Present        Abnormal MRI scan, head ICD-10-CM: R93.0  ICD-9-CM: 793.0  6/15/2017 - Present        Diplopia ICD-10-CM: H53.2  ICD-9-CM: 368.2  6/15/2017 - Present        Headache ICD-10-CM: R51  ICD-9-CM: 784.0  3/3/2017 - Present        Meningitis ICD-10-CM: G03.9  ICD-9-CM: 322.9  3/2/2017 - Present        Advance care planning ICD-10-CM: Z71.89  ICD-9-CM: V65.49  11/17/2016 - Present    Overview Signed 11/17/2016  3:20 PM by Bhavik Balbuena MD     Pt names his mother as his power of ; he is full code             Neurosarcoidosis [facility]) ICD-10-CM: D86.89  ICD-9-CM: 135  10/17/2016 - Present        Left hemiparesis ([facility] [address] ICD-10-CM: G81.94  ICD-9-CM: 342.90  10/17/2016 - Present            Hospital Course:   Major issues addressed during hospitalization outlined  below. Lauren Beck is a 39 y.o. male who was admitted to the hospital at [facility] because of a new onset of diplopia . The pt has a diagnosis of neurosarcoidosis which was diagnosed in  [facility] as per his mother . The data for the diagnosis is not available . It was made by performing a lumbar puncture. 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 He had a cva in 2014 .  And was left with a left hemiparesis he has a flexure contracture on the left hand he is on imuran presently . He hwas admitted by dr Nabeel Craven and was on decadron for a short period of time and he seemed to respond to the steroids . he has a recent onset of diplopia about two weeks ago . his right eye is covered by an eye patch . The pt hd a ct scan of the head  showing  Extension of the   Abnormality in the thalamus and the diagnostic considerations include  Neurosarcoidosis  And demyelnanting disease . The mri of the head showed an unusual expansile  And mildly  Enhancing lesion  In the left thalamus  and midbrain and overall has  Significantly increased in size  Since march 17 ,2017 the differential  diagnosis is astrocytoma . tumefactive demyelination and lymphoma the pt will now be admitted and the case was discussed extensively with the pt's family ,. The pt 's family said that he was diagnosed with bacterial meningitis [place] and then was given the diagnosis of neurosarcoidosis     Patient was essentially admitted for NS consult and brain biopsy which was completed on 6/20 without helpful findings to determine diagnosis on initial path, sample sent to [facility] however given low yield patient underwent LP on 6/22. Multiple tests pending . Neurology to f/u as OP as well as rheumatology for neuro sarcoid. PT followed as did speech and placed patient on modified diet and recommended SNF. PCP or in facility physician f/u in 1 week.      Hien Martinez MD  June 23, 2017        Total time spent 40 minutes

## 2017-06-23 NOTE — PROGRESS NOTES
0583: 1st PT attempt. Patient eating breakfast; SHEFALI Turner in room. Will re-attempt PT as schedule permits.

## 2017-06-23 NOTE — PROGRESS NOTES
4131: pt refusing OT evaluation despite max encouragement. Will follow up.     Rupa Wood MS OTR/L  Office Ext: [phone]  Pager: [phone]

## 2017-06-23 NOTE — PROGRESS NOTES
Problem: Dysphagia (Adult)  Goal: *Acute Goals and Plan of Care (Insert Text)  Recommendations:  Diet: puree, thin liquids VIA TSP with assistance and strict SAFE SWALLOWING GUIDELINES  Meds: crushed in puree  Aspiration Precautions  Oral Care TID    Goals: Patient will:  1. Tolerate PO trials with 0 s/s overt distress in 4/5 trials  2. Utilize compensatory swallow strategies/maneuvers (decrease bite/sip, size/rate, alt. liq/sol) with min cues in 4/5 trials  3. Perform oral-motor/laryngeal exercises to increase oropharyngeal swallow function with min cues  4. Complete an objective swallow study (i.e., MBSS) to assess swallow integrity, r/o aspiration, and determine of safest LRD, min A- completed 6/21     Outcome: Progressing Towards Goal  SPEECH LANGUAGE PATHOLOGY DYSPHAGIA TREATMENT     Patient: Sabas Alves. (40 y.o. male)  Date: 6/23/2017  Diagnosis: Neurosarcoidosis (HCC)  left tere;amus and peduncle lesion  i63.8 Brain mass  Procedure(s) (LRB):  image guided left thalamus biopsy (N/A) 4 Days Post-Op  Precautions: Aspiration, Fall      ASSESSMENT:  Pt seen for swallow tx. Pt alert and awake, accepting of tx. Pt with low volume, answering Y/N questions and repeating a preferred option with choices. Pt given PO trials of thin liquid via spoon, nectar thick liquid via spoon, puree, and mech soft chopped textures. Pt presented with trace anterior spillage, which he was aware of, immediate strong cough, watery eyes, and wet vocal quality after initial trial of a large tsp sip of thin liquid. On subsequent trials of small tsp sips via spoon, pt displayed no overt s/sx of aspiration +9/10. Pt presented with decreased bolus formation and mild anterior oral residue with puree texture +5/5 trials. However, pt was able to clear with tongue after initial swallows and independently performed repeat swallow. Pt with no overt s/sx of aspiration with nectar thick liquids.  However, indicated he did not like nectar thick and preferred thin liquid via small tsp sip. Pt with mild-mod swallow initiation delay across consistencies. Adequate laryngeal elevation noted via palpation. Pt educated on safe swallow strategies of small bites/sips, alternating solids/liquids, and avoiding acidic liquids. Recommend continue current diet of puree texture and thin liquid via tsp only. Progression toward goals:  [ ]         Improving appropriately and progressing toward goals  [X]         Improving slowly and progressing toward goals  [ ]         Not making progress toward goals and plan of care will be adjusted       PLAN:  Recommendations and Planned Interventions:  See above. Patient continues to benefit from skilled intervention to address the above impairments. Continue treatment per established plan of care. Discharge Recommendations:  Rehab       SUBJECTIVE:   Patient stated No (in response to SLP asking \"did you like the nectar thick? \"). OBJECTIVE:   Cognitive and Communication Status:  Neurologic State: Alert  Orientation Level: Oriented to person, Oriented to place  Cognition: Appropriate decision making, Appropriate for age attention/concentration, Follows commands  Perception: Appears intact  Perseveration: No perseveration noted  Safety/Judgement: Insight into deficits  Dysphagia Treatment:  Oral Assessment:  Oral Assessment  Labial: Decreased seal  Dentition: Intact  Oral Hygiene: WFL  Lingual: No impairment  Velum: No impairment  Mandible: No impairment  Gag Reflex: No impairment  P.O. Trials:              Patient Position: 55 at [facility]              Vocal quality prior to P.O.: Low volume              Consistency Presented:  Thin liquid, Nectar thick liquid, Puree, Mechanical soft              How Presented: SLP-fed/presented, Spoon              How Much: 15              Bolus Acceptance: No impairment              Bolus Formation/Control: Impaired              Type of Impairment: Delayed, Spillage              Propulsion: Delayed (# of seconds)              Oral Residue: Less than 10% of bolus              Initiation of Swallow: Delayed (# of seconds)              Laryngeal Elevation: Functional              Aspiration Signs/Symptoms: Strong cough, Watery eyes              Pharyngeal Phase Characteristics: Double swallow              Effective Modifications: Spoon, Small sips and bites, Alternate liquids/solids              Cues for Modifications: Moderate                                Oral Phase Severity: Mild              Pharyngeal Phase Severity : Mild-moderate     PAIN:  Start of Tx: 0  End of Tx: 0      After treatment:   [ ]              Patient left in no apparent distress sitting up in chair  [X]              Patient left in no apparent distress in bed  [X]              Call bell left within reach  [ ]              Nursing notified  [X]              Family present  [ ]              Caregiver present  [ ]              Bed alarm activated         COMMUNICATION/EDUCATION:   [X]        Aspiration precautions; swallow safety; compensatory techniques  [ ]        Patient unable to participate in education; education ongoing with staff  [ ]         Posted safety precautions in patient's room.   [ ]         Oral-motor/laryngeal strengthening exercises        Laurel Alejo MS, CFY-SLP observing  [id number] [place] [address] M.SMary, [facility]-SLP  Time Calculation: 16 mins

## 2017-06-23 NOTE — DISCHARGE INSTRUCTIONS
DISCHARGE SUMMARY from Nurse    The following personal items are in your possession at time of discharge:    Dental Appliances: None  Visual Aid: None     Home Medications: None  Jewelry: None  Clothing: None  Other Valuables: None (All items left at bedside)             PATIENT INSTRUCTIONS:    After general anesthesia or intravenous sedation, for 24 hours or while taking prescription Narcotics:  · Limit your activities  · Do not drive and operate hazardous machinery  · Do not make important personal or business decisions  · Do  not drink alcoholic beverages  · If you have not urinated within 8 hours after discharge, please contact your surgeon on call. Report the following to your surgeon:  · Excessive pain, swelling, redness or odor of or around the surgical area  · Temperature over 100.5  · Nausea and vomiting lasting longer than 4 hours or if unable to take medications  · Any signs of decreased circulation or nerve impairment to extremity: change in color, persistent  numbness, tingling, coldness or increase pain  · Any questions        What to do at Home:  Recommended activity: Activity as tolerated. If you experience any of the following symptoms pain, redness, warmth, please follow up with your primary care provider, facility physician, or local ED. *  Please give a list of your current medications to your Primary Care Provider. *  Please update this list whenever your medications are discontinued, doses are      changed, or new medications (including over-the-counter products) are added. *  Please carry medication information at all times in case of emergency situations. These are general instructions for a healthy lifestyle:    No smoking/ No tobacco products/ Avoid exposure to second hand smoke    Surgeon General's Warning:  Quitting smoking now greatly reduces serious risk to your health.     Obesity, smoking, and sedentary lifestyle greatly increases your risk for illness    A healthy diet, regular physical exercise & weight monitoring are important for maintaining a healthy lifestyle    You may be retaining fluid if you have a history of heart failure or if you experience any of the following symptoms:  Weight gain of 3 pounds or more overnight or 5 pounds in a week, increased swelling in our hands or feet or shortness of breath while lying flat in bed. Please call your doctor as soon as you notice any of these symptoms; do not wait until your next office visit. Recognize signs and symptoms of STROKE:    F-face looks uneven    A-arms unable to move or move unevenly    S-speech slurred or non-existent    T-time-call 911 as soon as signs and symptoms begin-DO NOT go       Back to bed or wait to see if you get better-TIME IS BRAIN. Warning Signs of HEART ATTACK     Call 911 if you have these symptoms:   Chest discomfort. Most heart attacks involve discomfort in the center of the chest that lasts more than a few minutes, or that goes away and comes back. It can feel like uncomfortable pressure, squeezing, fullness, or pain.  Discomfort in other areas of the upper body. Symptoms can include pain or discomfort in one or both arms, the back, neck, jaw, or stomach.  Shortness of breath with or without chest discomfort.  Other signs may include breaking out in a cold sweat, nausea, or lightheadedness. Don't wait more than five minutes to call 911 - MINUTES MATTER! Fast action can save your life. Calling 911 is almost always the fastest way to get lifesaving treatment. Emergency Medical Services staff can begin treatment when they arrive -- up to an hour sooner than if someone gets to the hospital by car. The discharge information has been reviewed with the patient. The patient verbalized understanding. Discharge medications reviewed with the patient and appropriate educational materials and side effects teaching were provided.     Patient armband removed and shredded.

## 2017-06-29 NOTE — PROGRESS NOTES
Community Care Team Documentation for Patient in [facility]     Patient discharged from [facility] 6/15/2017- 6/23/2017 to [facility], [address] [facility], on 6/23/2017. Hospital Discharge diagnosis: Neurosarcoidosis. SNF Attending Provider: Exie Fothergill  Anticipated LOS: 60 days  Anticipated discharge date from SNF:  8/25/2017  Anticipated disop: home with family support    PCP : Calos Perez MD    Nurse Navigator: [address] [place] [address]. Diet: pureed diet was thin liquids via teaspoon    CCT rounds completed, updates provided by facility. NO AMD. PT/OT/SP. Progressing slowly with therapy.

## 2017-07-06 NOTE — PROGRESS NOTES
Community Care Team Documentation for Patient in [facility]      Patient discharged from [facility] 6/15/2017-           6/23/2017 to [facility], [address], on 6/23/2017.    [facility] Discharge diagnosis: Neurosarcoidosis.     SNF Attending Provider: Alysa Maddox  Anticipated LOS: 60 days  Anticipated discharge date from SNF:  8/25/2017  Anticipated disop: home with family support     PCP : Oanh Leiva MD     Nurse Navigator: [address] [place] [address].      Diet: Mech soft, necter thick     CCT rounds completed, updates provided by facility.      NO AMD. PT/OT/SP. Progressing slowly with therapy.

## 2017-07-27 NOTE — PROGRESS NOTES
Community Care Team Documentation for Patient in [facility]      Patient discharged from [facility] 6/15/2017- 6/23/2017 to [facility], [address], on 6/23/2017.    [facility] Discharge diagnosis: Neurosarcoidosis.     SNF Attending Provider: An Mason  Anticipated LOS: 60 days  Anticipated discharge date from SNF:  8/25/2017  Anticipated disop: home with family support     PCP : Betty Hanna MD     Nurse Navigator: [address] [place] [address].      Diet: Mech soft, necter thick     CCT rounds completed, updates provided by facility.      NO AMD. PT/OT/SP. Progressing with therapy, trials with thin liquids, min assit with transfers. Mod assist with dressing. Amb with handrail 6 ft with standby assist. Completing all hygiene by himself.

## 2017-08-07 NOTE — PROGRESS NOTES
LM for patient to call our office to follow-up on referral to Rheumatology. Unable to reach the physicans office due to high call volumes.

## 2017-08-21 NOTE — PROGRESS NOTES
[address] ([phone]). Spoke to medical records, [facility]. Pt's identity verified x2.  Per [facility], pt currently still admitted      Will continue to follow

## 2017-08-22 NOTE — PROGRESS NOTES
Community Care Team Documentation for Patient in [facility]      Patient discharged from [facility] 6/15/2017- 6/23/2017 to [facility], [address], on 6/23/2017.    [facility] Discharge diagnosis: Neurosarcoidosis.     SNF Attending Provider: Jensen Allen  Anticipated LOS: 60 days  Anticipated discharge date from SNF:  8/25/2017  Anticipated disop: home with family support     PCP : Britt Elise MD     Nurse Navigator: [address] [place] [address].      Diet: Mech soft, necter thick     CCT rounds completed, updates provided by facility.      regular diet with thicken liquids. Still  having trouble with swallowing. Progressing with therapy. Mother hesitant to take him home. Dispo unknown. Medicaid in place.  Switched to LTC. 8/14

## 2017-09-06 NOTE — PROGRESS NOTES
[address] [facility] and [place] ([phone]) and patient's identity verified x2.  Patient transitioned and currently residing there for long term care      Will remove Dr. Shara Alicia as PCP

## 2017-11-17 NOTE — TELEPHONE ENCOUNTER
Called placed to number on file to confirm upcoming appointment. Patients Mother advised me that she is cancelling all appointments because  he is currently in long term rehab. Ms Kvng Fisehr states was told patient no longer needs to see PCP, as he is being treated by a physician there. She went to explain insurance will not cover both.

## 2018-03-23 ENCOUNTER — TELEPHONE (OUTPATIENT)
Dept: FAMILY MEDICINE CLINIC | Age: 38
End: 2018-03-23

## 2018-03-23 NOTE — TELEPHONE ENCOUNTER
Pt's mother came into the office to let Dr. Elsi Jimenez know that patient has  17. It is updated in [place].

## 2020-07-02 NOTE — ROUTINE PROCESS
Care plan initiated     Problem: Ineffective Coping  Goal: Participates in unit activities  Description  Interventions:  - Provide therapeutic environment   - Provide required programming   - Redirect inappropriate behaviors   Outcome: Not Progressing     Problem: SAFETY, RESTRAINT - VIOLENT/SELF-DESTRUCTIVE  Goal: Remains free of harm/injury from restraints (Restraint for Violent/Self-Destructive Behavior)  Description  INTERVENTIONS:  - Instruct patient/family regarding restraint use   - Assess and monitor physiologic and psychological status   - Provide interventions and comfort measures to meet assessed patient needs   - Ensure continuous in person monitoring is provided   - Identify and implement measures to help patient regain control  - Assess readiness for release of restraint  Outcome: Not Progressing  Goal: Returns to optimal restraint-free functioning  Description  INTERVENTIONS:  - Assess the patient's behavior and symptoms that indicate continued need for restraint  - Identify and implement measures to help patient regain control  - Assess readiness for release of restraint   Outcome: Not Progressing     Problem: PSYCHOSIS  Goal: Will report no hallucinations or delusions  Description  Interventions:  - Administer medication as  ordered  - Every waking shifts and PRN assess for the presence of hallucinations and or delusions  - Assist with reality testing to support increasing orientation  - Assess if patient's hallucinations or delusions are encouraging self-harm or harm to others and intervene as appropriate  Outcome: Not Progressing     Problem: BEHAVIOR  Goal: Pt/Family maintain appropriate behavior and adhere to behavioral management agreement, if implemented  Description  INTERVENTIONS:  - Assess the family dynamic   - Encourage verbalization of thoughts and concerns in a socially appropriate manner  - Assess patient/family's coping skills and non-compliant behavior (including use of illegal Spoke with SHEFALI Bruner and PICC not needed at this time. substances)  - Utilize positive, consistent limit setting strategies supporting safety of patient, staff and others  - Initiate consult with Case Management, Spiritual Care or other ancillary services as appropriate  - If a patient's/visitor's behavior jeopardizes the safety of the patient, staff, or others, refer to organization procedure     - Notify Security of behavior or suspected illegal substances which indicate the need for search of the patient and/or belongings  - Encourage participation in the decision making process about a behavioral management agreement; implement if patient meets criteria  Outcome: Not Progressing     Problem: INVOLUNTARY ADMIT  Goal: Will cooperate with staff recommendations and doctor's orders and will demonstrate appropriate behavior  Description  INTERVENTIONS:  - Treat underlying conditions and offer medication as ordered  - Educate regarding involuntary admission procedures and rules  - Utilize positive consistent limit setting strategies to support patient and staff safety  Outcome: Not Progressing

## 2020-12-21 NOTE — DISCHARGE SUMMARY
Discharge Summary    Patient: Chastity Vuong. Sex: male          DOA: 3/2/2017         YOB: 1980      Age:  39 y.o.        LOS:  LOS: 7 days                Admit Date: 3/2/2017    Discharge Date: 3/9/2017    Admission Diagnoses: Meningitis  Meningitis    Discharge Diagnoses:   1-Neurosarcoidosis   2-Possible bacterial meningitis  3-Red man syndrome  4-Fever  5-Diplopia  6-Cataract  7-Urinary urgency    Problem List as of 3/9/2017  Date Reviewed: 3/2/2017          Codes Class Noted - Resolved    Headache ICD-10-CM: R51  ICD-9-CM: 784.0  3/3/2017 - Present        * (Principal)Meningitis ICD-10-CM: G03.9  ICD-9-CM: 322.9  3/2/2017 - Present        Advance care planning ICD-10-CM: Z71.89  ICD-9-CM: V65.49  11/17/2016 - Present    Overview Signed 11/17/2016  3:20 PM by Roxanne Mooney MD     Pt names his mother as his power of ; he is full code             Neurosarcoidosis [facility]) ICD-10-CM: D86.89  ICD-9-CM: 135  10/17/2016 - Present        Left hemiparesis ([facility] [address] ICD-10-CM: G81.94  ICD-9-CM: 342.90  10/17/2016 - Present              Discharge Medications:  Ceftriaxone 2 gm iv q 12 hrs x 8 days  Flomax 0.4 mg po daily   Decadron 4 mg po q 8 hrs x 1 week. Then 4 mg po q 12 hrs for 1 week. Then start tapering prednisone starting with 40 mg daily. Imuran 50 mg po daily,then increase dose as per neurologist  Current Discharge Medication List      CONTINUE these medications which have NOT CHANGED    Details      acetaminophen-codeine (TYLENOL #3) 300-30 mg per tablet Take 1 Tab by mouth every four (4) hours as needed for Pain. Max Daily Amount: 6 Tabs. Qty: 20 Tab, Refills: 0    Associated Diagnoses: Intractable headache, unspecified chronicity pattern, unspecified headache type; Acute left ankle pain         buPROPion XL (WELLBUTRIN XL) 150 mg tablet Take 300 mg by mouth every morning.          STOP taking these medications       cefTRIAXone (ROCEPHIN) 1 gram injection Comments:   Reason for Stopping:         ketorolac tromethamine (TORADOL) 60 mg/2 mL soln Comments:   Reason for Stopping: Follow-up:pcp and neurologist                     infectious disease. Discharge Condition:good    Activity:as tolerated    Diet:regular    Labs:  Labs: Results:       Chemistry Recent Labs      03/08/17 0448 03/07/17   0700   GLU  91  99   NA  141  143   K  3.5  3.8   CL  106  109*   CO2  26  27   BUN  9  8   CREA  0.65  0.72   CA  7.9*  7.6*   AGAP  9  7   BUCR  14  11*   AP  66  66   TP  5.3*  5.3*   ALB  2.4*  2.3*   GLOB  2.9  3.0   AGRAT  0.8  0.8      CBC w/Diff Recent Labs      03/08/17 0448 03/07/17   0700   WBC  9.9  10.0   RBC  4.14*  4.14*   HGB  11.1*  10.9*   HCT  34.6*  34.0*   PLT  261  239   GRANS  84*  85*   LYMPH  9*  11*   EOS  0  0      Cardiac Enzymes No results for input(s): CPK, CKND1, BRAYDON in the last 72 hours. No lab exists for component: CKRMB, TROIP   Coagulation No results for input(s): PTP, INR, APTT in the last 72 hours. No lab exists for component: INREXT, INREXT    Lipid Panel Lab Results   Component Value Date/Time    Cholesterol, total 146 11/17/2016 03:33 PM    HDL Cholesterol 39 11/17/2016 03:33 PM    LDL, calculated 81.4 11/17/2016 03:33 PM    VLDL, calculated 25.6 11/17/2016 03:33 PM    Triglyceride 128 11/17/2016 03:33 PM    CHOL/HDL Ratio 3.7 11/17/2016 03:33 PM      BNP No results for input(s): BNPP in the last 72 hours. Liver Enzymes Recent Labs      03/08/17 0448   TP  5.3*   ALB  2.4*   AP  66   SGOT  11*      Thyroid Studies Lab Results   Component Value Date/Time    TSH 0.28 03/02/2017 04:04 PM          Imaging:  CT scan of head on 3/1/2017:  1. No prior exams. Several small poorly defined foci of hypodensity, central  gray asymmetrically bilaterally, primarily in right thalamus and adjacent  posterior limb internal capsule, may represent lacunar infarcts, age  indeterminate. Recommend contrast enhanced MRI head for clarification. No  evident acute hemorrhage or focal mass effect. MRI of brain on 3/2/2017:  1. Abnormal edematous signal changes with associated mild expansion involving  left thalamus and left midbrain with ventral peripheral enhancement, without  restricted diffusion signal. Given history of prior neurosarcoidosis, this  certainly could represent parenchymal neurosarcoidosis. Differential diagnosis  includes tumefactive demyelination, with no other evidence of multiple  sclerosis. Less likely possibility includes neoplasm. Further evaluation  recommended with CSF analysis and follow-up MR imaging following treatment. Initial report was given by the radiologist on call at 8:00 PM 3/2/2017.     2. Associated narrowing of the third ventricle and upper aqueduct due to left  thalamic and midbrain process, without hydrocephalus.     3. Chronic right thalamic lacunar infarct and nonspecific upper pontine signal  changes, possible sequela of previous chronic microvascular disease or  demyelination. Chronic findings within right thalamus could also represent  sequela of remote involvement of previous neurosarcoidosis. MRA of brain on 3/6/2017:  Unremarkable brain MRA, no significant intracranial stenosis.       MRI of brain on 3/7/2017:  1. Persistent signal abnormality left thalamus and midbrain slightly improved  along the periphery with slight improvement in degree of midbrain expansion,  with resolution of previous enhancement. Differential diagnosis continues to  include site of involvement of neurosarcoidosis or demyelinating process. Infectious etiology of cerebritis not entirely excluded. Given improvement in  previous enhancement, neoplasm less likely.     2. Stable areas of chronic signal changes right thalamus and tom which may  represent sequela of previous chronic neurosarcoidosis or areas of chronic  infarct or demyelination.     3.  No evidence of new acute intracranial finding or other significant change. Consults:   Neurology  ID    Treatment Team: Treatment Team: Attending Provider: Saloni Gonzáles MD; Consulting Provider: Milton Stockton MD; Consulting Provider: Vikki Flores MD; Consulting Provider: Dinesh Daniels MD; Utilization Review: Marino Villa RN; Charge Nurse: Jocelyn Suarez RN; Occupational Therapy Assistant: ANKIT Wills; Physical Therapy Assistant: Salvatore Zapata PTA    Significant Diagnostic Studies:See recent lab result. Hospital Course:  39 y.o. male with pmhx of neurosarcoidosis, depression, left hemiparesis, meningitis, stroke who presents with headache and left eye diplopia onset 3 days. Very poor historian with mild dysarthria. Baseline mentation unknown. Very poor medical insight. Apparently he had a similar presentation back in [facility] in 2011, and 2014. Neurosacroid was diagnosed and patient was on immunosuppression with cellcept but took him self off this over 1 yr ago. In ER LP suggestive of infectious process bacterial vs viral . LP and CSF analysis with CSF protein 103, CSF , CSF glucose 46, CSF RBC 8. CSF culture and gram stain and Blood culture negative. Patient was started on Rocephin IV, Vancomycin IV and Acyclovir. ID and Neurology consulted. Acyclovir stopped 3/4 with negative HSV on CSF. Further CSF studies have been negative for infection supporing neurosarcoidosis as the primary etiology. Vanc was d/kel. Patient was kept on rocephin as neurologist was considering superimposed bacterial infection meningitis (elevated WBC in CSF and h/o receiving atbx PTA) on top of neurosarcoidosis. ID has indicated that patient will need atbx for 2 weeks. On 3/6/2017,opthalmologist saw patient because of double vision and blurred vision. There were no evidence of posterior uveitis from the sarcoidosis. Ophthalmologist concluded that the diplopia should resolve as the sarcoid granulomas resolve. She also indicated that the blurred vision OS was from cataract,most likely from the steroid. MRI of brain repeated on 3/7/2017,showed improvement compared to previous(see MRI results above). Neurologist will resume imuran as agreed by ID. Patient will be on decadron,dose determined by neurologist.Then later will go on prednisone after seen by his neurologist.Patient has shown improvement and no more headaches. He is clinically stable for discharge. He will be discharged to [facility] for rehab. P/E  NAD. Heent:blurred vision  Lungs ctab  Heart s1s2 nl  Abdm:soft,not tender,bs present  Extr:no edema. Neuro:aaox3,blurred vision. Time for discharge:50 minutes  Case discussed with neurology.     Matthew Carvalho MD  March 9, 2017 Patient has no objection to blood transfusions.

## 2021-10-13 NOTE — ANCILLARY DISCHARGE INSTRUCTIONS
Patient and/or next of kin has been given the [facility] Important Message From Medicare About Your Rights\" letter and all questions were answered.
moderate assist (50% patients effort)

## 2024-02-16 NOTE — TELEPHONE ENCOUNTER
Order entered.
Patient mother was calling request to have additional Physical therapy orders. Patients mother does not know what to do.
Please see message below. Please advise.  Thank you
Comment: No improvement with Skin[employer] after 3 months of use s \\nRecommend OTC Brightening Serums ([facility]), SkinCeuticals \\nRecommend Chemical Peel with Dr. Singer
Render Risk Assessment In Note?: no
Detail Level: Simple

## (undated) DEVICE — SYRINGE MED 3ML NDL 22GA L1 1/2IN REG BVL SFGLDE

## (undated) DEVICE — Device

## (undated) DEVICE — SOLUTION IRRIG 1000ML H2O STRL BLT

## (undated) DEVICE — SUTURE VCRL SZ 2-0 L18IN ABSRB UD CT-1 L36MM 1/2 CIR J839D

## (undated) DEVICE — SKIN MARKER,REGULAR TIP WITH RULER AND LABELS: Brand: DEVON

## (undated) DEVICE — INTENDED FOR TISSUE SEPARATION, AND OTHER PROCEDURES THAT REQUIRE A SHARP SURGICAL BLADE TO PUNCTURE OR CUT.: Brand: BARD-PARKER ® CARBON RIB-BACK BLADES

## (undated) DEVICE — SKIN CLOS DERMABND PRINEO 60CM -- DERMABOUND PRINEO

## (undated) DEVICE — TRAJ GUIDE KIT, 9733065, BIOPSY, EXT

## (undated) DEVICE — PAD,NON-ADHERENT,3X8,STERILE,LF,1/PK: Brand: MEDLINE

## (undated) DEVICE — FLEX ADVANTAGE 1500CC: Brand: FLEX ADVANTAGE

## (undated) DEVICE — SUTURE NRLN SZ 4-0 L18IN NONABSORBABLE BLK L13MM TF 1/2 CIR C584D

## (undated) DEVICE — BIOPSY NEEDLE KIT 9733068 PASSIVE

## (undated) DEVICE — KENDALL SCD EXPRESS SLEEVES, KNEE LENGTH, MEDIUM: Brand: KENDALL SCD

## (undated) DEVICE — TIBURON SPLIT SHEET: Brand: CONVERTORS

## (undated) DEVICE — CODMAN® SURGICAL PATTIES 1/2" X 1/2" (1.27CM X 1.27CM): Brand: CODMAN®

## (undated) DEVICE — THYROID SHEET: Brand: CONVERTORS

## (undated) DEVICE — WAX SURG 2.5GM HEMSTAT BNE BEESWAX PARAFFIN ISO PALMITATE

## (undated) DEVICE — TOWEL: OR BLU 80/CS: Brand: MEDICAL ACTION INDUSTRIES

## (undated) DEVICE — AGENT HEMSTAT 8ML FLX TIP MTRX + DISP SURGIFLO

## (undated) DEVICE — INTENDED FOR TISSUE SEPARATION, AND OTHER PROCEDURES THAT REQUIRE A SHARP SURGICAL BLADE TO PUNCTURE OR CUT.: Brand: BARD-PARKER ® DISPOSABLE SCALPELS

## (undated) DEVICE — MAYFIELD® DISPOSABLE ADULT SKULL PIN (PLASTIC BASE): Brand: MAYFIELD®

## (undated) DEVICE — SPHERE STEALTH 12PK/TY --

## (undated) DEVICE — 10FR FRAZIER SUCTION HANDLE: Brand: CARDINAL HEALTH

## (undated) DEVICE — SPINE PACK DEPAUL: Brand: MEDLINE INDUSTRIES, INC.

## (undated) DEVICE — SOLUTION IV 1000ML 0.9% SOD CHL

## (undated) DEVICE — CATHETER DRNGE 30FR 4 WNG DISP FOR NEPHSTMY MALECOTS

## (undated) DEVICE — FLOSEAL MATRIX IS INDICATED IN SURGICAL PROCEDURES (OTHER THAN IN OPHTHALMIC) AS AN ADJUNCT TO HEMOSTASIS WHEN CONTROL OF BLEEDING BY LIGATURE OR CONVENTIONALPROCEDURES IS INEFFECTIVE OR IMPRACTICAL.: Brand: FLOSEAL HEMOSTATIC MATRIX

## (undated) DEVICE — SURGIFOAM SPNG SZ 100

## (undated) DEVICE — PREP SKN DURAPREP 26ML APPL --

## (undated) DEVICE — NEEDLE HYPO 22GA L1.5IN BLK S STL HUB POLYPR SHLD REG BVL

## (undated) DEVICE — (D)SYR 10ML SLIP TIP 1/5ML GRD -- DISC BY MFR USE ITEM 338000

## (undated) DEVICE — SYR 10ML CTRL LR LCK NSAF LF --

## (undated) DEVICE — NDL SPNE LR LCK 18GX6IN PNK --

## (undated) DEVICE — POWDER HEMOSTAT GEL 1GR --

## (undated) DEVICE — CORD BPLR 2 PIN FLAT AND RND DISP